# Patient Record
Sex: FEMALE | Race: WHITE | NOT HISPANIC OR LATINO | Employment: FULL TIME | URBAN - METROPOLITAN AREA
[De-identification: names, ages, dates, MRNs, and addresses within clinical notes are randomized per-mention and may not be internally consistent; named-entity substitution may affect disease eponyms.]

---

## 2017-08-17 ENCOUNTER — OFFICE VISIT (OUTPATIENT)
Dept: PEDIATRICS | Facility: CLINIC | Age: 52
End: 2017-08-17
Payer: COMMERCIAL

## 2017-08-17 ENCOUNTER — RADIANT APPOINTMENT (OUTPATIENT)
Dept: MAMMOGRAPHY | Facility: CLINIC | Age: 52
End: 2017-08-17
Payer: COMMERCIAL

## 2017-08-17 VITALS
TEMPERATURE: 97.5 F | BODY MASS INDEX: 38.73 KG/M2 | HEIGHT: 63 IN | DIASTOLIC BLOOD PRESSURE: 66 MMHG | OXYGEN SATURATION: 97 % | HEART RATE: 88 BPM | WEIGHT: 218.6 LBS | SYSTOLIC BLOOD PRESSURE: 108 MMHG

## 2017-08-17 DIAGNOSIS — R63.5 ABNORMAL WEIGHT GAIN: ICD-10-CM

## 2017-08-17 DIAGNOSIS — Z00.00 ROUTINE HEALTH MAINTENANCE: Primary | ICD-10-CM

## 2017-08-17 DIAGNOSIS — Z00.00 ROUTINE HEALTH MAINTENANCE: ICD-10-CM

## 2017-08-17 DIAGNOSIS — M79.10 MUSCLE ACHE: ICD-10-CM

## 2017-08-17 DIAGNOSIS — M76.60 ACHILLES TENDON PAIN: ICD-10-CM

## 2017-08-17 DIAGNOSIS — D23.5 BENIGN NEOPLASM OF SKIN OF TRUNK, EXCEPT SCROTUM: ICD-10-CM

## 2017-08-17 LAB
CHOLEST SERPL-MCNC: 167 MG/DL
HDLC SERPL-MCNC: 50 MG/DL
LDLC SERPL CALC-MCNC: 86 MG/DL
NONHDLC SERPL-MCNC: 117 MG/DL
TRIGL SERPL-MCNC: 156 MG/DL
TSH SERPL DL<=0.005 MIU/L-ACNC: 1.49 MU/L (ref 0.4–4)

## 2017-08-17 PROCEDURE — G0202 SCR MAMMO BI INCL CAD: HCPCS | Mod: TC

## 2017-08-17 PROCEDURE — 99212 OFFICE O/P EST SF 10 MIN: CPT | Mod: 25 | Performed by: NURSE PRACTITIONER

## 2017-08-17 PROCEDURE — 99396 PREV VISIT EST AGE 40-64: CPT | Performed by: NURSE PRACTITIONER

## 2017-08-17 PROCEDURE — 86803 HEPATITIS C AB TEST: CPT | Performed by: NURSE PRACTITIONER

## 2017-08-17 PROCEDURE — 84443 ASSAY THYROID STIM HORMONE: CPT | Performed by: NURSE PRACTITIONER

## 2017-08-17 PROCEDURE — 80061 LIPID PANEL: CPT | Performed by: NURSE PRACTITIONER

## 2017-08-17 PROCEDURE — 82306 VITAMIN D 25 HYDROXY: CPT | Performed by: NURSE PRACTITIONER

## 2017-08-17 PROCEDURE — 36415 COLL VENOUS BLD VENIPUNCTURE: CPT | Performed by: NURSE PRACTITIONER

## 2017-08-17 NOTE — PROGRESS NOTES
SUBJECTIVE:   CC: Palak Eckert is an 52 year old woman who presents for preventive health visit.     Physical   Annual:     Getting at least 3 servings of Calcium per day::  Yes    Bi-annual eye exam::  Yes    Dental care twice a year::  Yes    Sleep apnea or symptoms of sleep apnea::  None    Diet::  Regular (no restrictions)    Frequency of exercise::  2-3 days/week    Duration of exercise::  45-60 minutes    Taking medications regularly::  Not Applicable    Medication side effects::  None    Additional concerns today::  YES  Concerns today: toenails on both great toes  Left ankle achilles - bump. Has a bump on the back of her left achilles. Not painful. Was painful a few months ago for a few days but not painful. No weakness. Bump actually getting smaller.     Reports muscle aches over the last year. Has gained 10 lbs. Feels more sore when lying in bed or sometimes in the morning. No joint pain.     Today's PHQ-2 Score:   PHQ-2 ( 1999 Pfizer) 8/17/2017   Q1: Little interest or pleasure in doing things 0   Q2: Feeling down, depressed or hopeless 0   PHQ-2 Score 0   Q1: Little interest or pleasure in doing things Not at all   Q2: Feeling down, depressed or hopeless Not at all   PHQ-2 Score 0       Abuse: Current or Past(Physical, Sexual or Emotional)- No  Do you feel safe in your environment - Yes    Social History   Substance Use Topics     Smoking status: Never Smoker     Smokeless tobacco: Never Used     Alcohol use No      Comment: maybe 1 time per year     The patient does not drink >3 drinks per day nor >7 drinks per week.    Reviewed orders with patient.  Reviewed health maintenance and updated orders accordingly - Yes  Labs reviewed in [facility]    Patient over age 50, mutual decision to screen reflected in health maintenance.      Pertinent mammograms are reviewed under the imaging tab.  History of abnormal Pap smear: NO - age 30-65 PAP every 5 years with negative HPV co-testing recommended    Reviewed  "and updated as needed this visit by clinical staffTobacco  Allergies  Meds  Med Hx  Surg Hx  Fam Hx  Soc Hx        Reviewed and updated as needed this visit by Provider              ROS:  C: NEGATIVE for fever, chills, change in weight  I: NEGATIVE for worrisome rashes, moles or lesions  E: NEGATIVE for vision changes or irritation  ENT: NEGATIVE for ear, mouth and throat problems  R: NEGATIVE for significant cough or SOB  B: NEGATIVE for masses, tenderness or discharge  CV: NEGATIVE for chest pain, palpitations or peripheral edema  GI: NEGATIVE for nausea, abdominal pain, heartburn, or change in bowel habits  : NEGATIVE for unusual urinary or vaginal symptoms. No vaginal bleeding.  M: NEGATIVE for significant arthralgias or myalgia  N: NEGATIVE for weakness, dizziness or paresthesias  P: NEGATIVE for changes in mood or affect      OBJECTIVE:   /66 (Cuff Size: Adult Large)  Pulse 88  Temp 97.5  F (36.4  C) (Tympanic)  Ht 5' 2.75\" (1.594 m)  Wt 218 lb 9.6 oz (99.2 kg)  SpO2 97%  BMI 39.03 kg/m2  EXAM:  GENERAL: healthy, alert and no distress  EYES: Eyes grossly normal to inspection, PERRL and conjunctivae and sclerae normal  HENT: ear canals and TM's normal, nose and mouth without ulcers or lesions  NECK: no adenopathy, no asymmetry, masses, or scars and thyroid normal to palpation  RESP: lungs clear to auscultation - no rales, rhonchi or wheezes  BREAST: normal without masses, tenderness or nipple discharge and no palpable axillary masses or adenopathy  CV: regular rate and rhythm, normal S1 S2, no S3 or S4, no murmur, click or rub, no peripheral edema and peripheral pulses strong  ABDOMEN: soft, nontender, no hepatosplenomegaly, no masses and bowel sounds normal  MS: no gross musculoskeletal defects noted, no edema  SKIN: no suspicious lesions or rashes  NEURO: Normal strength and tone, mentation intact and speech normal  PSYCH: mentation appears normal, affect normal/bright    ASSESSMENT/PLAN: " "  1. Routine health maintenance  - *MA Screening Digital Bilateral; Future  - Hepatitis C Screen Reflex to HCV RNA Quant and Genotype  - TSH with free T4 reflex  - Lipid panel reflex to direct LDL    2. Achilles tendon pain  Likely achilles tendinopathy with visible swelling over the achilles. However, it is not painful and patient states the mass has gotten smaller.   -Wear supportive shoes  -If mass gets larger or if starts becoming painful call for podiatry referral  -Warm up and do gentle calf stretching in the morning and before exercise.     3. Benign neoplasm of skin of trunk, except scrotum  Has fair skin, needs skin check.   - DERMATOLOGY REFERRAL    4. Muscle ache  Has mild muscle aches. Likely due to weight gain and aging. No severe aches to warrant checking CK. No joint pain.    - Vitamin D Deficiency      (R63.5) Abnormal weight gain  Comment: States she eats minimally. 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   Plan:   -I recommended that she count her calories for about a month. If trying to lose weight needs to try to reduce the amount of calories she is eating in a day by about 500 and increase her physical activity.  -TSH, glucose, lipids  -If not having success losing 10lbs over the next 3-4 months she can call and I will put in a referral to endo for weight loss.        COUNSELING:  Reviewed preventive health counseling, as reflected in patient instructions       reports that she has never smoked. She has never used smokeless tobacco.    Estimated body mass index is 39.03 kg/(m^2) as calculated from the following:    Height as of this encounter: 5' 2.75\" (1.594 m).    Weight as of this encounter: 218 lb 9.6 oz (99.2 kg).   Weight management plan: Discussed healthy diet and exercise guidelines and patient will follow up in 12 months in clinic to re-evaluate.    Counseling Resources:  ATP IV Guidelines  Pooled Cohorts Equation Calculator  Breast Cancer Risk " Calculator  FRAX Risk Assessment  ICSI Preventive Guidelines  Dietary Guidelines for Americans, 2010  USDA's MyPlate  ASA Prophylaxis  Lung CA Screening    Ml Paz, APRN CNP  [facility]

## 2017-08-17 NOTE — PATIENT INSTRUCTIONS
Tea tree oil twice a day   FHN: [facility] Dermat[facility] - [place] [phone]   [address], [place] [address]      Preventive Health Recommendations  Female Ages 50 - 64    Yearly exam: See your health care provider every year in order to  o Review health changes.   o Discuss preventive care.    o Review your medicines if your doctor has prescribed any.      Get a Pap test every three years (unless you have an abnormal result and your provider advises testing more often).    If you get Pap tests with HPV test, you only need to test every 5 years, unless you have an abnormal result.     You do not need a Pap test if your uterus was removed (hysterectomy) and you have not had cancer.    You should be tested each year for STDs (sexually transmitted diseases) if you're at risk.     Have a mammogram every 1 to 2 years.    Have a colonoscopy at age 50, or have a yearly FIT test (stool test). These exams screen for colon cancer.      Have a cholesterol test every 5 years, or more often if advised.    Have a diabetes test (fasting glucose) every three years. If you are at risk for diabetes, you should have this test more often.     If you are at risk for osteoporosis (brittle bone disease), think about having a bone density scan (DEXA).    Shots: Get a flu shot each year. Get a tetanus shot every 10 years.    Nutrition:     Eat at least 5 servings of fruits and vegetables each day.    Eat whole-grain bread, whole-wheat pasta and brown rice instead of white grains and rice.    Talk to your provider about Calcium and Vitamin D.     Lifestyle    Exercise at least 150 minutes a week (30 minutes a day, 5 days a week). This will help you control your weight and prevent disease.    Limit alcohol to one drink per day.    No smoking.     Wear sunscreen to prevent skin cancer.     See your dentist every six months for an exam and cleaning.    See your eye doctor every 1 to 2 years.

## 2017-08-17 NOTE — MR AVS SNAPSHOT
After Visit Summary   8/17/2017    Palak Eckert    MRN: [id number]           Patient Information     Date Of Birth          1965        Visit Information        Provider Department      8/17/2017 8:40 AM Ml Paz APRN [facility]        Today's Diagnoses     Routine health maintenance    -  1    Achilles tendon pain        Benign neoplasm of skin of trunk, except scrotum          Care Instructions    Tea tree oil twice a day   FHN: My Dermatologist - [place] [phone] [address], [place] [address]      Preventive Health Recommendations  Female Ages 50 - 64    Yearly exam: See your health care provider every year in order to  o Review health changes.   o Discuss preventive care.    o Review your medicines if your doctor has prescribed any.      Get a Pap test every three years (unless you have an abnormal result and your provider advises testing more often).    If you get Pap tests with HPV test, you only need to test every 5 years, unless you have an abnormal result.     You do not need a Pap test if your uterus was removed (hysterectomy) and you have not had cancer.    You should be tested each year for STDs (sexually transmitted diseases) if you're at risk.     Have a mammogram every 1 to 2 years.    Have a colonoscopy at age 50, or have a yearly FIT test (stool test). These exams screen for colon cancer.      Have a cholesterol test every 5 years, or more often if advised.    Have a diabetes test (fasting glucose) every three years. If you are at risk for diabetes, you should have this test more often.     If you are at risk for osteoporosis (brittle bone disease), think about having a bone density scan (DEXA).    Shots: Get a flu shot each year. Get a tetanus shot every 10 years.    Nutrition:     Eat at least 5 servings of fruits and vegetables each day.    Eat whole-grain bread, whole-wheat pasta and brown rice  instead of white grains and rice.    Talk to your provider about Calcium and Vitamin D.     Lifestyle    Exercise at least 150 minutes a week (30 minutes a day, 5 days a week). This will help you control your weight and prevent disease.    Limit alcohol to one drink per day.    No smoking.     Wear sunscreen to prevent skin cancer.     See your dentist every six months for an exam and cleaning.    See your eye doctor every 1 to 2 years.            Follow-ups after your visit        Additional Services     DERMATOLOGY REFERRAL       Your provider has referred you to: LINDA: [facility] Dermatologist - [place] [phone]   [email or web address]    Please be aware that coverage of these services is subject to the terms and limitations of your health insurance plan.  Call member services at your health plan with any benefit or coverage questions.      Please bring the following with you to your appointment:    (1) Any X-Rays, CTs or MRIs which have been performed.  Contact the facility where they were done to arrange for  prior to your scheduled appointment.    (2) List of current medications  (3) This referral request   (4) Any documents/labs given to you for this referral                  Your next 10 appointments already scheduled     Aug 17, 2017  9:45 AM CDT   MA SCREENING DIGITAL BILATERAL with EAMA1   [facility] ([facility])    [address] ,[address]  [facility] [address] [phone]           Do not use any powder, lotion or deodorant under your arms or on your breast. If you do, we will ask you to remove it before your exam.  Wear comfortable, two-piece clothing.  If you have any allergies, tell your care team.  Bring any previous mammograms from other facilities or have them mailed to the breast center. Three-dimensional (3D) mammograms are available at [place] locations in [facility], [facility], and [place]. [facility]  "locations include [facility] in [place]. Benefits of 3D mammograms include: - Improved rate of cancer detection - Decreases your chance of having to go back for more tests, which means fewer: - \"False-positive\" results (This means that there is an abnormal area but it isn't cancer.) - Invasive testing procedures, such as a biopsy or surgery - Can provide clearer images of the breast if you have dense breast tissue. 3D mammography is an optional exam that anyone can have with a 2D mammogram. It doesn't replace or take the place of a 2D mammogram. 2D mammograms remain an effective screening test for all women.  Not all insurance companies cover the cost of a 3D mammogram. Check with your insurance.              Future tests that were ordered for you today     Open Future Orders        Priority Expected Expires Ordered    *MA Screening Digital Bilateral Routine  8/17/2018 8/17/2017            Who to contact     If you have questions or need follow up information about today's clinic visit or your schedule please contact [facility] directly at [phone].  Normal or non-critical lab and imaging results will be communicated to you by MyChart, letter or phone within 4 business days after the clinic has received the results. If you do not hear from us within 7 days, please contact the clinic through [employer] or phone. If you have a critical or abnormal lab result, we will notify you by phone as soon as possible.  Submit refill requests through [employer] or call your pharmacy and they will forward the refill request to us. Please allow 3 business days for your refill to be completed.          Additional Information About Your Visit        [employer] Information     [employer] lets you send messages to your doctor, view your test results, renew your prescriptions, schedule appointments and more. To sign up, go to [email or web address] . Click on \"Log in\" on the left side of the " "screen, which will take you to the Welcome page. Then click on \"Sign up Now\" on the right side of the page.     You will be asked to enter the access code listed below, as well as some personal information. Please follow the directions to create your username and password.     Your access code is: [email or web address]  Expires: 11/15/2017  9:11 AM     Your access code will  in 90 days. If you need help or a new code, please call your [facility] or [phone].        Care EveryWhere ID     This is your Care EveryWhere ID. This could be used by other organizations to access your [place] medical records  [id number]        Your Vitals Were     Pulse Temperature Height Pulse Oximetry BMI (Body Mass Index)       88 97.5  F (36.4  C) (Tympanic) 5' 2.75\" (1.594 m) 97% 39.03 kg/m2        Blood Pressure from Last 3 Encounters:   17 108/66   16 114/68   12/18/15 116/79    Weight from Last 3 Encounters:   17 218 lb 9.6 oz (99.2 kg)   16 208 lb (94.3 kg)   12/18/15 205 lb 3.2 oz (93.1 kg)              We Performed the Following     DERMATOLOGY REFERRAL     Hepatitis C Screen Reflex to HCV RNA Quant and Genotype     Lipid panel reflex to direct LDL     TSH with free T4 reflex        Primary Care Provider Office Phone # Fax #    Ml Paz, JULIA [facility] [phone] [address] [place] [address]        Equal Access to Services     [facility] SAVANNAH : Hadii seferino andrade Soesteban, waaxda luqadaha, qaybta kaalmada mikala, brayan dodd. So [facility] [phone].    ATENCIÓN: Si habla español, tiene a carlisle disposición servicios gratuitos de asistencia lingüística. Llame al [phone].    We comply with applicable federal civil rights laws and [place] laws. We do not discriminate on the basis of race, color, national origin, age, disability sex, sexual orientation or gender identity.            Thank you!     Thank you for choosing " [facility]  for your care. Our goal is always to provide you with excellent care. Hearing back from our patients is one way we can continue to improve our services. Please take a few minutes to complete the written survey that you may receive in the mail after your visit with us. Thank you!             Your Updated Medication List - Protect others around you: Learn how to safely use, store and throw away your medicines at www.disposemymeds.org.          This list is accurate as of: 8/17/17  9:17 AM.  Always use your most recent med list.                   Brand Name Dispense Instructions for use Diagnosis    fluticasone 50 MCG/ACT spray    FLONASE    1 Bottle    Spray 1-2 sprays into both nostrils daily    Seasonal allergic rhinitis       Multi-vitamin Tabs tablet      Take 1 tablet by mouth daily

## 2017-08-17 NOTE — NURSING NOTE
"Chief Complaint   Patient presents with     Physical       Initial /66 (Cuff Size: Adult Large)  Pulse 88  Temp 97.5  F (36.4  C) (Tympanic)  Ht 5' 2.75\" (1.594 m)  Wt 218 lb 9.6 oz (99.2 kg)  SpO2 97%  BMI 39.03 kg/m2 Estimated body mass index is 39.03 kg/(m^2) as calculated from the following:    Height as of this encounter: 5' 2.75\" (1.594 m).    Weight as of this encounter: 218 lb 9.6 oz (99.2 kg).  Medication Reconciliation: complete   Naz Ruiz CMA    "

## 2017-08-18 LAB
DEPRECATED CALCIDIOL+CALCIFEROL SERPL-MC: 31 UG/L (ref 20–75)
HCV AB SERPL QL IA: NONREACTIVE

## 2017-11-24 ENCOUNTER — ALLIED HEALTH/NURSE VISIT (OUTPATIENT)
Dept: NURSING | Facility: CLINIC | Age: 52
End: 2017-11-24
Payer: COMMERCIAL

## 2017-11-24 DIAGNOSIS — Z23 NEED FOR PROPHYLACTIC VACCINATION AND INOCULATION AGAINST INFLUENZA: Primary | ICD-10-CM

## 2017-11-24 PROCEDURE — 90686 IIV4 VACC NO PRSV 0.5 ML IM: CPT

## 2017-11-24 PROCEDURE — 90471 IMMUNIZATION ADMIN: CPT

## 2017-11-24 PROCEDURE — 99207 ZZC NO CHARGE NURSE ONLY: CPT

## 2017-11-24 NOTE — MR AVS SNAPSHOT
"              After Visit Summary   2017    Palak Eckert    MRN: [id number]           Patient Information     Date Of Birth          1965        Visit Information        Provider Department      2017 2:15 PM ANGELA ROMAN MA/DHRUV [facility]        Today's Diagnoses     Need for prophylactic vaccination and inoculation against influenza    -  1       Follow-ups after your visit        Who to contact     If you have questions or need follow up information about today's clinic visit or your schedule please contact [facility] directly at [phone].  Normal or non-critical lab and imaging results will be communicated to you by "[employer]hart, letter or phone within 4 business days after the clinic has received the results. If you do not hear from us within 7 days, please contact the clinic through [employer] or phone. If you have a critical or abnormal lab result, we will notify you by phone as soon as possible.  Submit refill requests through [employer] or call your pharmacy and they will forward the refill request to us. Please allow 3 business days for your refill to be completed.          Additional Information About Your Visit        MyChart Information     [employer]d lets you send messages to your doctor, view your test results, renew your prescriptions, schedule appointments and more. To sign up, go to [email or web address] . Click on \"Log in\" on the left side of the screen, which will take you to the Welcome page. Then click on \"Sign up Now\" on the right side of the page.     You will be asked to enter the access code listed below, as well as some personal information. Please follow the directions to create your username and password.     Your access code is: [email or web address]  Expires: 2018  3:03 PM     Your access code will  in 90 days. If you need help or a new code, please call your [facility] or [phone].        Care EveryWhere ID     This is your " Care EveryWhere ID. This could be used by other organizations to access your [place] medical records  [id number]         Blood Pressure from Last 3 Encounters:   08/17/17 108/66   07/22/16 114/68   12/18/15 116/79    Weight from Last 3 Encounters:   08/17/17 218 lb 9.6 oz (99.2 kg)   07/22/16 208 lb (94.3 kg)   12/18/15 205 lb 3.2 oz (93.1 kg)              We Performed the Following     FLU VAC, SPLIT VIRUS IM > 3 YO (QUADRIVALENT) [15788]     Vaccine Administration, Initial [84165]        Primary Care Provider Office Phone # Fax #    Ml JULIA Galicia [phone] [address] [place] [address]        Equal Access to Services     [facility]: Hadii seferino lacy hadasho Soomaali, waaxda luqadaha, qaybta kaalmada adeegyada, brayan vieyra . So [facility] [phone].    ATENCIÓN: Si habla español, tiene a carlisle disposición servicios gratuitos de asistencia lingüística. Ji al [phone].    We comply with applicable federal civil rights laws and [place] laws. We do not discriminate on the basis of race, color, national origin, age, disability, sex, sexual orientation, or gender identity.            Thank you!     Thank you for choosing [facility]  for your care. Our goal is always to provide you with excellent care. Hearing back from our patients is one way we can continue to improve our services. Please take a few minutes to complete the written survey that you may receive in the mail after your visit with us. Thank you!             Your Updated Medication List - Protect others around you: Learn how to safely use, store and throw away your medicines at www.disposemymeds.org.          This list is accurate as of: 11/24/17  3:03 PM.  Always use your most recent med list.                   Brand Name Dispense Instructions for use Diagnosis    fluticasone 50 MCG/ACT spray    FLONASE    1 Bottle    Spray 1-2 sprays into both nostrils  daily    Seasonal allergic rhinitis       Multi-vitamin Tabs tablet      Take 1 tablet by mouth daily

## 2017-11-24 NOTE — PROGRESS NOTES

## 2018-09-22 ENCOUNTER — HEALTH MAINTENANCE LETTER (OUTPATIENT)
Age: 53
End: 2018-09-22

## 2018-10-20 ENCOUNTER — HEALTH MAINTENANCE LETTER (OUTPATIENT)
Age: 53
End: 2018-10-20

## 2018-11-23 ENCOUNTER — ALLIED HEALTH/NURSE VISIT (OUTPATIENT)
Dept: NURSING | Facility: CLINIC | Age: 53
End: 2018-11-23
Payer: COMMERCIAL

## 2018-11-23 DIAGNOSIS — Z23 NEED FOR PROPHYLACTIC VACCINATION AND INOCULATION AGAINST INFLUENZA: Primary | ICD-10-CM

## 2018-11-23 PROCEDURE — 90471 IMMUNIZATION ADMIN: CPT

## 2018-11-23 PROCEDURE — 99207 ZZC NO CHARGE NURSE ONLY: CPT

## 2018-11-23 PROCEDURE — 90682 RIV4 VACC RECOMBINANT DNA IM: CPT

## 2018-11-23 NOTE — PROGRESS NOTES

## 2018-11-23 NOTE — MR AVS SNAPSHOT
After Visit Summary   11/23/2018    Palak Eckert    MRN: [id number]           Patient Information     Date Of Birth          1965        Visit Information        Provider Department      11/23/2018 11:30 AM ANGELA WU/LPN [facility]        Today's Diagnoses     Need for prophylactic vaccination and inoculation against influenza    -  1       Follow-ups after your visit        Who to contact     If you have questions or need follow up information about today's clinic visit or your schedule please contact [facility] directly at [phone].  Normal or non-critical lab and imaging results will be communicated to you by MyChart, letter or phone within 4 business days after the clinic has received the results. If you do not hear from us within 7 days, please contact the clinic through MyChart or phone. If you have a critical or abnormal lab result, we will notify you by phone as soon as possible.  Submit refill requests through BusyFlow or call your pharmacy and they will forward the refill request to us. Please allow 3 business days for your refill to be completed.          Additional Information About Your Visit        Care EveryWhere ID     This is your Care EveryWhere ID. This could be used by other organizations to access your [facility] medical records  [id number]         Blood Pressure from Last 3 Encounters:   08/17/17 108/66   07/22/16 114/68   12/18/15 116/79    Weight from Last 3 Encounters:   08/17/17 218 lb 9.6 oz (99.2 kg)   07/22/16 208 lb (94.3 kg)   12/18/15 205 lb 3.2 oz (93.1 kg)              We Performed the Following     FLU VACCINE, (RIV4) RECOMBINANT HA  , IM (FluBlok, egg free) [13081]- >18 YRS (FMG recommended  50-64 YRS)     Vaccine Administration, Initial [[id number]]        Primary Care Provider Office Phone # Fax #    JULIA Coulter [facility] [phone] [address] [place] [address]        Equal  Access to Services     [facility]: Hadii aad ku hadelíasliz Gant, wajaninada luqadaha, qaybta kasvetabrayan atkinson. So [facility] [phone].    ATENCIÓN: Si habla español, tiene a carlisle disposición servicios gratuitos de asistencia lingüística. Llame al [phone].    We comply with applicable federal civil rights laws and [place] laws. We do not discriminate on the basis of race, color, national origin, age, disability, sex, sexual orientation, or gender identity.            Thank you!     Thank you for choosing [facility]  for your care. Our goal is always to provide you with excellent care. Hearing back from our patients is one way we can continue to improve our services. Please take a few minutes to complete the written survey that you may receive in the mail after your visit with us. Thank you!             Your Updated Medication List - Protect others around you: Learn how to safely use, store and throw away your medicines at www.disposemymeds.org.          This list is accurate as of 11/23/18 11:59 PM.  Always use your most recent med list.                   Brand Name Dispense Instructions for use Diagnosis    fluticasone 50 MCG/ACT spray    FLONASE    1 Bottle    Spray 1-2 sprays into both nostrils daily    Seasonal allergic rhinitis       Multi-vitamin Tabs tablet      Take 1 tablet by mouth daily

## 2019-07-22 ENCOUNTER — OFFICE VISIT (OUTPATIENT)
Dept: PEDIATRICS | Facility: CLINIC | Age: 54
End: 2019-07-22
Payer: COMMERCIAL

## 2019-07-22 ENCOUNTER — ANCILLARY PROCEDURE (OUTPATIENT)
Dept: MAMMOGRAPHY | Facility: CLINIC | Age: 54
End: 2019-07-22
Payer: COMMERCIAL

## 2019-07-22 VITALS
HEIGHT: 63 IN | TEMPERATURE: 98.4 F | HEART RATE: 79 BPM | SYSTOLIC BLOOD PRESSURE: 134 MMHG | BODY MASS INDEX: 38.62 KG/M2 | DIASTOLIC BLOOD PRESSURE: 84 MMHG | WEIGHT: 218 LBS | OXYGEN SATURATION: 98 %

## 2019-07-22 DIAGNOSIS — Z13.220 LIPID SCREENING: ICD-10-CM

## 2019-07-22 DIAGNOSIS — R14.0 BLOATING: ICD-10-CM

## 2019-07-22 DIAGNOSIS — L91.8 SKIN TAG: ICD-10-CM

## 2019-07-22 DIAGNOSIS — Z12.31 VISIT FOR SCREENING MAMMOGRAM: ICD-10-CM

## 2019-07-22 DIAGNOSIS — Z00.00 ROUTINE GENERAL MEDICAL EXAMINATION AT A HEALTH CARE FACILITY: Primary | ICD-10-CM

## 2019-07-22 DIAGNOSIS — Z23 NEED FOR VACCINATION: ICD-10-CM

## 2019-07-22 PROCEDURE — 80061 LIPID PANEL: CPT | Performed by: NURSE PRACTITIONER

## 2019-07-22 PROCEDURE — 36415 COLL VENOUS BLD VENIPUNCTURE: CPT | Performed by: NURSE PRACTITIONER

## 2019-07-22 PROCEDURE — 90471 IMMUNIZATION ADMIN: CPT | Performed by: NURSE PRACTITIONER

## 2019-07-22 PROCEDURE — 99396 PREV VISIT EST AGE 40-64: CPT | Mod: 25 | Performed by: NURSE PRACTITIONER

## 2019-07-22 PROCEDURE — 82947 ASSAY GLUCOSE BLOOD QUANT: CPT | Performed by: NURSE PRACTITIONER

## 2019-07-22 PROCEDURE — 90715 TDAP VACCINE 7 YRS/> IM: CPT | Performed by: NURSE PRACTITIONER

## 2019-07-22 PROCEDURE — 99213 OFFICE O/P EST LOW 20 MIN: CPT | Mod: 25 | Performed by: NURSE PRACTITIONER

## 2019-07-22 PROCEDURE — 77067 SCR MAMMO BI INCL CAD: CPT | Mod: TC

## 2019-07-22 ASSESSMENT — ENCOUNTER SYMPTOMS
ABDOMINAL PAIN: 0
CHILLS: 0
HEMATOCHEZIA: 0
FEVER: 0
HEMATURIA: 0
CONSTIPATION: 0
EYE PAIN: 0
DIZZINESS: 0
DIARRHEA: 0
COUGH: 0
NERVOUS/ANXIOUS: 0

## 2019-07-22 ASSESSMENT — MIFFLIN-ST. JEOR: SCORE: 1557.97

## 2019-07-22 NOTE — PATIENT INSTRUCTIONS
1. Try culturelle probiotic 1 per day  2. Consider 2 week trial of gluten free then dairy free        Preventive Health Recommendations  Female Ages 50 - 64    Yearly exam: See your health care provider every year in order to  o Review health changes.   o Discuss preventive care.    o Review your medicines if your doctor has prescribed any.      Get a Pap test every three years (unless you have an abnormal result and your provider advises testing more often).    If you get Pap tests with HPV test, you only need to test every 5 years, unless you have an abnormal result.     You do not need a Pap test if your uterus was removed (hysterectomy) and you have not had cancer.    You should be tested each year for STDs (sexually transmitted diseases) if you're at risk.     Have a mammogram every 1 to 2 years.    Have a colonoscopy at age 50, or have a yearly FIT test (stool test). These exams screen for colon cancer.      Have a cholesterol test every 5 years, or more often if advised.    Have a diabetes test (fasting glucose) every three years. If you are at risk for diabetes, you should have this test more often.     If you are at risk for osteoporosis (brittle bone disease), think about having a bone density scan (DEXA).    Shots: Get a flu shot each year. Get a tetanus shot every 10 years.    Nutrition:     Eat at least 5 servings of fruits and vegetables each day.    Eat whole-grain bread, whole-wheat pasta and brown rice instead of white grains and rice.    Get adequate Calcium and Vitamin D.     Lifestyle    Exercise at least 150 minutes a week (30 minutes a day, 5 days a week). This will help you control your weight and prevent disease.    Limit alcohol to one drink per day.    No smoking.     Wear sunscreen to prevent skin cancer.     See your dentist every six months for an exam and cleaning.    See your eye doctor every 1 to 2 years.

## 2019-07-22 NOTE — PROGRESS NOTES
SUBJECTIVE:   CC: Palak Eckert is an 54 year old woman who presents for preventive health visit.     Healthy Habits:     Getting at least 3 servings of Calcium per day:  Yes    Bi-annual eye exam:  NO    Dental care twice a year:  Yes    Sleep apnea or symptoms of sleep apnea:  None    Diet:  Regular (no restrictions)    Frequency of exercise:  1 day/week    Duration of exercise:  30-45 minutes    Taking medications regularly:  Not Applicable    Medication side effects:  Not applicable    PHQ-2 Total Score: 0    Additional concerns today:  Yes    Concerns today: skin tags.     Reports bloating after eating. No abdominal pain. Stools are regular, formed.         -------------------------------------    Today's PHQ-2 Score:   PHQ-2 ( 1999 Pfizer) 7/22/2019   Q1: Little interest or pleasure in doing things 0   Q2: Feeling down, depressed or hopeless 0   PHQ-2 Score 0   Q1: Little interest or pleasure in doing things Not at all   Q2: Feeling down, depressed or hopeless Not at all   PHQ-2 Score 0     Abuse: Current or Past(Physical, Sexual or Emotional)- No  Do you feel safe in your environment? Yes    Social History     Tobacco Use     Smoking status: Never Smoker     Smokeless tobacco: Never Used   Substance Use Topics     Alcohol use: No     Comment: maybe 1 time per year     If you drink alcohol do you typically have >3 drinks per day or >7 drinks per week? No    Alcohol Use 8/17/2017   Prescreen: >3 drinks/day or >7 drinks/week? The patient does not drink >3 drinks per day nor >7 drinks per week.       Reviewed orders with patient.  Reviewed health maintenance and updated orders accordingly - Yes  Lab work is in process    Mammogram Screening: Patient over age 50, mutual decision to screen reflected in health maintenance.    Pertinent mammograms are reviewed under the imaging tab.  History of abnormal Pap smear: NO - age 30-65 PAP every 5 years with negative HPV co-testing recommended  PAP / HPV Latest Ref  "Rng & Units 7/21/2015 8/9/2010 11/28/2008   PAP - NIL OTHER-NIL EM>40 NIL   HPV 16 DNA NEG Negative - -   HPV 18 DNA NEG Negative - -   OTHER HR HPV NEG Negative - -     Reviewed and updated as needed this visit by clinical staff         Reviewed and updated as needed this visit by Provider            Review of Systems  CONSTITUTIONAL: NEGATIVE for fever, chills, change in weight  INTEGUMENTARY/SKIN: NEGATIVE for worrisome rashes, moles or lesions  EYES: NEGATIVE for vision changes or irritation  ENT: NEGATIVE for ear, mouth and throat problems  RESP: NEGATIVE for significant cough or SOB  BREAST: NEGATIVE for masses, tenderness or discharge  CV: NEGATIVE for chest pain, palpitations or peripheral edema  GI:Bloating. Otherwise NEGATIVE for nausea, abdominal pain, heartburn, or change in bowel habits  : NEGATIVE for unusual urinary or vaginal symptoms. No vaginal bleeding.  MUSCULOSKELETAL: NEGATIVE for significant arthralgias or myalgia  NEURO: NEGATIVE for weakness, dizziness or paresthesias  PSYCHIATRIC: NEGATIVE for changes in mood or affect      OBJECTIVE:   /84 (BP Location: Right arm, Cuff Size: Adult Large)   Pulse 79   Temp 98.4  F (36.9  C) (Oral)   Ht 1.6 m (5' 3\")   Wt 98.9 kg (218 lb)   SpO2 98%   BMI 38.62 kg/m    Physical Exam  GENERAL APPEARANCE: healthy, alert and no distress  EYES: Eyes grossly normal to inspection, PERRL and conjunctivae and sclerae normal  HENT: ear canals and TM's normal, nose and mouth without ulcers or lesions, oropharynx clear and oral mucous membranes moist  NECK: no adenopathy, no asymmetry, masses, or scars and thyroid normal to palpation  RESP: lungs clear to auscultation - no rales, rhonchi or wheezes  BREAST: normal without masses, tenderness or nipple discharge and no palpable axillary masses or adenopathy  CV: regular rate and rhythm, normal S1 S2, no S3 or S4, no murmur, click or rub, no peripheral edema and peripheral pulses strong  ABDOMEN: soft, " "nontender, no hepatosplenomegaly, no masses and bowel sounds normal  MS: no musculoskeletal defects are noted and gait is age appropriate without ataxia  SKIN: no suspicious lesions or rashes  NEURO: Normal strength and tone, sensory exam grossly normal, mentation intact and speech normal  PSYCH: mentation appears normal and affect normal/bright    Diagnostic Test Results:  Labs reviewed in Epic    ASSESSMENT/PLAN:   1. Routine general medical examination at a health care facility  - GLUCOSE  - TDAP VACCINE (ADACEL)  - MA SCREENING DIGITAL BILAT - Future  (s+30); Future  -      ADMIN VACCINE, FIRST  - REVIEW OF HEALTH MAINTENANCE PROTOCOL ORDERS    2. Lipid screening  - Lipid panel reflex to direct LDL Fasting    3. Need for vaccination  - TDAP VACCINE (ADACEL)  -      ADMIN VACCINE, FIRST    4. Skin tag  Also needs skin check due to myriad moles and freckles.   - DERMATOLOGY REFERRAL    5. Bloating  Temporally related to eating. No other associated sx.  -2 week trial of culturelle. If no improvement 2 week trial of gluten free then dairy free. Call if not improving.       COUNSELING:  Reviewed preventive health counseling, as reflected in patient instructions  Special attention given to:     Estimated body mass index is 39.03 kg/m  as calculated from the following:    Height as of 8/17/17: 1.594 m (5' 2.75\").    Weight as of 8/17/17: 99.2 kg (218 lb 9.6 oz).    Weight management plan: Discussed healthy diet and exercise guidelines     reports that she has never smoked. She has never used smokeless tobacco.      Counseling Resources:  ATP IV Guidelines  Pooled Cohorts Equation Calculator  Breast Cancer Risk Calculator  FRAX Risk Assessment  ICSI Preventive Guidelines  Dietary Guidelines for Americans, 2010  USDA's MyPlate  ASA Prophylaxis  Lung CA Screening    Ml Paz, JULIA [facility] CARLOS  "

## 2019-07-22 NOTE — LETTER
"               [facility]  [address] [address]                  [phone]   July 23, 2019    Palak Eckert  [address]  [place] [address]      Dear Van Cid there,     Your blood sugar is normal.     Your cholesterol is up a bit. Your triglycerides and LDL cholesterol are high. The best thing you could do to reduce this would be to reduce the amount of processed carbohydrates you are consuming (sweetened beverages, low fat dairy, chips, crackers, bread, pasta, potatoes, tortillas, etc). I recommend focusing on meat and veggies. Your HDL cholesterol, which is \"good\" cholesterol, is slightly low. You can increase this with aerobic exercise. I don't recommend any medication at this time.     All the best,     Your test results are enclosed.      Please contact me if you have any questions.           Thank you very much for choosing [facility]    Best regards,    Ml Paz CNP        Results for orders placed or performed in visit on 07/22/19   GLUCOSE   Result Value Ref Range    Glucose 85 70 - 99 mg/dL   Lipid panel reflex to direct LDL Fasting   Result Value Ref Range    Cholesterol 186 <200 mg/dL    Triglycerides 179 (H) <150 mg/dL    HDL Cholesterol 45 (L) >49 mg/dL    LDL Cholesterol Calculated 105 (H) <100 mg/dL    Non HDL Cholesterol 141 (H) <130 mg/dL     "

## 2019-07-23 LAB
CHOLEST SERPL-MCNC: 186 MG/DL
GLUCOSE SERPL-MCNC: 85 MG/DL (ref 70–99)
HDLC SERPL-MCNC: 45 MG/DL
LDLC SERPL CALC-MCNC: 105 MG/DL
NONHDLC SERPL-MCNC: 141 MG/DL
TRIGL SERPL-MCNC: 179 MG/DL

## 2019-09-11 ENCOUNTER — TRANSFERRED RECORDS (OUTPATIENT)
Dept: HEALTH INFORMATION MANAGEMENT | Facility: CLINIC | Age: 54
End: 2019-09-11

## 2020-07-27 ENCOUNTER — HOSPITAL ENCOUNTER (OUTPATIENT)
Dept: MAMMOGRAPHY | Facility: CLINIC | Age: 55
Discharge: HOME | End: 2020-07-27
Attending: NURSE PRACTITIONER | Admitting: NURSE PRACTITIONER
Payer: COMMERCIAL

## 2020-07-27 DIAGNOSIS — Z12.31 OTHER SCREENING MAMMOGRAM: ICD-10-CM

## 2020-07-27 PROCEDURE — 77067 SCR MAMMO BI INCL CAD: CPT

## 2020-10-26 ENCOUNTER — ALLIED HEALTH/NURSE VISIT (OUTPATIENT)
Dept: NURSING | Facility: CLINIC | Age: 55
End: 2020-10-26
Payer: COMMERCIAL

## 2020-10-26 DIAGNOSIS — Z23 NEED FOR PROPHYLACTIC VACCINATION AND INOCULATION AGAINST INFLUENZA: Primary | ICD-10-CM

## 2020-10-26 PROCEDURE — 90471 IMMUNIZATION ADMIN: CPT

## 2020-10-26 PROCEDURE — 90682 RIV4 VACC RECOMBINANT DNA IM: CPT

## 2021-06-24 ENCOUNTER — OFFICE VISIT (OUTPATIENT)
Dept: PEDIATRICS | Facility: CLINIC | Age: 56
End: 2021-06-24
Payer: COMMERCIAL

## 2021-06-24 VITALS
BODY MASS INDEX: 38.69 KG/M2 | HEART RATE: 89 BPM | SYSTOLIC BLOOD PRESSURE: 134 MMHG | TEMPERATURE: 98.8 F | DIASTOLIC BLOOD PRESSURE: 78 MMHG | OXYGEN SATURATION: 97 % | WEIGHT: 218.4 LBS

## 2021-06-24 DIAGNOSIS — M79.622 PAIN OF LEFT UPPER ARM: ICD-10-CM

## 2021-06-24 DIAGNOSIS — S46.002A INJURY OF LEFT ROTATOR CUFF, INITIAL ENCOUNTER: Primary | ICD-10-CM

## 2021-06-24 PROCEDURE — 99213 OFFICE O/P EST LOW 20 MIN: CPT | Mod: GE | Performed by: STUDENT IN AN ORGANIZED HEALTH CARE EDUCATION/TRAINING PROGRAM

## 2021-06-24 NOTE — PROGRESS NOTES
Assessment & Plan     Pain of left upper arm  Injury of left rotator cuff, initial encounter  History and exam seem most consistent with rotator cuff injury. No weakness or numbness on exam makes cervical radiation less likely. No joint swelling or injury.   - [facility]; Future  - follow up in 4-6 if not improving with physical therapy - could consider referral to orthopedics or sports medicine  - naproxen BID for 7-10 days  - heat or ice    Floresita Pineda MD  Internal Medicine & Pediatrics PGY4  [facility]      Subjective   Palak is a 56 year old who presents for the following health issues     HPI     Musculoskeletal problem/pain  Onset/Duration: 2 weeks plus   Description  Location: arm  - left upper arm in the middle   Joint Swelling: no  Redness: no  Pain: YES, then radiates up into shoulder and upper back, worse at rest, better with activity   Warmth: possibly   Intensity:  Mild to severe  Progression of Symptoms:  Same, sleeps on left side  Accompanying signs and symptoms: heaviness, nagging   Fevers: no  Numbness/tingling/weakness: no weakness or numbness  History  Trauma to the area: no  Recent illness:  no  Previous similar problem: no  Previous evaluation:  No  No family history of personal history of blood clots   Precipitating or alleviating factors:  Aggravating factors include: ROM and positioning, worse after sleeping on her left shoulder  Therapies tried and outcome: rest, Ibuprofen some improvement      Review of Systems   Constitutional, HEENT, cardiovascular, pulmonary, gi and gu systems are negative, except as otherwise noted.      Objective    /78   Pulse 89   Temp 98.8  F (37.1  C)   Wt 99.1 kg (218 lb 6.4 oz)   SpO2 97%   BMI 38.69 kg/m    Body mass index is 38.69 kg/m .  Physical Exam   GENERAL: healthy, alert and no distress  NECK: no adenopathy, no asymmetry, masses, or scars and thyroid normal to palpation  RESP: lungs clear to auscultation - no  rales, rhonchi or wheezes  CV: regular rate and rhythm, normal S1 S2, no S3 or S4, no murmur, click or rub, no peripheral edema and peripheral pulses strong  MS: no gross musculoskeletal defects noted, no edema, tender to palpation along proximal lateral humerus, full passive ROM of shoulder and elbows bilaterally, some pain with full abduction of left shoulder, no joint swelling  SKIN: no suspicious lesions or rashes  NEURO: Normal strength, reflexes and tone bilateral UE, mentation intact and speech normal  BACK: no CVA tenderness, no paralumbar tenderness        ===========  STAFF NOTE:  Patient discussed with resident physician today.  We discussed alvarenga portions of the visit and I participated in the evaluation and management of the patient today.     Jake Woods MD

## 2021-06-24 NOTE — PATIENT INSTRUCTIONS
Naproxen twice daily for 7-10 days   Physical therapy recommended  If not better in next 4-6 weeks, we can get you in with orthopedics

## 2021-09-28 ENCOUNTER — TELEPHONE (OUTPATIENT)
Dept: PEDIATRICS | Facility: CLINIC | Age: 56
End: 2021-09-28

## 2021-09-28 NOTE — LETTER
October 1, 2021      Palak Eckert  [address]  [place] [address]        Dear Palak,       We care about your health and have reviewed your health plan including your medical conditions, medications, and lab results.  Based on this review, it is recommended that you follow up regarding the following health topic(s):  -Breast Cancer Screening  -Cervical Cancer Screening  -Wellness (Physical) Visit     We recommend you take the following action(s):  -schedule a WELLNESS (Physical) APPOINTMENT.  We will perform the following labs: pap smear.  -schedule a MAMMOGRAM which is due. Please disregard this reminder if you have had this exam elsewhere within the last 1-2 years please let us know so we can update your records.     Please call us at the [facility] - [phone] (or use [employer]) to address the above recommendations.     Thank you for trusting [facility] and we appreciate the opportunity to serve you.  We look forward to supporting your healthcare needs in the future.    Healthy Regards,    Your Health Care Team  [facility]

## 2021-09-28 NOTE — TELEPHONE ENCOUNTER
Patient Quality Outreach      Summary:    Patient has the following on her problem list/HM: None    Patient is due/failing the following:   Breast Cancer Screening - Mammogram, Cervical Cancer Screening - PAP Needed, Adult/Adolescent physical, date due: 7/22/20 and Immunizations    Type of outreach:    Phone, left message for patient/parent to call back.    Questions for provider review:    None                                                  Naz Ruiz CMA    Chart routed to Care Team.

## 2021-10-01 NOTE — TELEPHONE ENCOUNTER
Patient Quality Outreach 2nd Attempt      Summary:    Type of outreach:    Sent letter.    Next Steps:  Reach out within 90 days via Phone.    Max number of attempts reached: No. Will try again in 90 days if patient still on fail list.    Questions for provider review:    None                                                                                                                    Naz Ruiz CMA    Chart routed to Care Team.

## 2021-10-11 NOTE — TELEPHONE ENCOUNTER
Patient Quality Outreach 3rd Attempt      Summary:    Type of outreach:    Phone, left message for patient/parent to call back.    Max number of attempts reached: Yes. Will try again in 90 days if patient still on fail list.    Questions for provider review:    None                                                                                                                    Naz Ruiz CMA    Chart closed.

## 2021-12-20 ENCOUNTER — ANCILLARY PROCEDURE (OUTPATIENT)
Dept: GENERAL RADIOLOGY | Facility: CLINIC | Age: 56
End: 2021-12-20
Attending: STUDENT IN AN ORGANIZED HEALTH CARE EDUCATION/TRAINING PROGRAM
Payer: COMMERCIAL

## 2021-12-20 ENCOUNTER — OFFICE VISIT (OUTPATIENT)
Dept: PEDIATRICS | Facility: CLINIC | Age: 56
End: 2021-12-20
Payer: COMMERCIAL

## 2021-12-20 VITALS
DIASTOLIC BLOOD PRESSURE: 70 MMHG | HEART RATE: 82 BPM | BODY MASS INDEX: 38.57 KG/M2 | WEIGHT: 217.7 LBS | RESPIRATION RATE: 14 BRPM | HEIGHT: 63 IN | SYSTOLIC BLOOD PRESSURE: 126 MMHG

## 2021-12-20 DIAGNOSIS — M79.10 MUSCLE SORENESS: Primary | ICD-10-CM

## 2021-12-20 DIAGNOSIS — M79.651 PAIN IN BOTH THIGHS: ICD-10-CM

## 2021-12-20 DIAGNOSIS — G89.29 CHRONIC PAIN OF BOTH KNEES: ICD-10-CM

## 2021-12-20 DIAGNOSIS — M25.561 CHRONIC PAIN OF BOTH KNEES: ICD-10-CM

## 2021-12-20 DIAGNOSIS — M25.562 CHRONIC PAIN OF BOTH KNEES: ICD-10-CM

## 2021-12-20 DIAGNOSIS — M79.652 PAIN IN BOTH THIGHS: ICD-10-CM

## 2021-12-20 DIAGNOSIS — Z23 NEED FOR VACCINATION: ICD-10-CM

## 2021-12-20 LAB
ANION GAP SERPL CALCULATED.3IONS-SCNC: 4 MMOL/L (ref 3–14)
BUN SERPL-MCNC: 14 MG/DL (ref 7–30)
CALCIUM SERPL-MCNC: 9 MG/DL (ref 8.5–10.1)
CHLORIDE BLD-SCNC: 107 MMOL/L (ref 94–109)
CK SERPL-CCNC: 145 U/L (ref 30–225)
CO2 SERPL-SCNC: 28 MMOL/L (ref 20–32)
CREAT SERPL-MCNC: 0.65 MG/DL (ref 0.52–1.04)
ERYTHROCYTE [SEDIMENTATION RATE] IN BLOOD BY WESTERGREN METHOD: 9 MM/HR (ref 0–30)
FERRITIN SERPL-MCNC: 104 NG/ML (ref 8–252)
GFR SERPL CREATININE-BSD FRML MDRD: >90 ML/MIN/1.73M2
GLUCOSE BLD-MCNC: 103 MG/DL (ref 70–99)
HGB BLD-MCNC: 14.4 G/DL (ref 11.7–15.7)
MAGNESIUM SERPL-MCNC: 2.5 MG/DL (ref 1.6–2.3)
POTASSIUM BLD-SCNC: 3.8 MMOL/L (ref 3.4–5.3)
SODIUM SERPL-SCNC: 139 MMOL/L (ref 133–144)

## 2021-12-20 PROCEDURE — 36415 COLL VENOUS BLD VENIPUNCTURE: CPT | Performed by: STUDENT IN AN ORGANIZED HEALTH CARE EDUCATION/TRAINING PROGRAM

## 2021-12-20 PROCEDURE — 85018 HEMOGLOBIN: CPT | Performed by: STUDENT IN AN ORGANIZED HEALTH CARE EDUCATION/TRAINING PROGRAM

## 2021-12-20 PROCEDURE — 90471 IMMUNIZATION ADMIN: CPT | Performed by: STUDENT IN AN ORGANIZED HEALTH CARE EDUCATION/TRAINING PROGRAM

## 2021-12-20 PROCEDURE — 83735 ASSAY OF MAGNESIUM: CPT | Performed by: STUDENT IN AN ORGANIZED HEALTH CARE EDUCATION/TRAINING PROGRAM

## 2021-12-20 PROCEDURE — 99214 OFFICE O/P EST MOD 30 MIN: CPT | Mod: 25 | Performed by: STUDENT IN AN ORGANIZED HEALTH CARE EDUCATION/TRAINING PROGRAM

## 2021-12-20 PROCEDURE — 90682 RIV4 VACC RECOMBINANT DNA IM: CPT | Performed by: STUDENT IN AN ORGANIZED HEALTH CARE EDUCATION/TRAINING PROGRAM

## 2021-12-20 PROCEDURE — 72100 X-RAY EXAM L-S SPINE 2/3 VWS: CPT | Performed by: RADIOLOGY

## 2021-12-20 PROCEDURE — 80048 BASIC METABOLIC PNL TOTAL CA: CPT | Performed by: STUDENT IN AN ORGANIZED HEALTH CARE EDUCATION/TRAINING PROGRAM

## 2021-12-20 PROCEDURE — 86200 CCP ANTIBODY: CPT | Performed by: STUDENT IN AN ORGANIZED HEALTH CARE EDUCATION/TRAINING PROGRAM

## 2021-12-20 PROCEDURE — 85652 RBC SED RATE AUTOMATED: CPT | Performed by: STUDENT IN AN ORGANIZED HEALTH CARE EDUCATION/TRAINING PROGRAM

## 2021-12-20 PROCEDURE — 82550 ASSAY OF CK (CPK): CPT | Performed by: STUDENT IN AN ORGANIZED HEALTH CARE EDUCATION/TRAINING PROGRAM

## 2021-12-20 PROCEDURE — 86431 RHEUMATOID FACTOR QUANT: CPT | Performed by: STUDENT IN AN ORGANIZED HEALTH CARE EDUCATION/TRAINING PROGRAM

## 2021-12-20 PROCEDURE — 82728 ASSAY OF FERRITIN: CPT | Performed by: STUDENT IN AN ORGANIZED HEALTH CARE EDUCATION/TRAINING PROGRAM

## 2021-12-20 ASSESSMENT — PATIENT HEALTH QUESTIONNAIRE - PHQ9
SUM OF ALL RESPONSES TO PHQ QUESTIONS 1-9: 0
5. POOR APPETITE OR OVEREATING: NOT AT ALL

## 2021-12-20 ASSESSMENT — ANXIETY QUESTIONNAIRES
5. BEING SO RESTLESS THAT IT IS HARD TO SIT STILL: NOT AT ALL
1. FEELING NERVOUS, ANXIOUS, OR ON EDGE: NOT AT ALL
2. NOT BEING ABLE TO STOP OR CONTROL WORRYING: NOT AT ALL
6. BECOMING EASILY ANNOYED OR IRRITABLE: NOT AT ALL
7. FEELING AFRAID AS IF SOMETHING AWFUL MIGHT HAPPEN: NOT AT ALL
GAD7 TOTAL SCORE: 0
IF YOU CHECKED OFF ANY PROBLEMS ON THIS QUESTIONNAIRE, HOW DIFFICULT HAVE THESE PROBLEMS MADE IT FOR YOU TO DO YOUR WORK, TAKE CARE OF THINGS AT HOME, OR GET ALONG WITH OTHER PEOPLE: NOT DIFFICULT AT ALL
3. WORRYING TOO MUCH ABOUT DIFFERENT THINGS: NOT AT ALL

## 2021-12-20 ASSESSMENT — MIFFLIN-ST. JEOR: SCORE: 1546.61

## 2021-12-20 NOTE — PATIENT INSTRUCTIONS
Patient Education     So nice to meet you! If the labs do not show a diagnosis (like low iron, rheumatoid arthritis) then the next steps should be getting the ultrasound and seeing a physical therapist.  Eating red meat 1-2 a week. Liver is rich in iron.      Managing Fibromyalgia  Fibromyalgia is a chronic illness that causes pain in specific points on the body, stiffness, and constant tiredness (fatigue). But it doesn t have to keep you from doing what you enjoy. You can feel better. You and your healthcare provider can work together to develop a plan.  Take medicines as directed  You may be given medicines to help reduce pain and improve sleep.  Several medications are approved to treat fibromyalgia. Two were originally designed to treat depression. They are duloxetine, and milnacipran. A third, called pregaballin, was developed to treat nerve pain. Other medicines include pain relievers such as acetaminophen or stronger opioids. These may be prescribed short term.  Nonsteroidal anti-inflammatory drugs (NSAIDs) are used to relieve pain. These include ibuprofen and naproxen.  Get exercise  Gentle exercise can help lessen your pain. Try these tips:    Choose activities that are gentle on your joints, such as walking, biking, and swimming or other water exercises.    Don t push yourself too hard. Build up your strength and endurance slowly, over time.    Stick to it. For the most relief, exercise should become part of your daily life.  Get a good night s rest  To help you get more sleep, try the tips below:    Sleep only in a bed, not on a couch or chair.    Don t watch TV, read, or work in bed.    Go to bed and get up at the same time each day.    Try not to take naps.    Don t use alcohol, caffeine, or tobacco for at least 3 hours before going to bed.    Don t drink fluids in the evening to avoid having to get up to urinate.  Other things you can do  No one knows what causes fibromyalgia. But stress, poor eating  habits, and extra weight can make it worse. These tips may help you feel better:    Eat a balanced diet with plenty of fruits and vegetables, whole grains, lean protein, and low-fat or nonfat dairy products.    Maintain a healthy weight.    Learn ways to reduce or manage the stress in your life.    Ask your healthcare provider for resources to help you make changes. Or check out the resources below.  Resources    Arthritis Foundation  www.arthritis.org    National Fibromyalgia Association  www.fmaware.org    American Fibromyalgia Syndrome Association  www.afsafund.org    Sherri last reviewed this educational content on 6/1/2018

## 2021-12-20 NOTE — LETTER
December 23, 2021      Palak Eckert  [address]  [place] [address]        Palak,     These are your normal blood work results.   I do think physical therapy evaluation is the best next step. I placed a referral for this. You can call [phone] to schedule if year old have not heard from them.   If symptoms don't improve despite that, other imaging (like MRI) might be indicated.       Resulted Orders   Basic metabolic panel  (Ca, Cl, CO2, Creat, Gluc, K, Na, BUN)   Result Value Ref Range    Sodium 139 133 - 144 mmol/L    Potassium 3.8 3.4 - 5.3 mmol/L    Chloride 107 94 - 109 mmol/L    Carbon Dioxide (CO2) 28 20 - 32 mmol/L    Anion Gap 4 3 - 14 mmol/L    Urea Nitrogen 14 7 - 30 mg/dL    Creatinine 0.65 0.52 - 1.04 mg/dL    Calcium 9.0 8.5 - 10.1 mg/dL    Glucose 103 (H) 70 - 99 mg/dL    GFR Estimate >90 >60 mL/min/1.73m2      Comment:      As of July 11, 2021, eGFR is calculated by the CKD-EPI creatinine equation, without race adjustment. eGFR can be influenced by muscle mass, exercise, and diet. The reported eGFR is an estimation only and is only applicable if the renal function is stable.   Magnesium   Result Value Ref Range    Magnesium 2.5 (H) 1.6 - 2.3 mg/dL   CK total   Result Value Ref Range     30 - 225 U/L   Ferritin   Result Value Ref Range    Ferritin 104 8 - 252 ng/mL   Hemoglobin   Result Value Ref Range    Hemoglobin 14.4 11.7 - 15.7 g/dL   Rheumatoid factor   Result Value Ref Range    Rheumatoid Factor 8 <12 IU/mL   Cyclic Citrullinated Peptide Antibody IgG   Result Value Ref Range    Cyclic Citrullinated Peptide Antibody IgG 2.3 <7.0 U/mL      Comment:      Negative   ESR: Erythrocyte sedimentation rate   Result Value Ref Range    Erythrocyte Sedimentation Rate 9 0 - 30 mm/hr         Let me know if you have questions.     Jennifer Hinojosa MD   Internal Medicine & Pediatrics   [facility]

## 2021-12-20 NOTE — PROGRESS NOTES
Assessment & Plan     Muscle soreness  Suspect anterior knee pain was patellofemoral pain syndrome due to increase in activity, but is now no longer present. For the current bilateral knee and hamstring pain, differential diagnosis includes arthropathies (rheumatoid arthritis, osteoarthritis), muscle disease (myositis, dermatomyositis, tight hamstring), restless leg syndrome, claudication (though symptoms improved with exercise), fibromyalgia. Low suspicion for DVT given duration of symptoms for months and bilateral nature.  Obtained labs and X ray today. If no revealing diagnosis from labs, consider [place] ultrasound vs referral to physical therapy. If still persistent, consider lumbar MRI to look for foraminal stenosis vs disc herniation causing neuropathic pain.  Discussed fibromyalgia lack of curative treatment and treatment options including muscle relaxants, antidepressants, physical therapy, stress reduction, and exercise.  Referral to specialists discussed.  Patient not interested at this time.  - Basic metabolic panel  (Ca, Cl, CO2, Creat, Gluc, K, Na, BUN); Future  - Magnesium; Future  - CK total; Future  - Ferritin; Future  - Hemoglobin; Future  - Basic metabolic panel  (Ca, Cl, CO2, Creat, Gluc, K, Na, BUN)  - Magnesium  - CK total  - Ferritin  - Hemoglobin    Need for vaccination  - INFLUENZA QUAD, PF (RIV4) (FLUBLOK)    Chronic pain of both knees  - Rheumatoid factor; Future  - Cyclic Citrullinated Peptide Antibody IgG; Future  - ESR: Erythrocyte sedimentation rate; Future  - Rheumatoid factor  - Cyclic Citrullinated Peptide Antibody IgG  - ESR: Erythrocyte sedimentation rate    Pain in both thighs  No compression fracture or arthritis noted on X ray.  - XR Lumbar Spine 2/3 Views; Future    Return in about 6 weeks (around 1/31/2022) for Annual Wellness Exam.    Jennifer Hinojosa MD  [facility] Cid is a 56 year old who presents for the following health issues      History of Present Illness       She eats 2-3 servings of fruits and vegetables daily.She consumes 1 sweetened beverage(s) daily.She exercises with enough effort to increase her heart rate 9 or less minutes per day.  She exercises with enough effort to increase her heart rate 3 or less days per week.   She is taking medications regularly.       Pain History:  When did you first notice your pain? - More than 6 weeks   Have you seen this provider for your pain in the past? No   Where in your body do your have pain? legs  Are you seeing anyone else for your pain? No  What makes your pain better? moving  What makes your pain worse? resting    How has pain affected your ability to work? 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 , 2 daughters        Chronic Pain - Initial Assessment:    How would you describe your pain? Combination ache and sharp  Have you had any recent changes to the severity or character of your pain? Worsening since June  Is there an underlying cause that has been identified? no  Has your ability to work or do daily activities changed recently because of your pain? No, activity helps  Which of these pain treatments have you tried?   Previous medication treatments:  Other - Ibuprofen        Pain in both hamstrings and lateral knees  Movement helps  Morning stiffness: lasts about 1-2 hours  Improves with activity  Summer started playing tennis and had anterior knee pain that improved with icing and rest; now no longer a problem  No swelling or fluid noted around knees  No previous injury to back, hip, or knees  Pain sometimes go down into lower legs and feet have a strange sensation    Sleeps well - has had feelings of needing ot get up and walk around  -Will wake up maybe after 6 hrs    History left shoulder pain - improved  -range of motion improved  -was told likely frozen shoulder    PHQ 12/20/2021   PHQ-9 Total  "Score 0   Q9: Thoughts of better off dead/self-harm past 2 weeks Not at all     LAWRENCE-7 SCORE 12/20/2021   Total Score 0             Objective    /70 (BP Location: Right arm, Patient Position: Sitting, Cuff Size: Adult Regular)   Pulse 82   Resp 14   Ht 1.6 m (5' 3\")   Wt 98.7 kg (217 lb 11.2 oz)   BMI 38.56 kg/m    Body mass index is 38.56 kg/m .  Physical Exam   GEN: Alert and appropriately interactive for age  EYES: Eyes grossly normal to inspection, conjunctivae and sclerae normal  RESP: Breathing comfortably on room air   CV: Warm and well perfused peripheral extremities   MS: no gross musculoskeletal defects noted, no edema; no joint line tenderness of knees bilateral, no knee effusion present  NEURO: Alert and oriented. Gait normal. Speech fluent.    PSYCH: Affect normal/bright. Appearance well groomed.         "

## 2021-12-21 LAB — RHEUMATOID FACT SER NEPH-ACNC: 8 IU/ML

## 2021-12-21 ASSESSMENT — ANXIETY QUESTIONNAIRES: GAD7 TOTAL SCORE: 0

## 2021-12-22 LAB — CCP AB SER IA-ACNC: 2.3 U/ML

## 2022-01-17 ENCOUNTER — ANCILLARY PROCEDURE (OUTPATIENT)
Dept: GENERAL RADIOLOGY | Facility: CLINIC | Age: 57
End: 2022-01-17
Attending: STUDENT IN AN ORGANIZED HEALTH CARE EDUCATION/TRAINING PROGRAM
Payer: COMMERCIAL

## 2022-01-17 ENCOUNTER — OFFICE VISIT (OUTPATIENT)
Dept: PEDIATRICS | Facility: CLINIC | Age: 57
End: 2022-01-17
Payer: COMMERCIAL

## 2022-01-17 VITALS
OXYGEN SATURATION: 97 % | SYSTOLIC BLOOD PRESSURE: 132 MMHG | WEIGHT: 217.8 LBS | HEIGHT: 63 IN | HEART RATE: 88 BPM | TEMPERATURE: 97.8 F | DIASTOLIC BLOOD PRESSURE: 78 MMHG | RESPIRATION RATE: 16 BRPM | BODY MASS INDEX: 38.59 KG/M2

## 2022-01-17 DIAGNOSIS — M25.561 CHRONIC PAIN OF BOTH KNEES: ICD-10-CM

## 2022-01-17 DIAGNOSIS — Z13.1 SCREENING FOR DIABETES MELLITUS: ICD-10-CM

## 2022-01-17 DIAGNOSIS — Z12.4 SCREENING FOR CERVICAL CANCER: ICD-10-CM

## 2022-01-17 DIAGNOSIS — Z00.00 ROUTINE GENERAL MEDICAL EXAMINATION AT A HEALTH CARE FACILITY: Primary | ICD-10-CM

## 2022-01-17 DIAGNOSIS — M25.562 CHRONIC PAIN OF BOTH KNEES: ICD-10-CM

## 2022-01-17 DIAGNOSIS — Z13.220 SCREENING FOR LIPOID DISORDERS: ICD-10-CM

## 2022-01-17 DIAGNOSIS — G89.29 CHRONIC PAIN OF BOTH KNEES: ICD-10-CM

## 2022-01-17 LAB — HBA1C MFR BLD: 5.7 % (ref 0–5.6)

## 2022-01-17 PROCEDURE — 99213 OFFICE O/P EST LOW 20 MIN: CPT | Mod: 25 | Performed by: STUDENT IN AN ORGANIZED HEALTH CARE EDUCATION/TRAINING PROGRAM

## 2022-01-17 PROCEDURE — 87624 HPV HI-RISK TYP POOLED RSLT: CPT | Performed by: STUDENT IN AN ORGANIZED HEALTH CARE EDUCATION/TRAINING PROGRAM

## 2022-01-17 PROCEDURE — 99396 PREV VISIT EST AGE 40-64: CPT | Performed by: STUDENT IN AN ORGANIZED HEALTH CARE EDUCATION/TRAINING PROGRAM

## 2022-01-17 PROCEDURE — 73560 X-RAY EXAM OF KNEE 1 OR 2: CPT | Mod: 59 | Performed by: RADIOLOGY

## 2022-01-17 PROCEDURE — G0145 SCR C/V CYTO,THINLAYER,RESCR: HCPCS | Performed by: STUDENT IN AN ORGANIZED HEALTH CARE EDUCATION/TRAINING PROGRAM

## 2022-01-17 PROCEDURE — 83036 HEMOGLOBIN GLYCOSYLATED A1C: CPT | Performed by: STUDENT IN AN ORGANIZED HEALTH CARE EDUCATION/TRAINING PROGRAM

## 2022-01-17 PROCEDURE — 36415 COLL VENOUS BLD VENIPUNCTURE: CPT | Performed by: STUDENT IN AN ORGANIZED HEALTH CARE EDUCATION/TRAINING PROGRAM

## 2022-01-17 PROCEDURE — 80061 LIPID PANEL: CPT | Performed by: STUDENT IN AN ORGANIZED HEALTH CARE EDUCATION/TRAINING PROGRAM

## 2022-01-17 SDOH — ECONOMIC STABILITY: INCOME INSECURITY: IN THE LAST 12 MONTHS, WAS THERE A TIME WHEN YOU WERE NOT ABLE TO PAY THE MORTGAGE OR RENT ON TIME?: NO

## 2022-01-17 SDOH — HEALTH STABILITY: PHYSICAL HEALTH: ON AVERAGE, HOW MANY DAYS PER WEEK DO YOU ENGAGE IN MODERATE TO STRENUOUS EXERCISE (LIKE A BRISK WALK)?: 0 DAYS

## 2022-01-17 SDOH — ECONOMIC STABILITY: INCOME INSECURITY: HOW HARD IS IT FOR YOU TO PAY FOR THE VERY BASICS LIKE FOOD, HOUSING, MEDICAL CARE, AND HEATING?: NOT VERY HARD

## 2022-01-17 SDOH — ECONOMIC STABILITY: FOOD INSECURITY: WITHIN THE PAST 12 MONTHS, YOU WORRIED THAT YOUR FOOD WOULD RUN OUT BEFORE YOU GOT MONEY TO BUY MORE.: NEVER TRUE

## 2022-01-17 SDOH — ECONOMIC STABILITY: TRANSPORTATION INSECURITY
IN THE PAST 12 MONTHS, HAS THE LACK OF TRANSPORTATION KEPT YOU FROM MEDICAL APPOINTMENTS OR FROM GETTING MEDICATIONS?: NO

## 2022-01-17 SDOH — ECONOMIC STABILITY: TRANSPORTATION INSECURITY
IN THE PAST 12 MONTHS, HAS LACK OF TRANSPORTATION KEPT YOU FROM MEETINGS, WORK, OR FROM GETTING THINGS NEEDED FOR DAILY LIVING?: NO

## 2022-01-17 SDOH — ECONOMIC STABILITY: FOOD INSECURITY: WITHIN THE PAST 12 MONTHS, THE FOOD YOU BOUGHT JUST DIDN'T LAST AND YOU DIDN'T HAVE MONEY TO GET MORE.: NEVER TRUE

## 2022-01-17 SDOH — HEALTH STABILITY: PHYSICAL HEALTH: ON AVERAGE, HOW MANY MINUTES DO YOU ENGAGE IN EXERCISE AT THIS LEVEL?: 0 MIN

## 2022-01-17 ASSESSMENT — MIFFLIN-ST. JEOR: SCORE: 1540.05

## 2022-01-17 ASSESSMENT — ENCOUNTER SYMPTOMS
MYALGIAS: 1
CONSTIPATION: 0
SHORTNESS OF BREATH: 0
BREAST MASS: 0
HEADACHES: 0
DIARRHEA: 0
ARTHRALGIAS: 1
FEVER: 0
WEAKNESS: 0
FREQUENCY: 0
PALPITATIONS: 0
SORE THROAT: 0
CHILLS: 0
HEARTBURN: 0
HEMATOCHEZIA: 0
NAUSEA: 0
DIZZINESS: 0
ABDOMINAL PAIN: 0
COUGH: 0
DYSURIA: 0
HEMATURIA: 0
NERVOUS/ANXIOUS: 0
JOINT SWELLING: 1
PARESTHESIAS: 0
EYE PAIN: 0

## 2022-01-17 ASSESSMENT — SOCIAL DETERMINANTS OF HEALTH (SDOH)
HOW OFTEN DO YOU ATTEND CHURCH OR RELIGIOUS SERVICES?: MORE THAN 4 TIMES PER YEAR
IN A TYPICAL WEEK, HOW MANY TIMES DO YOU TALK ON THE PHONE WITH FAMILY, FRIENDS, OR NEIGHBORS?: ONCE A WEEK
DO YOU BELONG TO ANY CLUBS OR ORGANIZATIONS SUCH AS CHURCH GROUPS UNIONS, FRATERNAL OR ATHLETIC GROUPS, OR SCHOOL GROUPS?: NO
HOW OFTEN DO YOU GET TOGETHER WITH FRIENDS OR RELATIVES?: NEVER

## 2022-01-17 ASSESSMENT — LIFESTYLE VARIABLES
HOW OFTEN DO YOU HAVE SIX OR MORE DRINKS ON ONE OCCASION: NEVER
HOW OFTEN DO YOU HAVE A DRINK CONTAINING ALCOHOL: NEVER
HOW MANY STANDARD DRINKS CONTAINING ALCOHOL DO YOU HAVE ON A TYPICAL DAY: PATIENT DECLINED

## 2022-01-17 NOTE — PATIENT INSTRUCTIONS
Voltaren (diclofenac) gel   -orange tube   -put it on at night before bed  -can be used on shoulder as well  -if knee pain not improving, may benefit from seeing orthopedic surgeon ([facility])        Preventive Health Recommendations  Female Ages 50 - 64    Yearly exam: See your health care provider every year in order to  o Review health changes.   o Discuss preventive care.    o Review your medicines if your doctor has prescribed any.      Get a Pap test every three years (unless you have an abnormal result and your provider advises testing more often).    If you get Pap tests with HPV test, you only need to test every 5 years, unless you have an abnormal result.     You do not need a Pap test if your uterus was removed (hysterectomy) and you have not had cancer.    You should be tested each year for STDs (sexually transmitted diseases) if you're at risk.     Have a mammogram every 1 to 2 years.    Have a colonoscopy at age 50, or have a yearly FIT test (stool test). These exams screen for colon cancer.      Have a cholesterol test every 5 years, or more often if advised.    Have a diabetes test (fasting glucose) every three years. If you are at risk for diabetes, you should have this test more often.     If you are at risk for osteoporosis (brittle bone disease), think about having a bone density scan (DEXA).    Shots: Get a flu shot each year. Get a tetanus shot every 10 years.    Nutrition:     Eat at least 5 servings of fruits and vegetables each day.    Eat whole-grain bread, whole-wheat pasta and brown rice instead of white grains and rice.    Get adequate Calcium and Vitamin D.     Lifestyle    Exercise at least 150 minutes a week (30 minutes a day, 5 days a week). This will help you control your weight and prevent disease.    Limit alcohol to one drink per day.    No smoking.     Wear sunscreen to prevent skin cancer.     See your dentist every six months for an exam and cleaning.    See your  eye doctor every 1 to 2 years.        SHINGLES VACCINE:  If you are over 50 I recommend the Shingrix vaccine. Two doses of Shingrix provides more than 90% protection against shingles and postherpetic neuralgia (PHN), a type of chronic pain that is the most common complication of shingles.   - even if you've had the older shingles vaccine (Zostavax) it's okay to get the new vaccine.   - even if you've had shingles before the vaccine can be helpful.    Typically the best (and cheapest!) place to get this vaccine is our pharmacy downstairs. The vaccine is a two shot series - I recommend getting the second shot 2-6 months after the first dose.    You may have a sore arm and feel mild flu-like symptoms for a day or two after the vaccine. Most people do not need to adjust their regular activities. It's okay to take Tylenol or ibuprofen if you have side effects.

## 2022-01-17 NOTE — LETTER
"January 18, 2022      Palak VENECIA Babar  [address]  [facility] [address]        Palak,       Here are your lab results.     Your cholesterol levels look good. Your total cholesterol was normal. Your LDL (\"bad cholesterol\") was normal. Your HDL (\"good cholesterol\", helps protect your heart) was a little low. The triglycerides were high, which is expected when you haven't been fasting, but are not at a concerning level.     Your hemoglobin A1c (measures your average blood sugar over the last 3 months) was elevated - this means you have impaired glucose tolerance. Elevation between 5.7-6.4 is also known as \"pre-diabetes.\" This means you are at a higher risk of developing diabetes. Eating healthily, exercising and maintaining a healthy weight is the best way to help prevent this. We will check your blood sugar/hemoglobin a1c to monitor this.     Resulted Orders   Hemoglobin A1c   Result Value Ref Range    Hemoglobin A1C 5.7 (H) 0.0 - 5.6 %      Comment:      Normal <5.7%   Prediabetes 5.7-6.4%    Diabetes 6.5% or higher     Note: Adopted from ADA consensus guidelines.   Lipid panel reflex to direct LDL Fasting   Result Value Ref Range    Cholesterol 177 <200 mg/dL    Triglycerides 218 (H) <150 mg/dL    Direct Measure HDL 42 (L) >=50 mg/dL    LDL Cholesterol Calculated 91 <=100 mg/dL    Non HDL Cholesterol 135 (H) <130 mg/dL    Patient Fasting > 8hrs? Yes     Narrative    Cholesterol  Desirable:  <200 mg/dL    Triglycerides  Normal:  Less than 150 mg/dL  Borderline High:  150-199 mg/dL  High:  200-499 mg/dL  Very High:  Greater than or equal to 500 mg/dL    Direct Measure HDL  Female:  Greater than or equal to 50 mg/dL   Male:  Greater than or equal to 40 mg/dL    LDL Cholesterol  Desirable:  <100mg/dL  Above Desirable:  100-129 mg/dL   Borderline High:  130-159 mg/dL   High:  160-189 mg/dL   Very High:  >= 190 mg/dL    Non HDL Cholesterol  Desirable:  130 mg/dL  Above Desirable:  130-159 mg/dL  Borderline High: "  160-189 mg/dL  High:  190-219 mg/dL  Very High:  Greater than or equal to 220 mg/dL       If you would like to see a dietician to review healthy nutrition, please let me know.         Jennifer Hinojosa MD   Internal Medicine & Pediatrics   [facility]

## 2022-01-17 NOTE — PROGRESS NOTES
SUBJECTIVE:   CC: Palak Eckert is an 56 year old woman who presents for preventive health visit.     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    Knee pain  -Still present  -Work up for rheumatoid arthritis normal  -mostly medial and lateral bilateral knee pain worse at night, better with movement  -sometimes takes ibuprofen (Advil or Motrin) but does not want to take it chronically  -previous bad financial experience with physical therapy and daughter    Today's PHQ-2 Score:   PHQ-2 ( 1999 Pfizer) 1/17/2022   Q1: Little interest or pleasure in doing things 0   Q2: Feeling down, depressed or hopeless 0   PHQ-2 Score 0   PHQ-2 Total Score (12-17 Years)- Positive if 3 or more points; Administer PHQ-A if positive -   Q1: Little interest or pleasure in doing things Not at all   Q2: Feeling down, depressed or hopeless Not at all   PHQ-2 Score 0     Abuse: Current or Past (Physical, Sexual or Emotional) - No  Do you feel safe in your environment? Yes    Have you ever done Advance Care Planning? (For example, a Health Directive, POLST, or a discussion with a medical provider or your loved ones about your wishes): No, advance care planning information given to patient to review.  Patient declined advance care planning discussion at this time.    Social History     Tobacco Use     Smoking status: Never Smoker     Smokeless tobacco: Never Used   Substance Use Topics     Alcohol use: No     Comment: maybe 1 time per  year     If you drink alcohol do you typically have >3 drinks per day or >7 drinks per week? No    Alcohol Use 1/17/2022   Prescreen: >3 drinks/day or >7 drinks/week? Not Applicable   Prescreen: >3 drinks/day or >7 drinks/week? -       Reviewed orders with patient.  Reviewed health maintenance and updated orders accordingly - Yes  Lab work is in process    Breast Cancer Screening:  Any new diagnosis of family breast, ovarian, or bowel cancer? No    FHS-7: No flowsheet data found.    Mammogram Screening: Recommended mammography every 1-2 years with patient discussion and risk factor consideration  Pertinent mammograms are reviewed under the imaging tab.    History of abnormal Pap smear: NO - age 30-65 PAP every 5 years with negative HPV co-testing recommended  PAP / HPV Latest Ref Rng & Units 7/21/2015 8/9/2010 11/28/2008   PAP (Historical) - NIL OTHER-NIL EM>40 NIL   HPV16 NEG Negative - -   HPV18 NEG Negative - -   HRHPV NEG Negative - -     Reviewed and updated as needed this visit by clinical staff  Tobacco  Allergies    Med Hx  Surg Hx  Fam Hx  Soc Hx       Reviewed and updated as needed this visit by Provider                 Review of Systems   Constitutional: Negative for chills and fever.   HENT: Negative for congestion, ear pain, hearing loss and sore throat.    Eyes: Negative for pain and visual disturbance.   Respiratory: Negative for cough and shortness of breath.    Cardiovascular: Negative for chest pain, palpitations and peripheral edema.   Gastrointestinal: Negative for abdominal pain, constipation, diarrhea, heartburn, hematochezia and nausea.   Breasts:  Negative for tenderness, breast mass and discharge.   Genitourinary: Negative for dysuria, frequency, genital sores, hematuria, pelvic pain, urgency, vaginal bleeding and vaginal discharge.   Musculoskeletal: Positive for arthralgias, joint swelling and myalgias.   Skin: Negative for rash.   Neurological: Negative for dizziness, weakness,  "headaches and paresthesias.   Psychiatric/Behavioral: Negative for mood changes. The patient is not nervous/anxious.         OBJECTIVE:   /78 (BP Location: Right arm, Patient Position: Sitting, Cuff Size: Adult Regular)   Pulse 88   Temp 97.8  F (36.6  C) (Temporal)   Resp 16   Ht 1.589 m (5' 2.56\")   Wt 98.8 kg (217 lb 12.8 oz)   SpO2 97%   BMI 39.13 kg/m    Physical Exam  GENERAL: healthy, alert and no distress  EYES: Eyes grossly normal to inspection, conjunctivae and sclerae normal  HENT: ear canals and TM's normal, nose and mouth without ulcers or lesions  NECK: no adenopathy, no asymmetry, masses, or scars and thyroid normal to palpation  RESP: lungs clear to auscultation - no rales, rhonchi or wheezes  CV: regular rate and rhythm, normal S1 S2, no S3 or S4, no murmur, click or rub, no peripheral edema and peripheral pulses strong  ABDOMEN: soft, nontender, no hepatosplenomegaly, no masses and bowel sounds normal   (female): normal female external genitalia, normal urethral meatus, vaginal mucosa pink, moist, well rugated, and normal cervix/adnexa/uterus without masses or discharge  MS: no gross musculoskeletal defects noted, no edema  SKIN: no suspicious lesions or rashes  NEURO: Normal strength and tone, mentation intact and speech normal  PSYCH: mentation appears normal, affect normal/bright      ASSESSMENT/PLAN:       ICD-10-CM    1. Routine general medical examination at a health care facility  Z00.00    2. Screening for lipoid disorders  Z13.220 Lipid panel reflex to direct LDL Fasting     Lipid panel reflex to direct LDL Fasting   3. Screening for cervical cancer  Z12.4 PAP screen with HPV - recommended age 30 - 65 years   4. Screening for diabetes mellitus  Z13.1 Hemoglobin A1c   5. Chronic pain of both knees  M25.561 XR Knee Bilateral 1/2 Views    M25.562     G89.29      5. X-ray without significant signs of osteoarthritis. Previous work up for rheumatoid arthritis normal. Seems to have " "a lot of hamstring tightness bilaterally. Decline physical therapy. Recommend topical Voltaren (diclofenac) gel, and if not improving orthopedic surgery referral.    COUNSELING:  Reviewed preventive health counseling, as reflected in patient instructions   -Declined COVID19 booster today  -Discussed shingles vaccine    Estimated body mass index is 39.13 kg/m  as calculated from the following:    Height as of this encounter: 1.589 m (5' 2.56\").    Weight as of this encounter: 98.8 kg (217 lb 12.8 oz).      She reports that she has never smoked. She has never used smokeless tobacco.    Counseling Resources:  ATP IV Guidelines  Pooled Cohorts Equation Calculator  Breast Cancer Risk Calculator  BRCA-Related Cancer Risk Assessment: FHS-7 Tool  FRAX Risk Assessment  ICSI Preventive Guidelines  Dietary Guidelines for Americans, 2010  USDA's MyPlate  ASA Prophylaxis  Lung CA Screening    Jennifer Hinojosa MD  [facility]  "

## 2022-01-18 LAB
CHOLEST SERPL-MCNC: 177 MG/DL
FASTING STATUS PATIENT QL REPORTED: YES
HDLC SERPL-MCNC: 42 MG/DL
LDLC SERPL CALC-MCNC: 91 MG/DL
NONHDLC SERPL-MCNC: 135 MG/DL
TRIGL SERPL-MCNC: 218 MG/DL

## 2022-01-19 ENCOUNTER — ANCILLARY PROCEDURE (OUTPATIENT)
Dept: MAMMOGRAPHY | Facility: CLINIC | Age: 57
End: 2022-01-19
Payer: COMMERCIAL

## 2022-01-19 DIAGNOSIS — Z12.31 VISIT FOR SCREENING MAMMOGRAM: ICD-10-CM

## 2022-01-19 PROCEDURE — 77067 SCR MAMMO BI INCL CAD: CPT | Mod: TC | Performed by: RADIOLOGY

## 2022-01-20 LAB
BKR LAB AP GYN ADEQUACY: NORMAL
BKR LAB AP GYN INTERPRETATION: NORMAL
BKR LAB AP HPV REFLEX: NORMAL
BKR LAB AP PREVIOUS ABNORMAL: NORMAL
PATH REPORT.COMMENTS IMP SPEC: NORMAL
PATH REPORT.COMMENTS IMP SPEC: NORMAL
PATH REPORT.RELEVANT HX SPEC: NORMAL

## 2022-01-24 LAB
HUMAN PAPILLOMA VIRUS 16 DNA: NEGATIVE
HUMAN PAPILLOMA VIRUS 18 DNA: NEGATIVE
HUMAN PAPILLOMA VIRUS FINAL DIAGNOSIS: NORMAL
HUMAN PAPILLOMA VIRUS OTHER HR: NEGATIVE

## 2022-02-09 ENCOUNTER — TRANSFERRED RECORDS (OUTPATIENT)
Dept: HEALTH INFORMATION MANAGEMENT | Facility: CLINIC | Age: 57
End: 2022-02-09
Payer: COMMERCIAL

## 2022-03-25 ENCOUNTER — TELEPHONE (OUTPATIENT)
Dept: PEDIATRICS | Facility: CLINIC | Age: 57
End: 2022-03-25
Payer: COMMERCIAL

## 2022-03-25 DIAGNOSIS — G89.29 CHRONIC PAIN OF BOTH KNEES: Primary | ICD-10-CM

## 2022-03-25 DIAGNOSIS — M25.562 CHRONIC PAIN OF BOTH KNEES: Primary | ICD-10-CM

## 2022-03-25 DIAGNOSIS — M25.561 CHRONIC PAIN OF BOTH KNEES: Primary | ICD-10-CM

## 2022-03-25 NOTE — TELEPHONE ENCOUNTER
M providing number and informing a referral being placed.    Jyotsna Chou on 3/25/2022 at 4:24 PM

## 2022-03-25 NOTE — TELEPHONE ENCOUNTER
Orthopedic referral placed. Should get called next week to schedule. Can call [phone] if doesn't hear.    Jennifer Hinojosa MD  Internal Medicine & Pediatrics  [facility]

## 2022-03-25 NOTE — TELEPHONE ENCOUNTER
Patient called following up from her visit on 1/17/22. She tried the Voltaren gel which was not effective, she would like to be seen by orthopedic surgery like suggested by Dr. Hinojosa. Michell Roy RN on 3/25/2022 at 2:52 PM

## 2022-03-29 NOTE — PROGRESS NOTES
ASSESSMENT & PLAN    1. Chronic pain of both knees    2. Iliotibial band syndrome, bilateral    3. Hamstring tightness of both lower extremities    4. Primary osteoarthritis of both knees    5. Tendinopathy of gluteus medius, bilateral    6. Adhesive capsulitis of both shoulders      Presenting for evaluation of bilateral knee and hip pain. Overall, evaluation is consistent with kinetic chain dysfunction including hamstring tightness, IT band syndrome and gluteus tendinosis/weakness. Suspect an element of patellar maltracking and knee XR demonstrates mild medial compartment arthritis, although these do not appear to be playing much of a role in her current symptoms. Previous screening inflammatory bloodwork was normal. Upon discussion of management options, patient would like to start with home exercises. She would prefer to hold off on formal physical therapy due to previous coverage/cost issues. She is open to proceeding with this if home exercises are not effective.   - Recommend home exercise program addressing the knee, hip and shoulders. AAOS HEP handouts were provided. Knee and hip conditioning includes stretching of the hamstrings and IT band as well as strengthening of the hamstrings, quadricepts and gluteus muscles. Shoulder rehab to focus on shoulder stretching and range of motion.  - Ok to use ibuprofen or voltaren gel as needed for pain.  - Heat can be quite helpful with rehab to encourage blood flow and flexibility.  - Activity as tolerated based on pain. You may gradually increase your exercises as tolerated as pain, strength and flexibility improve.   - I would be happy to place a formal physical therapy referral (12 sessions) if you would like. Please check with your insurance and let us know via phone or MyChart if you'd like the referral.     Please schedule a follow up appointment to see me in 6-8 weeks as needed for persistence or worsening of pain. You may call our direct clinic number  ([phone]) at any time with questions or concerns.    Courtney Camacho MD, [facility]    -----    SUBJECTIVE  Palak Eckert is a/an 56 year old female who is seen in consultation at the request of  Jennifer Hinojosa M.D. for evaluation of bilateral knee and leg pain. The patient is seen by themselves.    Onset: 6-7 months ago. Reports insidious onset without acute precipitating event. She reports near the onset of of pain she recalls participating in tennis x3 days in a row. Pain has progressively worsened.   Location of Pain: bilateral thigh and knee pain  Worsened by: increased pain at nighttime after laying with having legs extended. Painful and difficult bending the knee. She notes in January she had pain in the posterior knee that radiate to the posterior thigh into the buttocks this posterior pain is intermittent. Lateral thigh pain after prolonged sitting.  Difficulties getting in and out of the car.  Difficulties with putting on shoes and socks.   Better with: with activity, she reports walking and ambulating stairs throughout the day.   Treatments tried: ibuprofen as needed, initial use of ice for acute knee pain in August.  Associated symptoms: dull aching, and stiffness. She reports it feels like DOMs after a workout.  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    Patient also reports left shoulder soreness in June. She reports being told by friend it sounded like Frozen shoulder. She reports that she is currently having pain in the right shoulder and upper back.  Due to left shoulder pain she has switched to sleeping on the right side at nighttime.     Past Medical History:   Diagnosis Date     Unspecified sinusitis (chronic)      Social History     Socioeconomic History     Marital status:      Spouse name: Willie      Number of children: 0     Years of education: 16     Highest education level: Not on file   Occupational History     Occupation: teacher   Tobacco Use     Smoking status: Never Smoker     Smokeless tobacco: Never Used   Substance and Sexual Activity     Alcohol use: No     Comment: maybe 1 time per year     Drug use: No     Sexual activity: Yes     Partners: Male   Other Topics Concern     Parent/sibling w/ CABG, MI or angioplasty before 65F 55M? Not Asked   Social History Narrative     Not on file     Social Determinants of Health     Financial Resource Strain: Low Risk      Difficulty of Paying Living Expenses: Not very hard   Food Insecurity: No Food Insecurity     Worried About Running Out of Food in the Last Year: Never true     Ran Out of Food in the Last Year: Never true   Transportation Needs: No Transportation Needs     Lack of Transportation (Medical): No     Lack of Transportation (Non-Medical): No   Physical Activity: Inactive     Days of Exercise per Week: 0 days     Minutes of Exercise per Session: 0 min   Stress: No Stress Concern Present     Feeling of Stress : Only a little   Social Connections: Moderately Isolated     Frequency of Communication with Friends and Family: Once a week     Frequency of Social Gatherings with Friends and Family: Never     Attends Jain Services: More than 4 times per year     Active Member of Clubs or Organizations: No     Attends Club or Organization Meetings: Not on file     Marital Status:    Intimate Partner Violence: Not on file   Housing Stability: Low Risk      Unable to Pay for Housing in the Last Year: No     Number of Places Lived in the Last Year: 1     Unstable Housing in the Last Year: No         Patient's past medical, surgical, social, and family histories were reviewed today and no changes are noted.    REVIEW OF SYSTEMS:  10 point ROS is negative other than symptoms noted above in HPI, Past Medical History or as stated below  Constitutional:  "NEGATIVE for fever, chills, change in weight  Skin: NEGATIVE for worrisome rashes, moles or lesions  GI/: NEGATIVE for bowel or bladder changes  Neuro: NEGATIVE for weakness, dizziness or paresthesias    OBJECTIVE:  /70 (BP Location: Right arm, Patient Position: Chair, Cuff Size: Adult Regular)   Ht 1.589 m (5' 2.56\")   Wt 98.4 kg (217 lb)   BMI 38.98 kg/m     General: healthy, alert and in no distress  HEENT: no scleral icterus or conjunctival erythema  Skin: no suspicious lesions or rash. No jaundice.  CV: no pedal edema  Resp: normal respiratory effort without conversational dyspnea   Psych: normal mood and affect  Gait: normal steady gait with appropriate coordination and balance  Neuro: Normal light sensory exam of lower extremity  MSK:  BILATERAL KNEE  Inspection:    normal alignment  Palpation:    Tender about the lateral patellar facet (mild right), medial joint line (mild right), distal IT band (bilat) and lateral distal hamstring tendons (bilateral). Remainder of bony and ligamentous landmarks are nontender.    No effusion is present    Patellofemoral crepitus is Present  Range of Motion:     00 extension to 1200 flexion    Hamstring ROM limited due to tightness  Strength:    Quadriceps 5-/5, hamstrings 4+/5 and gluteus medius 4+/5    Extensor mechanism intact  Special Tests:    Positive: resisted hamstring strength testing, resisted gluteus medius testing, obers    Negative: Patellar grind, MCL/valgus stress (0 & 30 deg), LCL/varus stress (0 & 30 deg), Lachman's, anterior drawer, posterior drawer, Sharonda's    BILATERAL HIP  Inspection:    No swelling, bruising, discoloration, or obvious deformity or asymmetry  Palpation:    Tender about the greater trochanteric region, gluteus medius insertion and gluteal muscles. Tight IT bands noted (R>L). Otherwise all other landmarks are nontender.    Crepitus is Absent  Active Range of Motion:     Hip ROM within normal limits  Strength:    Flexion 5-/5 / " extension 4+/5 / adduction 4+/5 / abduction 4+/5  Special Tests:    Positive: Resisted gluteus medius provocation, anterior impingement (FADIR), Edilma's    Negative: KAREN Vinson Thomas    Independent visualization of the below image:    KNEE BILATERAL ONE TO TWO VIEWS January 17, 2022 4:12 PM      INDICATION: Bilateral knee pain.      COMPARISON: None.                                                                      IMPRESSION:  1.  Right knee: Mild degenerative arthrosis with medial compartment  narrowing and marginal osteophytosis. No fracture.  2.  Left knee: Mild degenerative arthrosis with medial compartment  narrowing and marginal osteophytosis. No fracture.     MD Courtney HARRISON MD, [facility]

## 2022-04-06 ENCOUNTER — OFFICE VISIT (OUTPATIENT)
Dept: ORTHOPEDICS | Facility: CLINIC | Age: 57
End: 2022-04-06
Attending: STUDENT IN AN ORGANIZED HEALTH CARE EDUCATION/TRAINING PROGRAM
Payer: COMMERCIAL

## 2022-04-06 VITALS
DIASTOLIC BLOOD PRESSURE: 70 MMHG | SYSTOLIC BLOOD PRESSURE: 122 MMHG | BODY MASS INDEX: 38.45 KG/M2 | HEIGHT: 63 IN | WEIGHT: 217 LBS

## 2022-04-06 DIAGNOSIS — M17.0 PRIMARY OSTEOARTHRITIS OF BOTH KNEES: ICD-10-CM

## 2022-04-06 DIAGNOSIS — M62.9 HAMSTRING TIGHTNESS OF BOTH LOWER EXTREMITIES: ICD-10-CM

## 2022-04-06 DIAGNOSIS — M75.02 ADHESIVE CAPSULITIS OF BOTH SHOULDERS: ICD-10-CM

## 2022-04-06 DIAGNOSIS — M25.561 CHRONIC PAIN OF BOTH KNEES: Primary | ICD-10-CM

## 2022-04-06 DIAGNOSIS — M25.562 CHRONIC PAIN OF BOTH KNEES: Primary | ICD-10-CM

## 2022-04-06 DIAGNOSIS — M75.01 ADHESIVE CAPSULITIS OF BOTH SHOULDERS: ICD-10-CM

## 2022-04-06 DIAGNOSIS — G89.29 CHRONIC PAIN OF BOTH KNEES: Primary | ICD-10-CM

## 2022-04-06 DIAGNOSIS — M67.959 TENDINOPATHY OF GLUTEUS MEDIUS: ICD-10-CM

## 2022-04-06 DIAGNOSIS — M76.30 ILIOTIBIAL BAND SYNDROME, UNSPECIFIED LATERALITY: ICD-10-CM

## 2022-04-06 PROCEDURE — 99204 OFFICE O/P NEW MOD 45 MIN: CPT | Performed by: STUDENT IN AN ORGANIZED HEALTH CARE EDUCATION/TRAINING PROGRAM

## 2022-04-06 NOTE — PATIENT INSTRUCTIONS
1. Chronic pain of both knees    2. Iliotibial band syndrome, bilateral    3. Hamstring tightness of both lower extremities    4. Primary osteoarthritis of both knees    5. Tendinopathy of gluteus medius, bilateral    6. Adhesive capsulitis of both shoulders      - Recommend home exercise program addressing the knee, hip and shoulders. Knee and hip conditioning includes stretching of the hamstrings and IT band as well as strengthening of the hamstrings, quadricepts and gluteus muscles. Shoulder rehab to focus on shoulder stretching and range of motion.  - Ok to use ibuprofen or voltaren gel as needed for pain.  - Heat can be quite helpful with rehab to encourage blood flow and flexibility.  - Activity as tolerated based on pain. You may gradually increase your exercises as tolerated as pain, strength and flexibility improve.   - I would be happy to place a formal physical therapy referral (12 sessions) if you would like. Please check with your insurance and let us know via phone or MyChart if you'd like the referral.     Please schedule a follow up appointment to see me in 6-8 weeks as needed for persistence or worsening of pain. You may call our direct clinic number ([phone]) at any time with questions or concerns.    Courtney Camacho MD, [facility]

## 2022-04-06 NOTE — LETTER
4/6/2022         RE: Palak Eckert  [address]  [place] [address]        Dear Colleague,    Thank you for referring your patient, Palak Eckert, to the [facility] [place]. Please see a copy of my visit note below.    ASSESSMENT & PLAN    1. Chronic pain of both knees    2. Iliotibial band syndrome, bilateral    3. Hamstring tightness of both lower extremities    4. Primary osteoarthritis of both knees    5. Tendinopathy of gluteus medius, bilateral    6. Adhesive capsulitis of both shoulders      Presenting for evaluation of bilateral knee and hip pain. Overall, evaluation is consistent with kinetic chain dysfunction including hamstring tightness, IT band syndrome and gluteus tendinosis/weakness. Suspect an element of patellar maltracking and knee XR demonstrates mild medial compartment arthritis, although these do not appear to be playing much of a role in her current symptoms. Previous screening inflammatory bloodwork was normal. Upon discussion of management options, patient would like to start with home exercises. She would prefer to hold off on formal physical therapy due to previous coverage/cost issues. She is open to proceeding with this if home exercises are not effective.   - Recommend home exercise program addressing the knee, hip and shoulders. AAOS HEP handouts were provided. Knee and hip conditioning includes stretching of the hamstrings and IT band as well as strengthening of the hamstrings, quadricepts and gluteus muscles. Shoulder rehab to focus on shoulder stretching and range of motion.  - Ok to use ibuprofen or voltaren gel as needed for pain.  - Heat can be quite helpful with rehab to encourage blood flow and flexibility.  - Activity as tolerated based on pain. You may gradually increase your exercises as tolerated as pain, strength and flexibility improve.   - I would be happy to place a formal physical therapy referral (12 sessions) if you  would like. Please check with your insurance and let us know via phone or MyChart if you'd like the referral.     Please schedule a follow up appointment to see me in 6-8 weeks as needed for persistence or worsening of pain. You may call our direct clinic number ([phone]) at any time with questions or concerns.    Courtney Camacho MD, [facility]    -----    SUBJECTIVE  Palak Eckert is a/an 56 year old female who is seen in consultation at the request of  Jennifer Hinojosa M.D. for evaluation of bilateral knee and leg pain. The patient is seen by themselves.    Onset: 6-7 months ago. Reports insidious onset without acute precipitating event. She reports near the onset of of pain she recalls participating in tennis x3 days in a row. Pain has progressively worsened.   Location of Pain: bilateral thigh and knee pain  Worsened by: increased pain at nighttime after laying with having legs extended. Painful and difficult bending the knee. She notes in January she had pain in the posterior knee that radiate to the posterior thigh into the buttocks this posterior pain is intermittent. Lateral thigh pain after prolonged sitting.  Difficulties getting in and out of the car.  Difficulties with putting on shoes and socks.   Better with: with activity, she reports walking and ambulating stairs throughout the day.   Treatments tried: ibuprofen as needed, initial use of ice for acute knee pain in August.  Associated symptoms: dull aching, and stiffness. She reports it feels like DOMs after a workout.  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    Patient also reports left shoulder soreness in June. She reports being told by friend it sounded like Frozen shoulder. She reports that she is currently having pain in the right shoulder and upper back.  Due to  left shoulder pain she has switched to sleeping on the right side at nighttime.     Past Medical History:   Diagnosis Date     Unspecified sinusitis (chronic)      Social History     Socioeconomic History     Marital status:      Spouse name: Bill     Number of children: 0     Years of education: 16     Highest education level: Not on file   Occupational History     Occupation: teacher   Tobacco Use     Smoking status: Never Smoker     Smokeless tobacco: Never Used   Substance and Sexual Activity     Alcohol use: No     Comment: maybe 1 time per year     Drug use: No     Sexual activity: Yes     Partners: Male   Other Topics Concern     Parent/sibling w/ CABG, MI or angioplasty before 65F 55M? Not Asked   Social History Narrative     Not on file     Social Determinants of Health     Financial Resource Strain: Low Risk      Difficulty of Paying Living Expenses: Not very hard   Food Insecurity: No Food Insecurity     Worried About Running Out of Food in the Last Year: Never true     Ran Out of Food in the Last Year: Never true   Transportation Needs: No Transportation Needs     Lack of Transportation (Medical): No     Lack of Transportation (Non-Medical): No   Physical Activity: Inactive     Days of Exercise per Week: 0 days     Minutes of Exercise per Session: 0 min   Stress: No Stress Concern Present     Feeling of Stress : Only a little   Social Connections: Moderately Isolated     Frequency of Communication with Friends and Family: Once a week     Frequency of Social Gatherings with Friends and Family: Never     Attends Roman [employer] Services: More than 4 times per year     Active Member of Clubs or Organizations: No     Attends Club or Organization Meetings: Not on file     Marital Status:    Intimate Partner Violence: Not on file   Housing Stability: Low Risk      Unable to Pay for Housing in the Last Year: No     Number of Places Lived in the Last Year: 1     Unstable Housing in the Last Year: No  "        Patient's past medical, surgical, social, and family histories were reviewed today and no changes are noted.    REVIEW OF SYSTEMS:  10 point ROS is negative other than symptoms noted above in HPI, Past Medical History or as stated below  Constitutional: NEGATIVE for fever, chills, change in weight  Skin: NEGATIVE for worrisome rashes, moles or lesions  GI/: NEGATIVE for bowel or bladder changes  Neuro: NEGATIVE for weakness, dizziness or paresthesias    OBJECTIVE:  /70 (BP Location: Right arm, Patient Position: Chair, Cuff Size: Adult Regular)   Ht 1.589 m (5' 2.56\")   Wt 98.4 kg (217 lb)   BMI 38.98 kg/m     General: healthy, alert and in no distress  HEENT: no scleral icterus or conjunctival erythema  Skin: no suspicious lesions or rash. No jaundice.  CV: no pedal edema  Resp: normal respiratory effort without conversational dyspnea   Psych: normal mood and affect  Gait: normal steady gait with appropriate coordination and balance  Neuro: Normal light sensory exam of lower extremity  MSK:  BILATERAL KNEE  Inspection:    normal alignment  Palpation:    Tender about the lateral patellar facet (mild right), medial joint line (mild right), distal IT band (bilat) and lateral distal hamstring tendons (bilateral). Remainder of bony and ligamentous landmarks are nontender.    No effusion is present    Patellofemoral crepitus is Present  Range of Motion:     00 extension to 1200 flexion    Hamstring ROM limited due to tightness  Strength:    Quadriceps 5-/5, hamstrings 4+/5 and gluteus medius 4+/5    Extensor mechanism intact  Special Tests:    Positive: resisted hamstring strength testing, resisted gluteus medius testing, obers    Negative: Patellar grind, MCL/valgus stress (0 & 30 deg), LCL/varus stress (0 & 30 deg), Lachman's, anterior drawer, posterior drawer, Sharonda's    BILATERAL HIP  Inspection:    No swelling, bruising, discoloration, or obvious deformity or asymmetry  Palpation:    Tender " about the greater trochanteric region, gluteus medius insertion and gluteal muscles. Tight IT bands noted (R>L). Otherwise all other landmarks are nontender.    Crepitus is Absent  Active Range of Motion:     Hip ROM within normal limits  Strength:    Flexion 5-/5 / extension 4+/5 / adduction 4+/5 / abduction 4+/5  Special Tests:    Positive: Resisted gluteus medius provocation, anterior impingement (FADIR), Edilma's    Negative: KAREN Vinson Thomas    Independent visualization of the below image:    KNEE BILATERAL ONE TO TWO VIEWS January 17, 2022 4:12 PM      INDICATION: Bilateral knee pain.      COMPARISON: None.                                                                      IMPRESSION:  1.  Right knee: Mild degenerative arthrosis with medial compartment  narrowing and marginal osteophytosis. No fracture.  2.  Left knee: Mild degenerative arthrosis with medial compartment  narrowing and marginal osteophytosis. No fracture.     MD Courtney HARRISON MD, [facility]        Again, thank you for allowing me to participate in the care of your patient.        Sincerely,        Courtney Camacho MD

## 2022-07-11 ENCOUNTER — TRANSFERRED RECORDS (OUTPATIENT)
Dept: PEDIATRICS | Facility: CLINIC | Age: 57
End: 2022-07-11

## 2023-01-02 ENCOUNTER — APPOINTMENT (OUTPATIENT)
Dept: GENERAL RADIOLOGY | Facility: CLINIC | Age: 58
End: 2023-01-02
Attending: EMERGENCY MEDICINE
Payer: COMMERCIAL

## 2023-01-02 ENCOUNTER — HOSPITAL ENCOUNTER (INPATIENT)
Facility: CLINIC | Age: 58
LOS: 2 days | Discharge: ANOTHER HOSPITAL | End: 2023-01-04
Attending: EMERGENCY MEDICINE | Admitting: INTERNAL MEDICINE
Payer: COMMERCIAL

## 2023-01-02 ENCOUNTER — TELEPHONE (OUTPATIENT)
Dept: PEDIATRICS | Facility: CLINIC | Age: 58
End: 2023-01-02

## 2023-01-02 DIAGNOSIS — I21.4 NSTEMI (NON-ST ELEVATED MYOCARDIAL INFARCTION) (H): ICD-10-CM

## 2023-01-02 LAB
ALBUMIN SERPL BCG-MCNC: 4.7 G/DL (ref 3.5–5.2)
ALP SERPL-CCNC: 110 U/L (ref 35–104)
ALT SERPL W P-5'-P-CCNC: 21 U/L (ref 10–35)
ANION GAP SERPL CALCULATED.3IONS-SCNC: 12 MMOL/L (ref 7–15)
AST SERPL W P-5'-P-CCNC: 37 U/L (ref 10–35)
ATRIAL RATE - MUSE: 56 BPM
BASOPHILS # BLD AUTO: 0.1 10E3/UL (ref 0–0.2)
BASOPHILS NFR BLD AUTO: 1 %
BILIRUB DIRECT SERPL-MCNC: <0.2 MG/DL (ref 0–0.3)
BILIRUB SERPL-MCNC: 0.5 MG/DL
BUN SERPL-MCNC: 10.2 MG/DL (ref 6–20)
CALCIUM SERPL-MCNC: 9.1 MG/DL (ref 8.6–10)
CHLORIDE SERPL-SCNC: 103 MMOL/L (ref 98–107)
CREAT SERPL-MCNC: 0.7 MG/DL (ref 0.51–0.95)
D DIMER PPP FEU-MCNC: 0.27 UG/ML FEU (ref 0–0.5)
DEPRECATED HCO3 PLAS-SCNC: 24 MMOL/L (ref 22–29)
DIASTOLIC BLOOD PRESSURE - MUSE: NORMAL MMHG
EOSINOPHIL # BLD AUTO: 0.4 10E3/UL (ref 0–0.7)
EOSINOPHIL NFR BLD AUTO: 4 %
ERYTHROCYTE [DISTWIDTH] IN BLOOD BY AUTOMATED COUNT: 13.6 % (ref 10–15)
FLUAV RNA SPEC QL NAA+PROBE: NEGATIVE
FLUBV RNA RESP QL NAA+PROBE: NEGATIVE
GFR SERPL CREATININE-BSD FRML MDRD: >90 ML/MIN/1.73M2
GLUCOSE SERPL-MCNC: 150 MG/DL (ref 70–99)
HCT VFR BLD AUTO: 45.6 % (ref 35–47)
HGB BLD-MCNC: 14 G/DL (ref 11.7–15.7)
HOLD SPECIMEN: NORMAL
IMM GRANULOCYTES # BLD: 0 10E3/UL
IMM GRANULOCYTES NFR BLD: 0 %
INTERPRETATION ECG - MUSE: NORMAL
LIPASE SERPL-CCNC: 22 U/L (ref 13–60)
LYMPHOCYTES # BLD AUTO: 3.7 10E3/UL (ref 0.8–5.3)
LYMPHOCYTES NFR BLD AUTO: 35 %
MCH RBC QN AUTO: 29.9 PG (ref 26.5–33)
MCHC RBC AUTO-ENTMCNC: 30.7 G/DL (ref 31.5–36.5)
MCV RBC AUTO: 97 FL (ref 78–100)
MONOCYTES # BLD AUTO: 0.7 10E3/UL (ref 0–1.3)
MONOCYTES NFR BLD AUTO: 7 %
NEUTROPHILS # BLD AUTO: 5.6 10E3/UL (ref 1.6–8.3)
NEUTROPHILS NFR BLD AUTO: 53 %
NRBC # BLD AUTO: 0 10E3/UL
NRBC BLD AUTO-RTO: 0 /100
P AXIS - MUSE: 31 DEGREES
PLATELET # BLD AUTO: 379 10E3/UL (ref 150–450)
POTASSIUM SERPL-SCNC: 4.6 MMOL/L (ref 3.4–5.3)
PR INTERVAL - MUSE: 150 MS
PROT SERPL-MCNC: 7.6 G/DL (ref 6.4–8.3)
QRS DURATION - MUSE: 94 MS
QT - MUSE: 478 MS
QTC - MUSE: 461 MS
R AXIS - MUSE: 9 DEGREES
RBC # BLD AUTO: 4.69 10E6/UL (ref 3.8–5.2)
RSV RNA SPEC NAA+PROBE: NEGATIVE
SARS-COV-2 RNA RESP QL NAA+PROBE: NEGATIVE
SODIUM SERPL-SCNC: 139 MMOL/L (ref 136–145)
SYSTOLIC BLOOD PRESSURE - MUSE: NORMAL MMHG
T AXIS - MUSE: -6 DEGREES
TROPONIN T SERPL HS-MCNC: 251 NG/L
TROPONIN T SERPL HS-MCNC: 75 NG/L
TROPONIN T SERPL HS-MCNC: <6 NG/L
UFH PPP CHRO-ACNC: <0.1 IU/ML
VENTRICULAR RATE- MUSE: 56 BPM
WBC # BLD AUTO: 10.6 10E3/UL (ref 4–11)

## 2023-01-02 PROCEDURE — 250N000013 HC RX MED GY IP 250 OP 250 PS 637: Performed by: EMERGENCY MEDICINE

## 2023-01-02 PROCEDURE — 85379 FIBRIN DEGRADATION QUANT: CPT | Performed by: EMERGENCY MEDICINE

## 2023-01-02 PROCEDURE — 93005 ELECTROCARDIOGRAM TRACING: CPT

## 2023-01-02 PROCEDURE — 36415 COLL VENOUS BLD VENIPUNCTURE: CPT | Performed by: EMERGENCY MEDICINE

## 2023-01-02 PROCEDURE — 84484 ASSAY OF TROPONIN QUANT: CPT | Performed by: INTERNAL MEDICINE

## 2023-01-02 PROCEDURE — 99223 1ST HOSP IP/OBS HIGH 75: CPT | Mod: AI | Performed by: INTERNAL MEDICINE

## 2023-01-02 PROCEDURE — 83690 ASSAY OF LIPASE: CPT | Performed by: EMERGENCY MEDICINE

## 2023-01-02 PROCEDURE — 96374 THER/PROPH/DIAG INJ IV PUSH: CPT

## 2023-01-02 PROCEDURE — 84484 ASSAY OF TROPONIN QUANT: CPT | Performed by: EMERGENCY MEDICINE

## 2023-01-02 PROCEDURE — 80053 COMPREHEN METABOLIC PANEL: CPT | Performed by: EMERGENCY MEDICINE

## 2023-01-02 PROCEDURE — 250N000013 HC RX MED GY IP 250 OP 250 PS 637: Performed by: INTERNAL MEDICINE

## 2023-01-02 PROCEDURE — 99285 EMERGENCY DEPT VISIT HI MDM: CPT | Mod: 25

## 2023-01-02 PROCEDURE — 71046 X-RAY EXAM CHEST 2 VIEWS: CPT

## 2023-01-02 PROCEDURE — 36415 COLL VENOUS BLD VENIPUNCTURE: CPT | Performed by: INTERNAL MEDICINE

## 2023-01-02 PROCEDURE — 85520 HEPARIN ASSAY: CPT | Performed by: INTERNAL MEDICINE

## 2023-01-02 PROCEDURE — 85025 COMPLETE CBC W/AUTO DIFF WBC: CPT | Performed by: EMERGENCY MEDICINE

## 2023-01-02 PROCEDURE — 80048 BASIC METABOLIC PNL TOTAL CA: CPT | Performed by: EMERGENCY MEDICINE

## 2023-01-02 PROCEDURE — 120N000001 HC R&B MED SURG/OB

## 2023-01-02 PROCEDURE — C9803 HOPD COVID-19 SPEC COLLECT: HCPCS

## 2023-01-02 PROCEDURE — 87637 SARSCOV2&INF A&B&RSV AMP PRB: CPT | Performed by: EMERGENCY MEDICINE

## 2023-01-02 PROCEDURE — 93005 ELECTROCARDIOGRAM TRACING: CPT | Mod: 76

## 2023-01-02 PROCEDURE — 250N000011 HC RX IP 250 OP 636: Performed by: EMERGENCY MEDICINE

## 2023-01-02 PROCEDURE — 82248 BILIRUBIN DIRECT: CPT | Performed by: EMERGENCY MEDICINE

## 2023-01-02 RX ORDER — LIDOCAINE 40 MG/G
CREAM TOPICAL
Status: DISCONTINUED | OUTPATIENT
Start: 2023-01-02 | End: 2023-01-04 | Stop reason: HOSPADM

## 2023-01-02 RX ORDER — ATORVASTATIN CALCIUM 40 MG/1
40 TABLET, FILM COATED ORAL EVERY EVENING
Status: DISCONTINUED | OUTPATIENT
Start: 2023-01-02 | End: 2023-01-04 | Stop reason: HOSPADM

## 2023-01-02 RX ORDER — IBUPROFEN 200 MG
200 TABLET ORAL EVERY 4 HOURS PRN
Status: ON HOLD | COMMUNITY
End: 2023-01-04

## 2023-01-02 RX ORDER — MULTIPLE VITAMINS W/ MINERALS TAB 9MG-400MCG
1 TAB ORAL DAILY
Status: DISCONTINUED | OUTPATIENT
Start: 2023-01-02 | End: 2023-01-04 | Stop reason: HOSPADM

## 2023-01-02 RX ORDER — HEPARIN SODIUM 10000 [USP'U]/100ML
0-5000 INJECTION, SOLUTION INTRAVENOUS CONTINUOUS
Status: DISCONTINUED | OUTPATIENT
Start: 2023-01-02 | End: 2023-01-03

## 2023-01-02 RX ORDER — NITROGLYCERIN 0.4 MG/1
0.4 TABLET SUBLINGUAL EVERY 5 MIN PRN
Status: COMPLETED | OUTPATIENT
Start: 2023-01-02 | End: 2023-01-02

## 2023-01-02 RX ORDER — HEPARIN SODIUM 10000 [USP'U]/100ML
0-5000 INJECTION, SOLUTION INTRAVENOUS CONTINUOUS
Status: DISCONTINUED | OUTPATIENT
Start: 2023-01-02 | End: 2023-01-02 | Stop reason: DRUGHIGH

## 2023-01-02 RX ORDER — SODIUM CHLORIDE 9 MG/ML
INJECTION, SOLUTION INTRAVENOUS CONTINUOUS
Status: DISCONTINUED | OUTPATIENT
Start: 2023-01-02 | End: 2023-01-03

## 2023-01-02 RX ORDER — METOPROLOL TARTRATE 25 MG/1
25 TABLET, FILM COATED ORAL 2 TIMES DAILY
Status: DISCONTINUED | OUTPATIENT
Start: 2023-01-02 | End: 2023-01-04 | Stop reason: HOSPADM

## 2023-01-02 RX ORDER — ASPIRIN 325 MG
325 TABLET ORAL ONCE
Status: COMPLETED | OUTPATIENT
Start: 2023-01-02 | End: 2023-01-02

## 2023-01-02 RX ADMIN — HEPARIN SODIUM 1200 UNITS/HR: 10000 INJECTION, SOLUTION INTRAVENOUS at 15:15

## 2023-01-02 RX ADMIN — NITROGLYCERIN 0.4 MG: 0.4 TABLET SUBLINGUAL at 14:26

## 2023-01-02 RX ADMIN — ATORVASTATIN CALCIUM 40 MG: 40 TABLET, FILM COATED ORAL at 22:13

## 2023-01-02 RX ADMIN — ASPIRIN 325 MG ORAL TABLET 325 MG: 325 PILL ORAL at 14:24

## 2023-01-02 RX ADMIN — MULTIPLE VITAMINS W/ MINERALS TAB 1 TABLET: TAB at 18:32

## 2023-01-02 RX ADMIN — METOPROLOL TARTRATE 25 MG: 25 TABLET, FILM COATED ORAL at 22:13

## 2023-01-02 RX ADMIN — NITROGLYCERIN 0.4 MG: 0.4 TABLET SUBLINGUAL at 14:33

## 2023-01-02 RX ADMIN — NITROGLYCERIN 0.4 MG: 0.4 TABLET SUBLINGUAL at 14:23

## 2023-01-02 ASSESSMENT — ACTIVITIES OF DAILY LIVING (ADL)
ADLS_ACUITY_SCORE: 35

## 2023-01-02 ASSESSMENT — ENCOUNTER SYMPTOMS
ABDOMINAL PAIN: 0
NAUSEA: 1
SORE THROAT: 0
CHILLS: 1
ARTHRALGIAS: 1
COUGH: 0
HEADACHES: 0
WEAKNESS: 1
APPETITE CHANGE: 1
SHORTNESS OF BREATH: 1
DIAPHORESIS: 1
CHEST TIGHTNESS: 1

## 2023-01-02 NOTE — ED PROVIDER NOTES
History   Chief Complaint:  Chest Pain    The history is provided by the patient.      Palak Eckert is a 57 year old female who presents with chest pain onset eight hours ago. Palak explains that she experienced some left arm and shoulder pain eight days ago then returned to baseline until this morning. Upon waking, Palak reports weakness, nausea, diaphoresis, and chills in addition to her substernal and left-sided chest pressure. She additionally reports decreased appetite, minor shortness of breath, and temperature flashes. Denies cough, sore throat, headache, abdominal pain, and leg swelling. Denies known sickness exposure. She reports pain exacerbation with lying supine. Denies pain exacerbation with respirations and eating. Palak mentions arthralgias for the past year. She denies any other symptoms.    Independent Historian: None.      Review of External Notes: None     ROS:  Review of Systems   Constitutional: Positive for appetite change, chills and diaphoresis.   HENT: Negative for sore throat.    Respiratory: Positive for chest tightness and shortness of breath. Negative for cough.    Cardiovascular: Positive for chest pain. Negative for leg swelling.   Gastrointestinal: Positive for nausea. Negative for abdominal pain.   Endocrine: Positive for cold intolerance and heat intolerance.   Musculoskeletal: Positive for arthralgias.   Neurological: Positive for weakness. Negative for headaches.   All other systems reviewed and are negative.    Allergies:  The patient has no known drug allergies.     Medications:    The patient has no daily or prescription medications.     Past Medical History:    The patient has no past pertinent medical history. The patient denies history of asthma or lung disease.     Past Surgical History:    Colonoscopy x 2   Sinus operation     Family History:    Her family history includes Cancer in her father; Diabetes in her father; Respiratory in her father.    Social  History:  She reports that she has never smoked. She has never used smokeless tobacco. She reports that she does not drink alcohol and does not use drugs.  PCP: Ml Paz APRN CNP    Physical Exam     Patient Vitals for the past 24 hrs:   BP Temp Temp src Pulse Resp SpO2 Weight   01/02/23 1531 -- -- -- 67 16 96 % --   01/02/23 1516 -- -- -- 75 12 97 % --   01/02/23 1501 117/68 -- -- 76 17 95 % --   01/02/23 1446 102/65 -- -- 87 11 96 % --   01/02/23 1431 106/70 -- -- 78 10 96 % --   01/02/23 1421 (!) 148/90 -- -- 96 (!) 32 -- --   01/02/23 1416 -- -- -- 75 15 97 % --   01/02/23 1410 -- -- -- 77 14 99 % --   01/02/23 1400 -- -- -- -- -- -- 100.2 kg (221 lb)   01/02/23 1355 (!) 142/86 -- -- 74 14 98 % --   01/02/23 1340 -- -- -- 71 16 97 % --   01/02/23 1333 -- -- -- 77 15 99 % --   01/02/23 1332 (!) 147/89 -- -- 74 22 -- --   01/02/23 0742 (!) 161/88 -- -- -- -- -- --   01/02/23 0740 -- 97  F (36.1  C) Temporal 68 18 100 % --     Physical Exam  General: Nontoxic. Resting comfortably  Head:  Scalp, face, and head appear normal  Eyes:  Pupils are equal, round    Conjunctivae non-injected and sclerae white  ENT:    The external nose is normal    Pinnae are normal  Neck:  Normal range of motion    There is no rigidity noted    Trachea is in the midline  CV:  Regular rate and rhythm     Normal S1/S2, no S3/S4    No murmur or rub. Radial pulses 2+ bilaterally.  Resp:  Lungs are clear and equal bilaterally  There is no tachypnea    No increased work of breathing    No rales, wheezing, or rhonchi  GI:  Abdomen is soft, obese, no rigidity or guarding    No distension, or mass    No tenderness or rebound tenderness. No RUQ or epigastric tenderness to palpation. Hornick sign neg.   MS:  Normal muscular tone    Symmetric motor strength    No lower extremity edema. No calf swelling or tenderness.  Skin:  No rash or acute skin lesions noted  Neuro: Awake and alert  Speech is normal and fluent  Moves all extremities  spontaneously  Psych:  Normal affect. Appropriate interactions.    Emergency Department Course   ECG 1:  ECG taken at 0745, ECG read at 0748  Sinus bradycardia   Nonspecific ST abnormality   Abnormal ECG  Rate 56 bpm. MT interval 150 ms. QRS duration 94 ms. QT/QTc 478/461 ms. P-R-T axes 31 9 -6.    ECG 2:  ECG taken at 1432, ECG read at 1439  Normal sinus rhythm  Minimal voltage criteria for LVH, may be normal variant   Nonspecific ST abnormality   Prolonged QT   Abnormal ECG  Rate 93 bpm. MT interval 152 ms. QRS duration 86 ms. QT/QTc 398/494 ms. P-R-T axes 44 -10 18.    Imaging:  XR Chest 2 Views   Final Result   IMPRESSION: PA and lateral views of the chest were obtained.   Cardiomediastinal silhouette is within normal limits. No suspicious   focal pulmonary opacities. No significant pleural effusion or   pneumothorax.      EDDIE MONTAÑO MD            SYSTEM ID:  [id number]         Report per radiology    Laboratory:  Labs Ordered and Resulted from Time of ED Arrival to Time of ED Departure   BASIC METABOLIC PANEL - Abnormal       Result Value    Sodium 139      Potassium 4.6      Chloride 103      Carbon Dioxide (CO2) 24      Anion Gap 12      Urea Nitrogen 10.2      Creatinine 0.70      Calcium 9.1      Glucose 150 (*)     GFR Estimate >90     CBC WITH PLATELETS AND DIFFERENTIAL - Abnormal    WBC Count 10.6      RBC Count 4.69      Hemoglobin 14.0      Hematocrit 45.6      MCV 97      MCH 29.9      MCHC 30.7 (*)     RDW 13.6      Platelet Count 379      % Neutrophils 53      % Lymphocytes 35      % Monocytes 7      % Eosinophils 4      % Basophils 1      % Immature Granulocytes 0      NRBCs per 100 WBC 0      Absolute Neutrophils 5.6      Absolute Lymphocytes 3.7      Absolute Monocytes 0.7      Absolute Eosinophils 0.4      Absolute Basophils 0.1      Absolute Immature Granulocytes 0.0      Absolute NRBCs 0.0     TROPONIN T, HIGH SENSITIVITY - Abnormal    Troponin T, High Sensitivity 75 (*)    HEPATIC  FUNCTION PANEL - Abnormal    Protein Total 7.6      Albumin 4.7      Bilirubin Total 0.5      Alkaline Phosphatase 110 (*)     AST 37 (*)     ALT 21      Bilirubin Direct <0.20     TROPONIN T, HIGH SENSITIVITY - Normal    Troponin T, High Sensitivity <6     INFLUENZA A/B & SARS-COV2 PCR MULTIPLEX - Normal    Influenza A PCR Negative      Influenza B PCR Negative      RSV PCR Negative      SARS CoV2 PCR Negative     LIPASE - Normal    Lipase 22     D DIMER QUANTITATIVE - Normal    D-Dimer Quantitative 0.27       Emergency Department Course & Assessments:    Interventions:  Medications   heparin 25,000 units in 0.45% NaCl 250 mL ANTICOAGULANT infusion (1,200 Units/hr Intravenous New Bag 1/2/23 1515)   nitroGLYcerin (NITROSTAT) sublingual tablet 0.4 mg (0.4 mg Sublingual Given 1/2/23 1433)   aspirin (ASA) tablet 325 mg (325 mg Oral Given 1/2/23 1424)   heparin loading dose for LOW INTENSITY TREATMENT * Give BEFORE starting heparin infusion (6,000 Units Intravenous Given 1/2/23 1515)     Consultations/Discussion of Management or Tests:  ED Course as of 01/02/23 1605   Mon Jan 02, 2023   1405 I obtained history and examined the patient as noted above. I explained initial findings with the patient.    1545 I consulted with Dr. Mckeon, hospitalist, regarding the patient's history and presentation here in the emergency department who accepted the patient for admission.   1603 I rechecked the patient and explained findings. I discussed admission with the patient. All questions answered.      Disposition:  The patient was admitted to the hospital under the care of Dr. Mckeon.     Impression & Plan      Medical Decision Making:  Palak Eckert is a 57 year old female who presents for evaluation of anterior chest pain.  On my evaluation she is afebrile, hemodynamically stable.  Initial EKG normal sinus rhythm without evidence of acute ischemic changes.  Initial high-sensitivity troponin is undetectable.  CBC BMP unremarkable.   Chest x-ray is obtained and negative for any evidence of acute thoracic pathology.  D-dimer is negative making PE unlikely..  COVID-19, influenza and RSV PCR negative.  Repeat troponin resulted elevated at 75.  Patient was continuing to have chest pain.  She was treated with sublingual nitroglycerin which resolved her chest pain.  Findings are consistent with NSTEMI.  Patient was treated with aspirin, heparin.  Repeat EKG remained without concerning ischemic changes.  Intra-abdominal causes of her symptoms are also considered including pancreatitis, hepatobiliary process, cholecystitis.  Hepatic panel and lipase are reassuring.  Abdominal exam is benign.  Given the findings patient required mission to the hospital for ongoing monitoring evaluation and treatment.  The case was discussed with the hospitalist and the patient was admitted in stable condition.    Diagnosis:    ICD-10-CM    1. NSTEMI (non-ST elevated myocardial infarction) (H)  I21.4          Scribe Disclosure:  Kinza MONTENEGRO, am serving as a scribe at 1:25 PM on 1/2/2023 to document services personally performed by Cole Ruiz MD based on my observations and the provider's statements to me.     1/2/2023   Cole Ruiz MD Daro, Ryan Clay, MD  01/02/23 [id number]

## 2023-01-02 NOTE — ED TRIAGE NOTES
Mid sternal chest pressure, started at 0630 today. Some SOB and nausea. VSS. ABCs intact. A/Ox4.

## 2023-01-02 NOTE — PHARMACY-ADMISSION MEDICATION HISTORY
Admission medication history interview status for this patient is complete. See [facility] admission navigator for allergy information, prior to admission medications and immunization status.     Medication history interview done, indicate source(s): Patient  Medication history resources (including written lists, pill bottles, clinic record):None    Changes made to PTA medication list:  Added: ibuprofen  Changed: none  Reported as Not Taking: none  Removed: none    Actions taken by pharmacist (provider contacted, etc):None     Additional medication history information:None    Medication reconciliation/reorder completed by provider prior to medication history?  N   (Y/N)     Prior to Admission medications    Medication Sig Last Dose Taking? Auth Provider Long Term End Date   ibuprofen (ADVIL/MOTRIN) 200 MG tablet Take 200 mg by mouth every 4 hours as needed for pain  Yes Unknown, Entered By History     multivitamin w/minerals (THERA-VIT-M) tablet Take 1 tablet by mouth daily Past Month Yes Reported, Patient

## 2023-01-02 NOTE — H&P
"[facility] History and Physical    Palak Eckert MRN# [id number]   Age: 57 year old YOB: 1965     Date of Admission:  1/2/2023    Home clinic: [facility]   Primary care provider: Ml Paz          Assessment and Plan:   Assessment:   .Name is a 57-year-old woman who came to attention today with chest pain that she noticed this when she woke up.  She is not clear whether she was awakened by the chest pain but identified it as potentially \"heart pain\".  She came directly to the emergency department within an hour of onset.    Past medical history is notable for sinus surgery.  On chart review, I identify that the patient that the patient's cholesterol has been significantly elevated in the past though she was not aware of that.  She is never been a smoker.  She was pregnant once with twins and was not diabetic at that time and has never been told she is diabetic subsequently.  There is no family history of early aggressive coronary disease.  She is obese and does not exercise on a regular basis.  She does work as a  at a [employer] and is extremely busy and stressed by the demands of her work.    On presentation to the emergency department, VS: /88, HR 68, RR 18.  Oxygen saturation 100% breathing room air.  T 97  F.  Examination reveals a healthy appearing middle-aged  female in no apparent distress.  When she arrived in the emergency department, she did have chest pain and this apparently responded to nitroglycerin x3.  Labs: Creatinine 0.7 with normal electrolytes.  Alk phos 110, AST 37/ALT 21.  Initial troponin 6 and delta troponin 5-1/2 hours later 75 ng/L.  WBC 10.6, Hgb 14, .  D-dimer 0.27.  COVID PCR along with influenza A B and RSV are negative.  Chest x-ray negative for acute infiltrate.  No evidence for heart failure.  EKG showed a sinus bradycardia with inferior lead ST depression of less than a " millimeter.    Dx:  1.  Abrupt onset CP with nausea, rising troponin consistent with NSTEMI v Takatsubo CMO.  2.  Dyslipidemia.   3.  BMI almost 40.      Plan:   1.  Admit to inpt on tele  2.  Received ASA in ED.  Started heparin drip, metoprolol 25 mg, atorvastatin 40 mg now.   3.  Cards consult.  4.  Echo.  5.  No caffeine, NPO after midnight.             Chief Complaint:   Chest pain     History is obtained from the patient, Emergency room physician and electronic medical record.    Palak Eckert reports that she awakened today with chest discomfort that she described as a pressure sensation initially at the left chest wall but subsequently in the central chest.  She had associated nausea and sweating with this but did not actually vomit.  She did not have significant amount of radiation initially but subsequently developed some radiation to the inside of her left arm.    Although the patient had reported some pain about a week ago, its not clear that this was similar to what occurred today.    Ms. Eckert has never had symptoms similar to what she experienced tonight.  She does not exercise on a regular basis and has been extremely busy with work.  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  Otherwise no major stresses.        Past Medical History:     Past Medical History:   Diagnosis Date     Unspecified sinusitis (chronic)              Past Surgical History:      Past Surgical History:   Procedure Laterality Date     COLONOSCOPY  8/11/2015    Dr. No [facility]     COLONOSCOPY N/A 8/11/2015    Procedure: COLONOSCOPY;  Surgeon: Jake No MD;  Location:  GI     [facility] NONSPECIFIC PROCEDURE  2001    sinus op             Social History:     Social History     Tobacco Use     Smoking status: Never     Smokeless tobacco: Never   Substance Use Topics     Alcohol use: No     Comment: maybe 1 time per year              Family History:     Family History   Problem Relation Age of Onset     Cancer Father         kidney     Respiratory Father         emphysema     Diabetes Father      Colon Cancer No family hx of      Family history most notably negative for early aggressive cardiovascular disease         Allergies:     Allergies   Allergen Reactions     No Known Drug Allergies              Medications:     Medications Prior to Admission   Medication Sig Dispense Refill Last Dose     ibuprofen (ADVIL/MOTRIN) 200 MG tablet Take 200 mg by mouth every 4 hours as needed for pain        multivitamin w/minerals (THERA-VIT-M) tablet Take 1 tablet by mouth daily   Past Month             Review of Systems:   A comprehensive review of systems was performed and found to be negative except as described in this note           Physical Exam:   Vitals were reviewed  Temp: 98.6  F (37  C) Temp src: Oral BP: (!) 149/77 Pulse: 76   Resp: 16 SpO2: 94 % O2 Device: None (Room air)    Constitutional: Awake, alert, cooperative, no apparent distress, and appears stated age.  Eyes: Lids and lashes normal, pupils equal, round and reactive to light, extra ocular muscles intact, sclera clear, conjunctiva normal.  ENT: Normocephalic, without obvious abnormality, atraumatic, oral pharynx with moist mucus membranes, tonsils without erythema or exudates, gums normal and good dentition.  Neck: Supple, symmetrical, trachea midline, no adenopathy, thyroid symmetric, not enlarged and no tenderness, skin normal.  Hematologic / Lymphatic: No cervical lymphadenopathy and no supraclavicular lymphadenopathy.  Back: Symmetric, no curvature, spinous processes are non-tender on palpation, paraspinous muscles are non-tender on palpation, no costal vertebral tenderness.  Lungs: No increased work of breathing, good air exchange, clear to auscultation bilaterally, no crackles or wheezing.  Cardiovascular: Regular rate and rhythm and no murmur noted.  Abdomen: Normal bowel  sounds, soft, non-distended, non-tender, no masses palpated, no hepatosplenomegaly.  Musculoskeletal: No redness, warmth, or swelling of the joints.  Full range of motion noted.    Neurologic: Awake, alert, oriented to name, place and time.  Cranial nerves II-XII are grossly intact.    Neuropsychiatric: Normal affect, mood, orientation, memory and insight.  Skin: No rashes, erythema, pallor, petechia or purpura.          Data:     Results for orders placed or performed during the hospital encounter of 01/02/23 (from the past 24 hour(s))   EKG 12 lead   Result Value Ref Range    Systolic Blood Pressure  mmHg    Diastolic Blood Pressure  mmHg    Ventricular Rate 56 BPM    Atrial Rate 56 BPM    MN Interval 150 ms    QRS Duration 94 ms     ms    QTc 461 ms    P Axis 31 degrees    R AXIS 9 degrees    T Axis -6 degrees    Interpretation ECG       Sinus bradycardia  Nonspecific ST abnormality  Abnormal ECG  No previous ECGs available  Confirmed by - EMERGENCY ROOM, PHYSICIAN ([id number]),  CINTIA MCCULLOUGH ([id number]) on 1/2/2023 12:59:29 PM     CBC + differential    Narrative    The following orders were created for panel order CBC + differential.  Procedure                               Abnormality         Status                     ---------                               -----------         ------                     CBC with platelets and d...[[id number]]  Abnormal            Final result                 Please view results for these tests on the individual orders.   Basic metabolic panel (BMP)   Result Value Ref Range    Sodium 139 136 - 145 mmol/L    Potassium 4.6 3.4 - 5.3 mmol/L    Chloride 103 98 - 107 mmol/L    Carbon Dioxide (CO2) 24 22 - 29 mmol/L    Anion Gap 12 7 - 15 mmol/L    Urea Nitrogen 10.2 6.0 - 20.0 mg/dL    Creatinine 0.70 0.51 - 0.95 mg/dL    Calcium 9.1 8.6 - 10.0 mg/dL    Glucose 150 (H) 70 - 99 mg/dL    GFR Estimate >90 >60 mL/min/1.73m2   Troponin T, High Sensitivity (now)   Result Value Ref  Range    Troponin T, High Sensitivity <6 <=14 ng/L   Extra Tube ([place] Draw)    Narrative    The following orders were created for panel order Extra Tube ([place] Draw).  Procedure                               Abnormality         Status                     ---------                               -----------         ------                     Extra Blue Top Tube[[id number]]                              Final result                 Please view results for these tests on the individual orders.   CBC with platelets and differential   Result Value Ref Range    WBC Count 10.6 4.0 - 11.0 10e3/uL    RBC Count 4.69 3.80 - 5.20 10e6/uL    Hemoglobin 14.0 11.7 - 15.7 g/dL    Hematocrit 45.6 35.0 - 47.0 %    MCV 97 78 - 100 fL    MCH 29.9 26.5 - 33.0 pg    MCHC 30.7 (L) 31.5 - 36.5 g/dL    RDW 13.6 10.0 - 15.0 %    Platelet Count 379 150 - 450 10e3/uL    % Neutrophils 53 %    % Lymphocytes 35 %    % Monocytes 7 %    % Eosinophils 4 %    % Basophils 1 %    % Immature Granulocytes 0 %    NRBCs per 100 WBC 0 <1 /100    Absolute Neutrophils 5.6 1.6 - 8.3 10e3/uL    Absolute Lymphocytes 3.7 0.8 - 5.3 10e3/uL    Absolute Monocytes 0.7 0.0 - 1.3 10e3/uL    Absolute Eosinophils 0.4 0.0 - 0.7 10e3/uL    Absolute Basophils 0.1 0.0 - 0.2 10e3/uL    Absolute Immature Granulocytes 0.0 <=0.4 10e3/uL    Absolute NRBCs 0.0 10e3/uL   Extra Blue Top Tube   Result Value Ref Range    Hold Specimen [facility]    D dimer quantitative   Result Value Ref Range    D-Dimer Quantitative 0.27 0.00 - 0.50 ug/mL FEU    Narrative    This D-dimer assay is intended for use in conjunction with a clinical pretest probability assessment model to exclude pulmonary embolism (PE) and deep venous thrombosis (DVT) in outpatients suspected of PE or DVT. The cut-off value is 0.50 ug/mL FEU.   XR Chest 2 Views    Narrative    CHEST TWO VIEWS  1/2/2023 9:15 AM     HISTORY: Chest pain and shortness of breath.    COMPARISON: Chest x-ray on 2/27/2011      Impression     IMPRESSION: PA and lateral views of the chest were obtained.  Cardiomediastinal silhouette is within normal limits. No suspicious  focal pulmonary opacities. No significant pleural effusion or  pneumothorax.    EDDIE MONTAÑO MD         SYSTEM ID:  [id number]   Symptomatic Influenza A/B & SARS-CoV2 (COVID-19) Virus PCR Multiplex Nasopharyngeal    Specimen: Nasopharyngeal; Swab   Result Value Ref Range    Influenza A PCR Negative Negative    Influenza B PCR Negative Negative    RSV PCR Negative Negative    SARS CoV2 PCR Negative Negative    Narrative    Testing was performed using the Xpert Xpress CoV2/Flu/RSV Assay on the [employer] GeneXpert Instrument. This test should be ordered for the detection of SARS-CoV-2 and influenza viruses in individuals who meet clinical and/or epidemiological criteria. Test performance is unknown in asymptomatic patients. This test is for in vitro diagnostic use under the FDA EUA for laboratories certified under CLIA to perform high or moderate complexity testing. This test has not been FDA cleared or approved. A negative result does not rule out the presence of PCR inhibitors in the specimen or target RNA in concentration below the limit of detection for the assay. If only one viral target is positive but coinfection with multiple targets is suspected, the sample should be re-tested with another FDA cleared, approved, or authorized test, if coinfection would change clinical management. This test was validated by the [facility]. These laboratories are certified under the Clinical Laboratory Improvement Amendments of 1988 (CLIA-88) as qualified to perform high complexity laboratory testing.   Troponin T, High Sensitivity   Result Value Ref Range    Troponin T, High Sensitivity 75 (H) <=14 ng/L   Hepatic function panel   Result Value Ref Range    Protein Total 7.6 6.4 - 8.3 g/dL    Albumin 4.7 3.5 - 5.2 g/dL    Bilirubin Total 0.5 <=1.2 mg/dL    Alkaline Phosphatase  110 (H) 35 - 104 U/L    AST 37 (H) 10 - 35 U/L    ALT 21 10 - 35 U/L    Bilirubin Direct <0.20 0.00 - 0.30 mg/dL   Lipase   Result Value Ref Range    Lipase 22 13 - 60 U/L   EKG 12-lead, tracing only   Result Value Ref Range    Systolic Blood Pressure  mmHg    Diastolic Blood Pressure  mmHg    Ventricular Rate 93 BPM    Atrial Rate 93 BPM    ID Interval 152 ms    QRS Duration 86 ms     ms    QTc 494 ms    P Axis 44 degrees    R AXIS -10 degrees    T Axis 18 degrees    Interpretation ECG       Sinus rhythm  Minimal voltage criteria for LVH, may be normal variant  Nonspecific ST abnormality  Prolonged QT  Abnormal ECG  When compared with ECG of 02-JAN-2023 07:45,  Vent. rate has increased BY  37 BPM     Heparin Unfractionated Anti Xa Level   Result Value Ref Range    Anti Xa Unfractionated Heparin <0.10 For Reference Range, See Comment IU/mL    Narrative    Therapeutic Range: UFH: 0.25-0.50 IU/mL for low intensity dosing,  0.30-0.70 IU/mL for high intensity dosing DVT and PE.  This test is not validated for other direct factor X inhibitors (e.g. rivaroxaban, apixaban, edoxaban, betrixaban, fondaparinux) and should not be used for monitoring of other medications.   Troponin T, High Sensitivity   Result Value Ref Range    Troponin T, High Sensitivity 251 (HH) <=14 ng/L      All cardiac studies reviewed by me.   All imaging studies reviewed by me.      Attestation:  I have reviewed today's vital signs, notes, medications, labs and imaging.  Total time: 40 minutes     Rashel Mckeon MD

## 2023-01-02 NOTE — ED NOTES
[facility]  ED Nurse Handoff Report    Palak Eckert is a 57 year old female   ED Chief complaint: Chest Pain  . ED Diagnosis:   Final diagnoses:   None     Allergies:   Allergies   Allergen Reactions     No Known Drug Allergies        Code Status: Full Code  Activity level - Baseline/Home:  Independent. Activity Level - Current:   Independent. Lift room needed: No. Bariatric: No   Needed: No   Isolation: No. Infection: Not Applicable.     Vital Signs:   Vitals:    01/02/23 1446 01/02/23 1501 01/02/23 1516 01/02/23 1531   BP: 102/65 117/68     Pulse: 87 76 75 67   Resp: 11 17 12 16   Temp:       TempSrc:       SpO2: 96% 95% 97% 96%   Weight:           Cardiac Rhythm:  ,      Pain level:    Patient confused: No. Patient Falls Risk: No.   Elimination Status: Has voided   Patient Report - Initial Complaint: chest pain . Focused Assessment: r/o cardiac reason for chest pain   Tests Performed:   Labs Ordered and Resulted from Time of ED Arrival to Time of ED Departure   BASIC METABOLIC PANEL - Abnormal       Result Value    Sodium 139      Potassium 4.6      Chloride 103      Carbon Dioxide (CO2) 24      Anion Gap 12      Urea Nitrogen 10.2      Creatinine 0.70      Calcium 9.1      Glucose 150 (*)     GFR Estimate >90     CBC WITH PLATELETS AND DIFFERENTIAL - Abnormal    WBC Count 10.6      RBC Count 4.69      Hemoglobin 14.0      Hematocrit 45.6      MCV 97      MCH 29.9      MCHC 30.7 (*)     RDW 13.6      Platelet Count 379      % Neutrophils 53      % Lymphocytes 35      % Monocytes 7      % Eosinophils 4      % Basophils 1      % Immature Granulocytes 0      NRBCs per 100 WBC 0      Absolute Neutrophils 5.6      Absolute Lymphocytes 3.7      Absolute Monocytes 0.7      Absolute Eosinophils 0.4      Absolute Basophils 0.1      Absolute Immature Granulocytes 0.0      Absolute NRBCs 0.0     TROPONIN T, HIGH SENSITIVITY - Abnormal    Troponin T, High Sensitivity 75 (*)    HEPATIC FUNCTION  PANEL - Abnormal    Protein Total 7.6      Albumin 4.7      Bilirubin Total 0.5      Alkaline Phosphatase 110 (*)     AST 37 (*)     ALT 21      Bilirubin Direct <0.20     TROPONIN T, HIGH SENSITIVITY - Normal    Troponin T, High Sensitivity <6     INFLUENZA A/B & SARS-COV2 PCR MULTIPLEX - Normal    Influenza A PCR Negative      Influenza B PCR Negative      RSV PCR Negative      SARS CoV2 PCR Negative     LIPASE - Normal    Lipase 22     D DIMER QUANTITATIVE - Normal    D-Dimer Quantitative 0.27      . Abnormal Results: see above  Treatments provided: pain control ,nitroglycerine x3 with relief of pain .   Family Comments:  at bedside  OBS brochure/video discussed/provided to patient:  Yes  ED Medications:   Medications   heparin 25,000 units in 0.45% NaCl 250 mL ANTICOAGULANT infusion (1,200 Units/hr Intravenous New Bag 1/2/23 9045)   nitroGLYcerin (NITROSTAT) sublingual tablet 0.4 mg (0.4 mg Sublingual Given 1/2/23 1433)   aspirin (ASA) tablet 325 mg (325 mg Oral Given 1/2/23 1424)   heparin loading dose for LOW INTENSITY TREATMENT * Give BEFORE starting heparin infusion (6,000 Units Intravenous Given 1/2/23 1515)     Drips infusing:  Yes  For the majority of the shift, the patient's behavior Green. Interventions performed were labs , chest xray , heparin asa given.    Sepsis treatment initiated: No     Patient tested for COVID 19 prior to admission: YES    ED Nurse Name/Phone Number: Dilma Singh RN,   3:32 PM     RECEIVING UNIT ED HANDOFF REVIEW    Above ED Nurse Handoff Report was reviewed: Yes  Reviewed by: Kaylin Carter RN on January 2, 2023 at 4:38 PM

## 2023-01-03 ENCOUNTER — APPOINTMENT (OUTPATIENT)
Dept: CARDIOLOGY | Facility: CLINIC | Age: 58
End: 2023-01-03
Attending: INTERNAL MEDICINE
Payer: COMMERCIAL

## 2023-01-03 LAB
ACT BLD: 126 SECONDS (ref 74–150)
ANION GAP SERPL CALCULATED.3IONS-SCNC: 13 MMOL/L (ref 7–15)
ATRIAL RATE - MUSE: 93 BPM
BUN SERPL-MCNC: 10.3 MG/DL (ref 6–20)
CALCIUM SERPL-MCNC: 9 MG/DL (ref 8.6–10)
CHLORIDE SERPL-SCNC: 103 MMOL/L (ref 98–107)
CHOLEST SERPL-MCNC: 159 MG/DL
CREAT SERPL-MCNC: 0.62 MG/DL (ref 0.51–0.95)
DEPRECATED HCO3 PLAS-SCNC: 23 MMOL/L (ref 22–29)
DIASTOLIC BLOOD PRESSURE - MUSE: NORMAL MMHG
ERYTHROCYTE [DISTWIDTH] IN BLOOD BY AUTOMATED COUNT: 13.7 % (ref 10–15)
GFR SERPL CREATININE-BSD FRML MDRD: >90 ML/MIN/1.73M2
GLUCOSE SERPL-MCNC: 117 MG/DL (ref 70–99)
HBA1C MFR BLD: 5.8 %
HCT VFR BLD AUTO: 43.6 % (ref 35–47)
HDLC SERPL-MCNC: 44 MG/DL
HGB BLD-MCNC: 14 G/DL (ref 11.7–15.7)
INTERPRETATION ECG - MUSE: NORMAL
LDLC SERPL CALC-MCNC: 89 MG/DL
LVEF ECHO: NORMAL
MCH RBC QN AUTO: 29.9 PG (ref 26.5–33)
MCHC RBC AUTO-ENTMCNC: 32.1 G/DL (ref 31.5–36.5)
MCV RBC AUTO: 93 FL (ref 78–100)
NONHDLC SERPL-MCNC: 115 MG/DL
P AXIS - MUSE: 44 DEGREES
PLATELET # BLD AUTO: 416 10E3/UL (ref 150–450)
POTASSIUM SERPL-SCNC: 4 MMOL/L (ref 3.4–5.3)
PR INTERVAL - MUSE: 152 MS
QRS DURATION - MUSE: 86 MS
QT - MUSE: 398 MS
QTC - MUSE: 494 MS
R AXIS - MUSE: -10 DEGREES
RBC # BLD AUTO: 4.68 10E6/UL (ref 3.8–5.2)
SODIUM SERPL-SCNC: 139 MMOL/L (ref 136–145)
SYSTOLIC BLOOD PRESSURE - MUSE: NORMAL MMHG
T AXIS - MUSE: 18 DEGREES
TRIGL SERPL-MCNC: 132 MG/DL
TROPONIN T SERPL HS-MCNC: 1244 NG/L
TROPONIN T SERPL HS-MCNC: 1431 NG/L
TROPONIN T SERPL HS-MCNC: 1513 NG/L
UFH PPP CHRO-ACNC: 0.78 IU/ML
UFH PPP CHRO-ACNC: <0.1 IU/ML
VENTRICULAR RATE- MUSE: 93 BPM
WBC # BLD AUTO: 15.9 10E3/UL (ref 4–11)

## 2023-01-03 PROCEDURE — 36415 COLL VENOUS BLD VENIPUNCTURE: CPT | Performed by: STUDENT IN AN ORGANIZED HEALTH CARE EDUCATION/TRAINING PROGRAM

## 2023-01-03 PROCEDURE — 93458 L HRT ARTERY/VENTRICLE ANGIO: CPT | Mod: 26 | Performed by: INTERNAL MEDICINE

## 2023-01-03 PROCEDURE — 250N000013 HC RX MED GY IP 250 OP 250 PS 637: Performed by: INTERNAL MEDICINE

## 2023-01-03 PROCEDURE — 83036 HEMOGLOBIN GLYCOSYLATED A1C: CPT | Performed by: INTERNAL MEDICINE

## 2023-01-03 PROCEDURE — 93306 TTE W/DOPPLER COMPLETE: CPT | Mod: 26 | Performed by: INTERNAL MEDICINE

## 2023-01-03 PROCEDURE — 255N000002 HC RX 255 OP 636: Performed by: INTERNAL MEDICINE

## 2023-01-03 PROCEDURE — 258N000003 HC RX IP 258 OP 636: Performed by: INTERNAL MEDICINE

## 2023-01-03 PROCEDURE — 999N000208 ECHOCARDIOGRAM COMPLETE

## 2023-01-03 PROCEDURE — 85520 HEPARIN ASSAY: CPT | Performed by: STUDENT IN AN ORGANIZED HEALTH CARE EDUCATION/TRAINING PROGRAM

## 2023-01-03 PROCEDURE — 85027 COMPLETE CBC AUTOMATED: CPT | Performed by: INTERNAL MEDICINE

## 2023-01-03 PROCEDURE — 84484 ASSAY OF TROPONIN QUANT: CPT | Performed by: INTERNAL MEDICINE

## 2023-01-03 PROCEDURE — B2111ZZ FLUOROSCOPY OF MULTIPLE CORONARY ARTERIES USING LOW OSMOLAR CONTRAST: ICD-10-PCS | Performed by: INTERNAL MEDICINE

## 2023-01-03 PROCEDURE — 36415 COLL VENOUS BLD VENIPUNCTURE: CPT | Performed by: INTERNAL MEDICINE

## 2023-01-03 PROCEDURE — 250N000011 HC RX IP 250 OP 636: Performed by: INTERNAL MEDICINE

## 2023-01-03 PROCEDURE — 80061 LIPID PANEL: CPT | Performed by: INTERNAL MEDICINE

## 2023-01-03 PROCEDURE — 250N000009 HC RX 250: Performed by: INTERNAL MEDICINE

## 2023-01-03 PROCEDURE — 999N000127 HC STATISTIC PERIPHERAL IV START W US GUIDANCE

## 2023-01-03 PROCEDURE — 80048 BASIC METABOLIC PNL TOTAL CA: CPT | Performed by: INTERNAL MEDICINE

## 2023-01-03 PROCEDURE — 272N000001 HC OR GENERAL SUPPLY STERILE: Performed by: INTERNAL MEDICINE

## 2023-01-03 PROCEDURE — 99152 MOD SED SAME PHYS/QHP 5/>YRS: CPT | Performed by: INTERNAL MEDICINE

## 2023-01-03 PROCEDURE — 85347 COAGULATION TIME ACTIVATED: CPT

## 2023-01-03 PROCEDURE — 99153 MOD SED SAME PHYS/QHP EA: CPT | Performed by: INTERNAL MEDICINE

## 2023-01-03 PROCEDURE — 99223 1ST HOSP IP/OBS HIGH 75: CPT | Mod: 25 | Performed by: INTERNAL MEDICINE

## 2023-01-03 PROCEDURE — 4A023N7 MEASUREMENT OF CARDIAC SAMPLING AND PRESSURE, LEFT HEART, PERCUTANEOUS APPROACH: ICD-10-PCS | Performed by: INTERNAL MEDICINE

## 2023-01-03 PROCEDURE — 93010 ELECTROCARDIOGRAM REPORT: CPT | Performed by: INTERNAL MEDICINE

## 2023-01-03 PROCEDURE — 93458 L HRT ARTERY/VENTRICLE ANGIO: CPT | Performed by: INTERNAL MEDICINE

## 2023-01-03 PROCEDURE — 120N000001 HC R&B MED SURG/OB

## 2023-01-03 PROCEDURE — 99233 SBSQ HOSP IP/OBS HIGH 50: CPT | Performed by: STUDENT IN AN ORGANIZED HEALTH CARE EDUCATION/TRAINING PROGRAM

## 2023-01-03 RX ORDER — FENTANYL CITRATE 50 UG/ML
INJECTION, SOLUTION INTRAMUSCULAR; INTRAVENOUS
Status: DISCONTINUED
Start: 2023-01-03 | End: 2023-01-03 | Stop reason: HOSPADM

## 2023-01-03 RX ORDER — SODIUM CHLORIDE 9 MG/ML
75 INJECTION, SOLUTION INTRAVENOUS CONTINUOUS
Status: ACTIVE | OUTPATIENT
Start: 2023-01-03 | End: 2023-01-03

## 2023-01-03 RX ORDER — FENTANYL CITRATE 50 UG/ML
INJECTION, SOLUTION INTRAMUSCULAR; INTRAVENOUS
Status: DISCONTINUED | OUTPATIENT
Start: 2023-01-03 | End: 2023-01-03 | Stop reason: HOSPADM

## 2023-01-03 RX ORDER — CLOPIDOGREL BISULFATE 75 MG/1
75 TABLET ORAL DAILY
Status: DISCONTINUED | OUTPATIENT
Start: 2023-01-04 | End: 2023-01-04 | Stop reason: HOSPADM

## 2023-01-03 RX ORDER — FLUMAZENIL 0.1 MG/ML
0.2 INJECTION, SOLUTION INTRAVENOUS
Status: CANCELLED | OUTPATIENT
Start: 2023-01-03 | End: 2023-01-03

## 2023-01-03 RX ORDER — SODIUM CHLORIDE 9 MG/ML
INJECTION, SOLUTION INTRAVENOUS CONTINUOUS
Status: DISCONTINUED | OUTPATIENT
Start: 2023-01-03 | End: 2023-01-03 | Stop reason: HOSPADM

## 2023-01-03 RX ORDER — ASPIRIN 81 MG/1
243 TABLET, CHEWABLE ORAL ONCE
Status: COMPLETED | OUTPATIENT
Start: 2023-01-03 | End: 2023-01-03

## 2023-01-03 RX ORDER — NITROGLYCERIN 5 MG/ML
VIAL (ML) INTRAVENOUS
Status: DISCONTINUED
Start: 2023-01-03 | End: 2023-01-03 | Stop reason: HOSPADM

## 2023-01-03 RX ORDER — CLOPIDOGREL BISULFATE 75 MG/1
TABLET ORAL
Status: DISCONTINUED | OUTPATIENT
Start: 2023-01-03 | End: 2023-01-03 | Stop reason: HOSPADM

## 2023-01-03 RX ORDER — POTASSIUM CHLORIDE 1500 MG/1
20 TABLET, EXTENDED RELEASE ORAL
Status: DISCONTINUED | OUTPATIENT
Start: 2023-01-03 | End: 2023-01-03 | Stop reason: HOSPADM

## 2023-01-03 RX ORDER — LIDOCAINE 40 MG/G
CREAM TOPICAL
Status: DISCONTINUED | OUTPATIENT
Start: 2023-01-03 | End: 2023-01-03 | Stop reason: HOSPADM

## 2023-01-03 RX ORDER — LIDOCAINE HYDROCHLORIDE 10 MG/ML
INJECTION, SOLUTION EPIDURAL; INFILTRATION; INTRACAUDAL; PERINEURAL
Status: DISCONTINUED
Start: 2023-01-03 | End: 2023-01-03 | Stop reason: HOSPADM

## 2023-01-03 RX ORDER — OXYCODONE HYDROCHLORIDE 10 MG/1
10 TABLET ORAL EVERY 4 HOURS PRN
Status: DISCONTINUED | OUTPATIENT
Start: 2023-01-03 | End: 2023-01-04 | Stop reason: HOSPADM

## 2023-01-03 RX ORDER — IOPAMIDOL 755 MG/ML
INJECTION, SOLUTION INTRAVASCULAR
Status: DISCONTINUED | OUTPATIENT
Start: 2023-01-03 | End: 2023-01-03 | Stop reason: HOSPADM

## 2023-01-03 RX ORDER — ATROPINE SULFATE 0.1 MG/ML
0.5 INJECTION INTRAVENOUS
Status: CANCELLED | OUTPATIENT
Start: 2023-01-03 | End: 2023-01-03

## 2023-01-03 RX ORDER — ASPIRIN 325 MG
325 TABLET ORAL ONCE
Status: COMPLETED | OUTPATIENT
Start: 2023-01-03 | End: 2023-01-03

## 2023-01-03 RX ORDER — ASPIRIN 81 MG/1
81 TABLET ORAL DAILY
Status: DISCONTINUED | OUTPATIENT
Start: 2023-01-03 | End: 2023-01-04 | Stop reason: HOSPADM

## 2023-01-03 RX ORDER — LORAZEPAM 0.5 MG/1
0.5 TABLET ORAL
Status: DISCONTINUED | OUTPATIENT
Start: 2023-01-03 | End: 2023-01-03 | Stop reason: HOSPADM

## 2023-01-03 RX ORDER — ISOSORBIDE MONONITRATE 30 MG/1
30 TABLET, EXTENDED RELEASE ORAL DAILY
Status: DISCONTINUED | OUTPATIENT
Start: 2023-01-03 | End: 2023-01-04 | Stop reason: HOSPADM

## 2023-01-03 RX ORDER — ACETAMINOPHEN 325 MG/1
650 TABLET ORAL EVERY 4 HOURS PRN
Status: DISCONTINUED | OUTPATIENT
Start: 2023-01-03 | End: 2023-01-04 | Stop reason: HOSPADM

## 2023-01-03 RX ORDER — LORAZEPAM 2 MG/ML
0.5 INJECTION INTRAMUSCULAR
Status: DISCONTINUED | OUTPATIENT
Start: 2023-01-03 | End: 2023-01-03 | Stop reason: HOSPADM

## 2023-01-03 RX ORDER — NALOXONE HYDROCHLORIDE 0.4 MG/ML
0.4 INJECTION, SOLUTION INTRAMUSCULAR; INTRAVENOUS; SUBCUTANEOUS
Status: ACTIVE | OUTPATIENT
Start: 2023-01-03 | End: 2023-01-03

## 2023-01-03 RX ORDER — HEPARIN SODIUM 1000 [USP'U]/ML
INJECTION, SOLUTION INTRAVENOUS; SUBCUTANEOUS
Status: DISCONTINUED
Start: 2023-01-03 | End: 2023-01-03 | Stop reason: WASHOUT

## 2023-01-03 RX ORDER — NALOXONE HYDROCHLORIDE 0.4 MG/ML
0.2 INJECTION, SOLUTION INTRAMUSCULAR; INTRAVENOUS; SUBCUTANEOUS
Status: ACTIVE | OUTPATIENT
Start: 2023-01-03 | End: 2023-01-03

## 2023-01-03 RX ORDER — OXYCODONE HYDROCHLORIDE 5 MG/1
5 TABLET ORAL EVERY 4 HOURS PRN
Status: DISCONTINUED | OUTPATIENT
Start: 2023-01-03 | End: 2023-01-04 | Stop reason: HOSPADM

## 2023-01-03 RX ORDER — CLOPIDOGREL 300 MG/1
TABLET, FILM COATED ORAL
Status: DISCONTINUED
Start: 2023-01-03 | End: 2023-01-03 | Stop reason: HOSPADM

## 2023-01-03 RX ORDER — FENTANYL CITRATE 50 UG/ML
25 INJECTION, SOLUTION INTRAMUSCULAR; INTRAVENOUS
Status: CANCELLED | OUTPATIENT
Start: 2023-01-03

## 2023-01-03 RX ADMIN — MIDAZOLAM 1 MG: 1 INJECTION INTRAMUSCULAR; INTRAVENOUS at 13:50

## 2023-01-03 RX ADMIN — ASPIRIN 325 MG ORAL TABLET 325 MG: 325 PILL ORAL at 09:42

## 2023-01-03 RX ADMIN — METOPROLOL TARTRATE 25 MG: 25 TABLET, FILM COATED ORAL at 08:25

## 2023-01-03 RX ADMIN — ISOSORBIDE MONONITRATE 30 MG: 30 TABLET, EXTENDED RELEASE ORAL at 17:16

## 2023-01-03 RX ADMIN — HEPARIN SODIUM 1500 UNITS/HR: 10000 INJECTION, SOLUTION INTRAVENOUS at 03:07

## 2023-01-03 RX ADMIN — METOPROLOL TARTRATE 25 MG: 25 TABLET, FILM COATED ORAL at 19:54

## 2023-01-03 RX ADMIN — SODIUM CHLORIDE: 9 INJECTION, SOLUTION INTRAVENOUS at 12:38

## 2023-01-03 RX ADMIN — MULTIPLE VITAMINS W/ MINERALS TAB 1 TABLET: TAB at 08:26

## 2023-01-03 RX ADMIN — SODIUM CHLORIDE 75 ML/HR: 9 INJECTION, SOLUTION INTRAVENOUS at 15:40

## 2023-01-03 RX ADMIN — HUMAN ALBUMIN MICROSPHERES AND PERFLUTREN 3 ML: 10; .22 INJECTION, SOLUTION INTRAVENOUS at 10:59

## 2023-01-03 RX ADMIN — ATORVASTATIN CALCIUM 40 MG: 40 TABLET, FILM COATED ORAL at 19:54

## 2023-01-03 RX ADMIN — HEPARIN SODIUM 1500 UNITS/HR: 10000 INJECTION, SOLUTION INTRAVENOUS at 01:11

## 2023-01-03 ASSESSMENT — ACTIVITIES OF DAILY LIVING (ADL)
CONCENTRATING,_REMEMBERING_OR_MAKING_DECISIONS_DIFFICULTY: NO
ADLS_ACUITY_SCORE: 24
DOING_ERRANDS_INDEPENDENTLY_DIFFICULTY: NO
EATING/SWALLOWING: OTHER (SEE COMMENTS)
WALKING_OR_CLIMBING_STAIRS_DIFFICULTY: NO
DIFFICULTY_EATING/SWALLOWING: YES
ADLS_ACUITY_SCORE: 35
CHANGE_IN_FUNCTIONAL_STATUS_SINCE_ONSET_OF_CURRENT_ILLNESS/INJURY: NO
TOILETING_ISSUES: NO
ADLS_ACUITY_SCORE: 24
EQUIPMENT_CURRENTLY_USED_AT_HOME: RAISED TOILET SEAT
ADLS_ACUITY_SCORE: 35
WEAR_GLASSES_OR_BLIND: YES
ADLS_ACUITY_SCORE: 35
ADLS_ACUITY_SCORE: 35
ADLS_ACUITY_SCORE: 24
ADLS_ACUITY_SCORE: 35
FALL_HISTORY_WITHIN_LAST_SIX_MONTHS: NO
ADLS_ACUITY_SCORE: 35
ADLS_ACUITY_SCORE: 35
DRESSING/BATHING_DIFFICULTY: NO

## 2023-01-03 NOTE — PROGRESS NOTES
Patient Transfer Information    Patient tolerated transfer: Yes    Patient connected to monitoring equipment on arrival (if yes, what equipment): Yes: Tele     Safety equipment at bedside (if applicable): Yes     Patient connected to wall oxygen on arrival (if applicable): No -Not applicable sats above 98%    Report received from TEDDY Haley.  transporter) physically handed patient off to receiving RN: Yes    I have reviewed the Medications, Allergies, Past Medical and Surgical History, and Social History in the Epic system. I have reviewed pending test results and orders. No further questions at this time.      *See flowsheets for vital signs and focused assessment of admission/transfer*

## 2023-01-03 NOTE — PLAN OF CARE
TEDDY giving report: Tera ESPINAL receiving report:Dandy REY:  Procedure performed:heart cath    B:  Why procedure needed:chest pain  A:  Assessment of area:soft, intact  R:  Recommendations: Follow protocol for bedrest and puncture site    Family updated:Per MD

## 2023-01-03 NOTE — PROGRESS NOTES
Vitals: BP (!) 148/78 (BP Location: Right arm)   Pulse 68   Temp 97.7  F (36.5  C) (Oral)   Resp 16   Wt 100.2 kg (221 lb)   SpO2 95%   BMI 39.70 kg/m      Neuro: A&Ox 4  Resp: Clear, RA  Cardiac: Tele SR, HR 60s. BP slightly elevated 140s. 6 beats of Vtach around 6 am, asymptomatic  GI: WDL  : intermittent nausea, gave peppermint essential oils for aromatherapy  Skin: WDL  Lines/ Drains: PIV infusing Heparin 1500 Units/hr  Activity: SBA/Independent. Patient requested stand by while ambulating in room overnight. Gait is steady.  Nutrition: NPO  Pain: Denies pain. Describes feeling mild chest pressure and anxiety. Pt was encouraged to reposition and rest. Vital signs and tele are stable    Plans: Echo Today

## 2023-01-03 NOTE — PRE-PROCEDURE
GENERAL PRE-PROCEDURE:   Procedure:  Heart catheterization possible intervention  Date/Time:  1/3/2023 1:34 PM    Written consent obtained?: Yes    Risks and benefits: Risks, benefits and alternatives were discussed    Consent given by:  Patient  Patient states understanding of procedure being performed: Yes    Patient's understanding of procedure matches consent: Yes    Procedure consent matches procedure scheduled: Yes    Expected level of sedation:  Moderate  Appropriately NPO:  Yes  ASA Class:  2  Lungs:  Lungs clear with good breath sounds bilaterally  Heart:  Normal heart sounds and rate  History & Physical reviewed:  History and physical reviewed and no updates needed  Statement of review:  I have reviewed the lab findings, diagnostic data, medications, and the plan for sedation  risk benefit indication for cath poss intervention discussed with pt  All questions answered and she wishes to proceed  Discussed poss dapt
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
## 2023-01-03 NOTE — PROGRESS NOTES
Patient Transfer Information    Patient tolerated transfer: Yes    Patient connected to monitoring equipment on arrival (if yes, what equipment): Yes:     Safety equipment at bedside (if applicable): Yes     Patient connected to wall oxygen on arrival (if applicable): No     Belongings: Transferred with patient    Report received from , RN.   (transporter) physically handed patient off to receiving RN: No     I have reviewed the Medications, Allergies, Past Medical and Surgical History, and Social History in the Epic system. I have reviewed pending test results and orders. No further questions at this time.      *See flowsheets for vital signs and focused assessment of admission/transfer*

## 2023-01-03 NOTE — CONSULTS
[facility]    Cardiology Consultation     Palak Eckert MRN#: [id number]   YOB: 1965 Age: 57 year old     Date of Admission:  1/2/2023    Consult Indication:  NSTEMI    Assessment & Plan     # NSTEMI. Currently asymptomatic. Has RFs of obesity, age, dyslipidemia.   # HL    - will start aspirin 81 mg daily  - agree with statin  - agree with metoprolol  - agree with heparin GTT  - TTE pending    - Discussed options for further evaluation and management including conservative medical management with close follow up, non-invasive stress testing, or cardiac catheterization/coronary angiography +/- PCI.  Reviewed the risks and benefits of each option at length.   - Recommend cardiac catheterization/coronary angiography +/- PCI.   - Discussed the risks of cardiac catheterization/angiography +/- PCI that include but are not limited to the risk of stroke, heart attack, death, cardiac injury, emergent intervention such as stenting or bypass, contrast induced allergic reaction, renal dysfunction (including risks of temporary or permanent dialysis), and complications related to sedation and respiratory/pulmonary compromise, vascular complications (including bleeding and transfusion). Patient denies any major active bleeding issues and is willing to take and comply with dual antiplatelet therapy and understands the associated bleeding risks. Patient understands the overall risks of the procedure and wishes to proceed.  Discussed with Pt and  (over phone). They are in agreement.     Please do not hesitate to page with any questions or concerns.     Ehsan Acosta MD, [facility]  Cardiology  January 3, 2023    Voice recognition software utilized.   High complexity     History of Present Illness     Patient is a 57-year-old female with a past medical history significant for morbid obesity, dyslipidemia, who presents for further evaluation management of chest pain.    Patient  presented on 1/2/2023 with several hours of left-sided chest pain, first noticed after she woke up in the morning.  This chest discomfort is worse with physical activity, and associated with nausea and diaphoresis.  She does note some radiation to her left arm.    In the emergency department initial blood pressure was 168/80 8 mmHg.  Initial ECG demonstrated sinus bradycardia at 56 bpm, mild T wave inversions in leads III, aVF.  QTc 461 ms.  Labs notable for potassium 4.6, creatinine 0.7, initial high-sensitivity troponin was negative, however has since increased to 251.  D-dimer negative.  Chest x-ray negative.  No prior cardiac diagnostic studies.  She was admitted to the Internal Medicine service.  She was started on metoprolol, atorvastatin, heparin GTT.  Aspirin was administered in the ED.    FH notable for brother with congenital heart disease (unknown details).     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       Past Medical History   Past Medical History:   Diagnosis Date     Unspecified sinusitis (chronic)        Past Surgical History   Past Surgical History:   Procedure Laterality Date     COLONOSCOPY  8/11/2015    Dr. No [facility]     COLONOSCOPY N/A 8/11/2015    Procedure: COLONOSCOPY;  Surgeon: Jake No MD;  Location:  GI     [facility] NONSPECIFIC PROCEDURE  2001    sinus op       Prior to Admission Medications   Prior to Admission Medications   Prescriptions Last Dose Informant Patient Reported? Taking?   ibuprofen (ADVIL/MOTRIN) 200 MG tablet   Yes Yes   Sig: Take 200 mg by mouth every 4 hours as needed for pain   multivitamin w/minerals (THERA-VIT-M) tablet Past Month  Yes Yes   Sig: Take 1 tablet by mouth daily      Facility-Administered Medications: None     Current Facility-Administered Medications   Medication Dose Route Frequency     atorvastatin  40 mg Oral QPM     metoprolol tartrate  25 mg Oral BID     multivitamin w/minerals  1 tablet Oral Daily     sodium chloride (PF)  3 mL  Intracatheter Q8H     Current Facility-Administered Medications   Medication Last Rate     heparin 1,500 Units/hr (23 0307)     - MEDICATION INSTRUCTIONS -       Allergies   Allergies   Allergen Reactions     No Known Drug Allergies        Social History    reports that she has never smoked. She has never used smokeless tobacco. She reports that she does not drink alcohol and does not use drugs.    Family History   I have reviewed this patient's family history and updated it with pertinent information if needed.  Family History   Problem Relation Age of Onset     Cancer Father         kidney     Respiratory Father         emphysema     Diabetes Father      Colon Cancer No family hx of           Review of Systems   A comprehensive review of system was performed and is negative other than that noted in the HPI or here.     Physical Exam   Vital Signs with Ranges  Temp:  [97.7  F (36.5  C)-99  F (37.2  C)] 99  F (37.2  C)  Pulse:  [58-96] 70  Resp:  [10-32] 16  BP: (102-149)/(63-90) 121/63  SpO2:  [93 %-99 %] 93 %  Wt Readings from Last 4 Encounters:   23 100.2 kg (221 lb)   22 98.4 kg (217 lb)   22 98.8 kg (217 lb 12.8 oz)   21 98.7 kg (217 lb 11.2 oz)     No intake/output data recorded.    Vital signs were personally reviewed:  Temperatures:  Current - Temp: 99  F (37.2  C); Max - Temp  Av.5  F (36.9  C)  Min: 97.7  F (36.5  C)  Max: 99  F (37.2  C)  Respiration range: Resp  Av.1  Min: 10  Max: 32  Pulse range: Pulse  Av.6  Min: 58  Max: 96  Blood pressure range: Systolic (24hrs), Av , Min:102 , Max:149   ; Diastolic (24hrs), Av, Min:63, Max:90    Pulse oximetry range: SpO2  Av.5 %  Min: 93 %  Max: 99 %  No intake or output data in the 24 hours ending 23 0816  221 lbs 0 oz  Body mass index is 39.7 kg/m .   Body surface area is 2.1 meters squared.    Physical Exam:   General/Constitutional: appears stated age, in no apparent distress, appears to be well  nourished  Head: normocephalic, atraumatic  Eyes - No scleral icterus  Neck: supple, trachea midline  Respiratory: clear to auscultation bilaterally  Cardiovascular: JVP normal, regular rate, regular rhythm, normal S1 and S2, no S3, S4, no murmur appreciated, no lower extremity edema  Gastrointestinal: no guarding, non-rigid   Neurologic: awake, alert, moves all extremities  Skin: no jaundice, warm on limited exam  Psychiatric: affect is normal, answers questions appropriately, oriented to self and place    Laboratory tests personally reviewed:   CMP  Recent Labs   Lab 01/03/23  0641 01/02/23  1329 01/02/23  0753     --  139   POTASSIUM 4.0  --  4.6   CHLORIDE 103  --  103   CO2 23  --  24   ANIONGAP 13  --  12   *  --  150*   BUN 10.3  --  10.2   CR 0.62  --  0.70   GFRESTIMATED >90  --  >90   JARRET 9.0  --  9.1   PROTTOTAL  --  7.6  --    ALBUMIN  --  4.7  --    BILITOTAL  --  0.5  --    ALKPHOS  --  110*  --    AST  --  37*  --    ALT  --  21  --      CBC  Recent Labs   Lab 01/03/23  0641 01/02/23  0753   WBC 15.9* 10.6   RBC 4.68 4.69   HGB 14.0 14.0   HCT 43.6 45.6   MCV 93 97   MCH 29.9 29.9   MCHC 32.1 30.7*   RDW 13.7 13.6    379     INRNo lab results found in last 7 days.  No results found for: TROPI, TROPONIN, TROPR, TROPN  Recent Labs   Lab Test 01/17/22  1601 07/22/19  1149 08/17/17  0941 07/21/15  1356   CHOL 177 186   < > 181   HDL 42* 45*   < > 48*   LDL 91 105*   < > 112   TRIG 218* 179*   < > 107   CHOLHDLRATIO  --   --   --  3.8    < > = values in this interval not displayed.     Lab Results   Component Value Date    A1C 5.7 01/17/2022     TSH   Date Value Ref Range Status   08/17/2017 1.49 0.40 - 4.00 mU/L Final     Clinically Significant Risk Factors Present on Admission

## 2023-01-03 NOTE — PROGRESS NOTES
Care Management Discharge Note    Discharge Date: 01/04/2023       Discharge Disposition: Home      Additional Information:  Patient has a [facility] PCP and a SB#2. Patient has a low URR. No needs identified at this time . Please consult care management if needs arise.    CATHERINE Harrington, [facility]  Emergency Room   [phone]-Please contact the SW on the floor in which the patient is staying for any questions or concerns

## 2023-01-03 NOTE — PROGRESS NOTES
Pt 's IV is infiltrated. Heparin infusing. Notified the provider and pharmacist.    RN Flyer paged to start a new IV line. Heparin stop per charge nurse.    Critical troponin  level of 251 notified provider in person.

## 2023-01-03 NOTE — PROGRESS NOTES
BP (!) 149/77 (BP Location: Right arm)   Pulse 76   Temp 98.6  F (37  C) (Oral)   Resp 16   Wt 100.2 kg (221 lb)   SpO2 94%   BMI 39.70 kg/m        Pt is A&Ox4. RA. Tele NSR. In 60's. Up Independent. IV heparin infusing @ 1200. Pt's IV infiltrated changed line and inserted new line Restarted heparin @ 1200 rate. Pt feels nauseated, notified provider in person.Deit low Na, cardiac diet, no caffeine.Pt  was at beside earlier in the shift. Troponin critical 251 notified provider in person. Continue with plan of care.

## 2023-01-04 ENCOUNTER — HOSPITAL ENCOUNTER (INPATIENT)
Facility: CLINIC | Age: 58
LOS: 3 days | Discharge: HOME | End: 2023-01-07
Attending: HOSPITALIST | Admitting: INTERNAL MEDICINE
Payer: COMMERCIAL

## 2023-01-04 VITALS
OXYGEN SATURATION: 95 % | DIASTOLIC BLOOD PRESSURE: 55 MMHG | SYSTOLIC BLOOD PRESSURE: 114 MMHG | BODY MASS INDEX: 39.7 KG/M2 | TEMPERATURE: 98 F | HEART RATE: 89 BPM | RESPIRATION RATE: 24 BRPM | WEIGHT: 221 LBS

## 2023-01-04 DIAGNOSIS — I25.42 CORONARY ARTERY DISSECTION: Primary | ICD-10-CM

## 2023-01-04 DIAGNOSIS — I21.4 NSTEMI (NON-ST ELEVATED MYOCARDIAL INFARCTION) (H): ICD-10-CM

## 2023-01-04 LAB
ATRIAL RATE - MUSE: 86 BPM
DIASTOLIC BLOOD PRESSURE - MUSE: NORMAL MMHG
INTERPRETATION ECG - MUSE: NORMAL
P AXIS - MUSE: 33 DEGREES
PR INTERVAL - MUSE: 132 MS
QRS DURATION - MUSE: 80 MS
QT - MUSE: 380 MS
QTC - MUSE: 454 MS
R AXIS - MUSE: 3 DEGREES
SYSTOLIC BLOOD PRESSURE - MUSE: NORMAL MMHG
T AXIS - MUSE: 67 DEGREES
TROPONIN T SERPL HS-MCNC: 1455 NG/L
VENTRICULAR RATE- MUSE: 86 BPM

## 2023-01-04 PROCEDURE — 120N000001 HC R&B MED SURG/OB

## 2023-01-04 PROCEDURE — 99232 SBSQ HOSP IP/OBS MODERATE 35: CPT | Performed by: INTERNAL MEDICINE

## 2023-01-04 PROCEDURE — 84484 ASSAY OF TROPONIN QUANT: CPT | Performed by: INTERNAL MEDICINE

## 2023-01-04 PROCEDURE — 250N000013 HC RX MED GY IP 250 OP 250 PS 637: Performed by: INTERNAL MEDICINE

## 2023-01-04 PROCEDURE — 99222 1ST HOSP IP/OBS MODERATE 55: CPT | Mod: AI | Performed by: INTERNAL MEDICINE

## 2023-01-04 PROCEDURE — 99233 SBSQ HOSP IP/OBS HIGH 50: CPT | Performed by: INTERNAL MEDICINE

## 2023-01-04 PROCEDURE — 36415 COLL VENOUS BLD VENIPUNCTURE: CPT | Performed by: INTERNAL MEDICINE

## 2023-01-04 RX ORDER — ISOSORBIDE MONONITRATE 30 MG/1
30 TABLET, EXTENDED RELEASE ORAL DAILY
Status: DISCONTINUED | OUTPATIENT
Start: 2023-01-05 | End: 2023-01-06

## 2023-01-04 RX ORDER — ACETAMINOPHEN 325 MG/1
650 TABLET ORAL EVERY 4 HOURS PRN
Qty: 90 TABLET | Refills: 3 | Status: SHIPPED | OUTPATIENT
Start: 2023-01-04 | End: 2023-07-06

## 2023-01-04 RX ORDER — ONDANSETRON 4 MG/1
4 TABLET, ORALLY DISINTEGRATING ORAL EVERY 6 HOURS PRN
Status: DISCONTINUED | OUTPATIENT
Start: 2023-01-04 | End: 2023-01-07 | Stop reason: HOSPADM

## 2023-01-04 RX ORDER — ISOSORBIDE MONONITRATE 30 MG/1
30 TABLET, EXTENDED RELEASE ORAL DAILY
Qty: 30 TABLET | Refills: 0 | Status: ON HOLD | OUTPATIENT
Start: 2023-01-05 | End: 2023-01-07

## 2023-01-04 RX ORDER — MULTIPLE VITAMINS W/ MINERALS TAB 9MG-400MCG
1 TAB ORAL DAILY
Status: DISCONTINUED | OUTPATIENT
Start: 2023-01-05 | End: 2023-01-07 | Stop reason: HOSPADM

## 2023-01-04 RX ORDER — LANOLIN ALCOHOL/MO/W.PET/CERES
3 CREAM (GRAM) TOPICAL
Status: DISCONTINUED | OUTPATIENT
Start: 2023-01-04 | End: 2023-01-07 | Stop reason: HOSPADM

## 2023-01-04 RX ORDER — AMOXICILLIN 250 MG
1 CAPSULE ORAL 2 TIMES DAILY PRN
Status: DISCONTINUED | OUTPATIENT
Start: 2023-01-04 | End: 2023-01-07 | Stop reason: HOSPADM

## 2023-01-04 RX ORDER — ONDANSETRON 2 MG/ML
4 INJECTION INTRAMUSCULAR; INTRAVENOUS EVERY 6 HOURS PRN
Status: DISCONTINUED | OUTPATIENT
Start: 2023-01-04 | End: 2023-01-07 | Stop reason: HOSPADM

## 2023-01-04 RX ORDER — METOPROLOL TARTRATE 25 MG/1
25 TABLET, FILM COATED ORAL 2 TIMES DAILY
Status: DISCONTINUED | OUTPATIENT
Start: 2023-01-05 | End: 2023-01-07 | Stop reason: HOSPADM

## 2023-01-04 RX ORDER — NITROGLYCERIN 0.4 MG/1
0.4 TABLET SUBLINGUAL EVERY 5 MIN PRN
Status: DISCONTINUED | OUTPATIENT
Start: 2023-01-04 | End: 2023-01-07 | Stop reason: HOSPADM

## 2023-01-04 RX ORDER — ASPIRIN 81 MG/1
81 TABLET ORAL DAILY
Status: DISCONTINUED | OUTPATIENT
Start: 2023-01-05 | End: 2023-01-07 | Stop reason: HOSPADM

## 2023-01-04 RX ORDER — METOPROLOL TARTRATE 25 MG/1
25 TABLET, FILM COATED ORAL 2 TIMES DAILY
Qty: 120 TABLET | Refills: 3 | Status: ON HOLD | OUTPATIENT
Start: 2023-01-04 | End: 2023-01-07

## 2023-01-04 RX ORDER — PROCHLORPERAZINE 25 MG
25 SUPPOSITORY, RECTAL RECTAL EVERY 12 HOURS PRN
Status: DISCONTINUED | OUTPATIENT
Start: 2023-01-04 | End: 2023-01-07 | Stop reason: HOSPADM

## 2023-01-04 RX ORDER — POLYETHYLENE GLYCOL 3350 17 G/17G
17 POWDER, FOR SOLUTION ORAL DAILY PRN
Status: DISCONTINUED | OUTPATIENT
Start: 2023-01-04 | End: 2023-01-07 | Stop reason: HOSPADM

## 2023-01-04 RX ORDER — CLOPIDOGREL BISULFATE 75 MG/1
75 TABLET ORAL DAILY
Status: DISCONTINUED | OUTPATIENT
Start: 2023-01-05 | End: 2023-01-07 | Stop reason: HOSPADM

## 2023-01-04 RX ORDER — LIDOCAINE 40 MG/G
CREAM TOPICAL
Status: DISCONTINUED | OUTPATIENT
Start: 2023-01-04 | End: 2023-01-07 | Stop reason: HOSPADM

## 2023-01-04 RX ORDER — AMOXICILLIN 250 MG
2 CAPSULE ORAL 2 TIMES DAILY PRN
Status: DISCONTINUED | OUTPATIENT
Start: 2023-01-04 | End: 2023-01-07 | Stop reason: HOSPADM

## 2023-01-04 RX ORDER — OXYCODONE HYDROCHLORIDE 5 MG/1
5 TABLET ORAL EVERY 4 HOURS PRN
Status: DISCONTINUED | OUTPATIENT
Start: 2023-01-04 | End: 2023-01-07 | Stop reason: HOSPADM

## 2023-01-04 RX ORDER — ATORVASTATIN CALCIUM 40 MG/1
40 TABLET, FILM COATED ORAL EVERY EVENING
Status: DISCONTINUED | OUTPATIENT
Start: 2023-01-05 | End: 2023-01-07 | Stop reason: HOSPADM

## 2023-01-04 RX ORDER — ATORVASTATIN CALCIUM 40 MG/1
40 TABLET, FILM COATED ORAL EVERY EVENING
Qty: 30 TABLET | Refills: 0 | Status: ON HOLD | OUTPATIENT
Start: 2023-01-04 | End: 2023-01-07

## 2023-01-04 RX ORDER — PROCHLORPERAZINE MALEATE 5 MG
10 TABLET ORAL EVERY 6 HOURS PRN
Status: DISCONTINUED | OUTPATIENT
Start: 2023-01-04 | End: 2023-01-07 | Stop reason: HOSPADM

## 2023-01-04 RX ORDER — BISACODYL 10 MG
10 SUPPOSITORY, RECTAL RECTAL DAILY PRN
Status: DISCONTINUED | OUTPATIENT
Start: 2023-01-04 | End: 2023-01-07 | Stop reason: HOSPADM

## 2023-01-04 RX ORDER — CLOPIDOGREL BISULFATE 75 MG/1
75 TABLET ORAL DAILY
Qty: 30 TABLET | Refills: 0 | Status: ON HOLD | OUTPATIENT
Start: 2023-01-05 | End: 2023-01-07

## 2023-01-04 RX ADMIN — ACETAMINOPHEN 650 MG: 325 TABLET, FILM COATED ORAL at 10:03

## 2023-01-04 RX ADMIN — MULTIPLE VITAMINS W/ MINERALS TAB 1 TABLET: TAB at 08:33

## 2023-01-04 RX ADMIN — ASPIRIN 81 MG: 81 TABLET, COATED ORAL at 08:33

## 2023-01-04 RX ADMIN — CLOPIDOGREL BISULFATE 75 MG: 75 TABLET ORAL at 08:33

## 2023-01-04 RX ADMIN — ATORVASTATIN CALCIUM 40 MG: 40 TABLET, FILM COATED ORAL at 19:58

## 2023-01-04 RX ADMIN — ISOSORBIDE MONONITRATE 30 MG: 30 TABLET, EXTENDED RELEASE ORAL at 08:33

## 2023-01-04 RX ADMIN — METOPROLOL TARTRATE 25 MG: 25 TABLET, FILM COATED ORAL at 08:33

## 2023-01-04 RX ADMIN — METOPROLOL TARTRATE 25 MG: 25 TABLET, FILM COATED ORAL at 19:57

## 2023-01-04 ASSESSMENT — ACTIVITIES OF DAILY LIVING (ADL)
ADLS_ACUITY_SCORE: 24

## 2023-01-04 NOTE — PROGRESS NOTES
Inpatient Cardiology Consultation Progress Note:    Palak Eckert MRN#: [id number]   YOB: 1965 Age: 57 year old     Date of Admission: 1/2/2023  Consult indication: NSTEMI         Assessment and Plan:     # NSTEMI 2/2 SCAD, cardiac cath 1/3/2023 demonstrated occluded LCx, troponin 1200 prior to cath and post cath peak 1500, no further chest pain    - Coronary angiogram demonstrated SCAD lesion involving left circumflex, per discussion with my partner Dr. Schmitz, PCI of this lesion would be high risk, and so conservative medical management was recommended.  Patient was started on clopidogrel, atorvastatin, Imdur.  Continues on aspirin, metoprolol.  Per guidelines, will need monitoring inpatient for approximately 3 days pending clinical course.  Given involvement of the ostial left circumflex, concern would be for retrograde extension into the left main, as such agree would be safer to have her monitored at [facility]  - LVEDP elevated on cardiac catheterization yesterday, though denies symptoms concerning for fluid overload, euvolemic on exam, will continue to monitor  - Will need FMD work-up in the outpatient setting  - Discussed the diagnosis of SCAD, reviewed activity/weight lifting restrictions, will need to follow-up with Cardiac rehab on discharge, recommend follow up with my colleague Dr. Tee in Vascular cardiology clinic    Thank you for allowing our team to participate in the care of Palak Eckert.  Please do not hesitate to page me with any questions or concerns.     Ehsan Acosta MD, [facility]  Cardiology  January 4, 2023    Voice recognition software utilized.          Interval Events:     - no acute events overnight  - Pt denies any chest pain, chest discomfort, dyspnea  - no dizziness/lightheadedness  - no bleeding events  - telemetry reviewed, no significant abnormalities  - interval labs and studies reviewed   - interval progress notes reviewed  - Pt updated  on clinical course, plan for the day         Medications reviewed:     Current medications:  Current Facility-Administered Medications Ordered in Epic   Medication Dose Route Frequency Last Rate Last Admin     acetaminophen (TYLENOL) tablet 650 mg  650 mg Oral Q4H PRN         aspirin EC tablet 81 mg  81 mg Oral Daily   81 mg at 23     atorvastatin (LIPITOR) tablet 40 mg  40 mg Oral QPM   40 mg at 23     clopidogrel (PLAVIX) tablet 75 mg  75 mg Oral Daily   75 mg at 23     HOLD:  Metformin and metformin containing medications if patient received IV contrast with acute kidney injury or severe chronic kidney disease (stage IV or stage V; i.e., eGFR less than 30)   Does not apply HOLD         isosorbide mononitrate (IMDUR) 24 hr tablet 30 mg  30 mg Oral Daily   30 mg at 23     lidocaine (LMX4) cream   Topical Q1H PRN         lidocaine 1 % 0.1-1 mL  0.1-1 mL Other Q1H PRN         metoprolol tartrate (LOPRESSOR) tablet 25 mg  25 mg Oral BID   25 mg at 23     multivitamin w/minerals (THERA-VIT-M) tablet 1 tablet  1 tablet Oral Daily   1 tablet at 23     oxyCODONE (ROXICODONE) tablet 5 mg  5 mg Oral Q4H PRN        Or     oxyCODONE IR (ROXICODONE) tablet 10 mg  10 mg Oral Q4H PRN         Patient is already receiving anticoagulation with heparin, enoxaparin (LOVENOX), warfarin (COUMADIN)  or other anticoagulant medication   Does not apply Continuous PRN         sodium chloride (PF) 0.9% PF flush 3 mL  3 mL Intracatheter Q8H   3 mL at 23 0124     sodium chloride (PF) 0.9% PF flush 3 mL  3 mL Intracatheter q1 min prn   3 mL at 23 1946     No current [facility]-ordered outpatient medications on file.             Physical Exam:   Vital signs were reviewed:  Temperatures:  Current - Temp: 98.6  F (37  C); Max - Temp  Av.6  F (37  C)  Min: 98  F (36.7  C)  Max: 99.1  F (37.3  C)  Respiration range: Resp  Av.5  Min: 16  Max: 19  Pulse range: Pulse   Av.7  Min: 66  Max: 97  Blood pressure range: Systolic (24hrs), Av , Min:94 , Max:146   ; Diastolic (24hrs), Av, Min:40, Max:81    Pulse oximetry range: SpO2  Av.3 %  Min: 92 %  Max: 100 %    Intake/Output Summary (Last 24 hours) at 2023 0934  Last data filed at 1/3/2023 1948  Gross per 24 hour   Intake 310 ml   Output --   Net 310 ml     221 lbs 0 oz  Body mass index is 39.7 kg/m .   Body surface area is 2.1 meters squared.    General/Constitutional: appears stated age, in no apparent distress, appears to be well nourished  Head: normocephalic, atraumatic  Eyes - No scleral icterus  Neck: supple, trachea midline  Respiratory: clear to auscultation bilaterally, no wheezes, no rales, no increased work of breathing  Cardiovascular: JVP normal, regular rate, regular rhythm, normal S1 and S2, no S3, S4, no murmur appreciated, no lower extremity edema  Gastrointestinal: no guarding, non-rigid   Neurologic: awake, alert, moves all extremities  Skin: no jaundice, warm on limited exam  Psychiatric: affect is normal, answers questions appropriately, oriented to self and place         Selected laboratory tests:   Laboratory test results personally reviewed:   CMP  Recent Labs   Lab 23  0641 23  1329 23  0753     --  139   POTASSIUM 4.0  --  4.6   CHLORIDE 103  --  103   CO2 23  --  24   ANIONGAP 13  --  12   *  --  150*   BUN 10.3  --  10.2   CR 0.62  --  0.70   GFRESTIMATED >90  --  >90   JARRET 9.0  --  9.1   PROTTOTAL  --  7.6  --    ALBUMIN  --  4.7  --    BILITOTAL  --  0.5  --    ALKPHOS  --  110*  --    AST  --  37*  --    ALT  --  21  --      CBC  Recent Labs   Lab 23  0641 23  0753   WBC 15.9* 10.6   RBC 4.68 4.69   HGB 14.0 14.0   HCT 43.6 45.6   MCV 93 97   MCH 29.9 29.9   MCHC 32.1 30.7*   RDW 13.7 13.6    379     INRNo lab results found in last 7 days.  No results found for: TROPI, TROPONIN, TROPR, TROPN  Recent Labs   Lab Test 23  0641  01/17/22  1601 08/17/17  0941 07/21/15  1356   CHOL 159 177   < > 181   HDL 44* 42*   < > 48*   LDL 89 91   < > 112   TRIG 132 218*   < > 107   CHOLHDLRATIO  --   --   --  3.8    < > = values in this interval not displayed.     Lab Results   Component Value Date    A1C 5.8 01/03/2023    A1C 5.7 01/17/2022     TSH   Date Value Ref Range Status   08/17/2017 1.49 0.40 - 4.00 mU/L Final

## 2023-01-04 NOTE — PLAN OF CARE
Goal Outcome Evaluation:      Plan of Care Reviewed With: patient, spouse    Overall Patient Progress: improvingOverall Patient Progress: improving    Pt A&Ox4 post angio with femoral approach.  VSS.  PIV in right hand and right arm SL.  Pt denies pain, chest pain, and Pressure.  Pt spouse in room.  Echo completed EF 55%.  CMS intact and femoral site is WDL.  Pt to remain flat 2 hours post procedure and is compliant.  Heparin drip stopped. Continue monitoring.

## 2023-01-04 NOTE — PROGRESS NOTES
Vitals:/59 (BP Location: Right arm)   Pulse 80   Temp 98.9  F (37.2  C) (Oral)   Resp 16   Wt 100.2 kg (221 lb)   SpO2 94%   BMI 39.70 kg/m       Neuro: A&Ox 4  Resp: Clear, RA  Cardiac: Tele SR  GI: WDL  :WDL  Skin: Femoral angio site R, CDI no signs of bleeding  Lines/ Drains: PIV x 2 SL  Activity: SBA.Gait is steady.  Nutrition: Cardiac  Pain: Denies pain and chest pressure    Plan:  Pt pending transfer to [facility] for monitoring

## 2023-01-04 NOTE — LETTER
[facility]  [address] [place] [address]  [phone]          January 7, 2023    RE:  Palak Eckert                                                                                                                                                       [address]  [facility] [address]            To whom it may concern:    Palak Eckert was hospitalized from 1/2/2023-1/7/2023 for management of an acute cardiac condition.  She was evaluated by cardiology during her stay.  At discharge, it was advised that she avoid the emotional stress of work for a period of 4 to 6 weeks while she continues to recover.  She will follow up with her cardiologist in the coming weeks who will further discern when she will be able to return to work and if any additional restrictions are indicated.         Sincerely,          Ekta Treviño,   Internal Medicine - Hospitalist  [facility]  1/7/2023

## 2023-01-04 NOTE — PLAN OF CARE
Goal Outcome Evaluation:  Vitals:/63 (BP Location: Right arm)   Pulse 74   Temp 98.6  F (37  C) (Oral)   Resp 18   Wt 100.2 kg (221 lb)   SpO2 94%   BMI 39.70 kg/m      Pain: denies except for temporary slight headache which resolved with tylenol.   Neuro: A&O   Cardiac/Tele: SR   LDAs: PIV saline locked.   Diet: low fat low Na  Activity: ind in room   Plan: transfer to [place] as soon as bed available.

## 2023-01-04 NOTE — PLAN OF CARE
Goal Outcome Evaluation:      Plan of Care Reviewed With: patient, spouse    Overall Patient Progress: improvingOverall Patient Progress: improving    Blood pressure 120/58, pulse 97, temperature 98  F (36.7  C), temperature source Oral, resp. rate 16, weight 100.2 kg (221 lb), SpO2 98 %, not currently breastfeeding.    VSS.  Pt denies cp, SOB, and Nausea.  A&Ox4 on RA.  Angio performed with a femoral approach with no interventions.  Site WDL.  Pt pending transfer to [facility] for monitoring.  Pt voiding spontaneously.  Cardiac diet.  PIV in right hand and left arm SL.  Continue monitoring according POC.

## 2023-01-04 NOTE — ACP (ADVANCE CARE PLANNING)
SPIRITUAL HEALTH SERVICES Progress Note   RH Advance Directive Education    Responded to a consult order for Advance Care Planning (ACP) education for Palak Eckert.      Provided ACP education and materials.  Palak reported that she plans to name her spouse Goran as her healthcare agent and her brother Leonardo Ricardo as her alternate.  She endorsed completing a healthcare directive in conversation with her spouse.    Oriented pt to [facility].    This author and other chaplains remain available per pt's request.    Cal Hicks M.Div., [facility]  Staff     [facility] routine referrals *[phone]  [facility] available 24/7 for emergent requests/referrals, either by paging the on-call  or by entering an ASAP/STAT consult in Epic (this will also page the on-call ).
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
## 2023-01-04 NOTE — DISCHARGE SUMMARY
[facility]  Hospitalist Discharge Summary      Date of Admission:  1/2/2023  Date of Discharge:  1/4/2023  Discharging Provider: Alayna Red MD  Discharge Service: Hospitalist Service    Discharge Diagnoses     Non-ST segment elevation MI  coronary artery dissection  Anginal chest pain    Follow-ups Needed After Discharge   Monitoring for medication adverse effect    Unresulted Labs Ordered in the Past 30 Days of this Admission     No orders found from 12/3/2022 to 1/3/2023.        Discharge Disposition   Discharged to home  Condition at discharge: Stable      Hospital Course   57-year-old female admitted with chest pain and non-ST segment elevation MI.  She was on heparin.  Echo was done.  EF 55%.  Left heart catheterization performed showed findings suspicious for coronary artery dissection.  She is currently on aspirin, Plavix, statin and beta-blocker as well as Imdur.  No any chest pain or dyspnea complaints.  Patient transferred to [facility] for further cardiac consultations and management.    Consultations This Hospital Stay   PHARMACY IP CONSULT  PHARMACY IP CONSULT  CARDIOLOGY IP CONSULT  PHARMACY IP CONSULT  PHARMACY IP CONSULT  ADVANCE DIRECTIVE IP CONSULT  SMOKING CESSATION PROGRAM IP CONSULT    Code Status   Full Code    Time Spent on this Encounter   I, Alayna eRd MD, personally saw the patient today and spent greater than 30 minutes discharging this patient.       Alayna Red MD  [facility] MEDICAL SURGICAL  [address] [place] [address]  Phone: [phone]  Fax: [phone]  ______________________________________________________________________    Physical Exam   Vital Signs: Temp: 98  F (36.7  C) Temp src: Oral BP: 96/53 Pulse: 85   Resp: 24 SpO2: 95 % O2 Device: None (Room air)    Weight: 221 lbs 0 oz  General Appearance: Comfortable, lying in bed in no distress.  Respiratory: Lungs clear to auscultation no wheezes  Cardiovascular:  S1-S2 is normal no murmur or gallop rhythm  GI: Soft and nondistended bowel sounds active  Skin: No rashes no bleeding or ecchymosis in the groin  Other: Awake alert oriented.  Normal affect.       Primary Care Physician   Ml Paz    Discharge Orders   No discharge procedures on file.    Significant Results and Procedures   Most Recent 3 CBC's:Recent Labs   Lab Test 01/03/23  0641 01/02/23  0753 12/20/21  1356   WBC 15.9* 10.6  --    HGB 14.0 14.0 14.4   MCV 93 97  --     379  --      Most Recent 3 BMP's:Recent Labs   Lab Test 01/03/23  0641 01/02/23  0753 12/20/21  1356    139 139   POTASSIUM 4.0 4.6 3.8   CHLORIDE 103 103 107   CO2 23 24 28   BUN 10.3 10.2 14   CR 0.62 0.70 0.65   ANIONGAP 13 12 4   JARRET 9.0 9.1 9.0   * 150* 103*     Most Recent 2 LFT's:Recent Labs   Lab Test 01/02/23  1329   AST 37*   ALT 21   ALKPHOS 110*   BILITOTAL 0.5     Most Recent 3 INR's:No lab results found.  Most Recent 3 Troponin's:No lab results found.  Most Recent Cholesterol Panel:Recent Labs   Lab Test 01/03/23  0641   CHOL 159   LDL 89   HDL 44*   TRIG 132     Most Recent TSH and T4:Recent Labs   Lab Test 08/17/17  0941   TSH 1.49   ,   Results for orders placed or performed during the hospital encounter of 01/02/23   XR Chest 2 Views    Narrative    CHEST TWO VIEWS  1/2/2023 9:15 AM     HISTORY: Chest pain and shortness of breath.    COMPARISON: Chest x-ray on 2/27/2011      Impression    IMPRESSION: PA and lateral views of the chest were obtained.  Cardiomediastinal silhouette is within normal limits. No suspicious  focal pulmonary opacities. No significant pleural effusion or  pneumothorax.    EDDIE MONTAÑO MD         SYSTEM ID:  [id number]   Echocardiogram Complete     Value    LVEF  55-60%    Narrative    [id number]  [id number]  [id number]  [id number]^PAR^ANDREA^R     [facility]  Echocardiography Laboratory  [address] [place] [address]     Name: ADDIS GUADARRAMA  MRN:  [id number]  : 1965  Study Date: 2023 10:27 AM  Age: 57 yrs  Gender: Female  Patient Location: [facility]  Reason For Study: Syncope  Ordering Physician: ANDREA CID  Performed By: Nathalie Siegel     BSA: 2.1 m2  Height: 67 in  Weight: 221 lb  BP: 121/63 mmHg  ______________________________________________________________________________  Procedure  Complete Portable Echo Adult. Optison (NDC #9755-1726) given intravenously.  ______________________________________________________________________________  Interpretation Summary     The left atrium is mildly dilated.  The visual ejection fraction is 55-60%.  There is mild (1+) mitral regurgitation.  The ascending aorta is Mildly dilated.  ______________________________________________________________________________  Left Ventricle  The left ventricle is normal in structure, function and size. Left ventricular  diastolic function is normal. The visual ejection fraction is 55-60%.     Right Ventricle  The right ventricle is normal in structure, function and size.     Atria  The left atrium is mildly dilated. Right atrial size is normal.     Mitral Valve  The mitral valve leaflets are mildly thickened. There is mild (1+) mitral  regurgitation.     Tricuspid Valve  Normal tricuspid valve. There is mild (1+) tricuspid regurgitation. Mild (35-  45mmHg) pulmonary hypertension is present.     Aortic Valve  The aortic valve is normal in structure and function.     Pulmonic Valve  Normal pulmonic valve. There is trace to mild pulmonic valvular regurgitation.     Vessels  Borderline aortic root dilatation. The ascending aorta is Mildly dilated. The  inferior vena cava is normal.     Pericardium  There is no pericardial effusion.     Rhythm  Sinus rhythm was noted.  ______________________________________________________________________________  MMode/2D Measurements & Calculations  IVSd: 0.91 cm  LVIDd: 4.7 cm  LVIDs: 3.5 cm  LVPWd: 0.75 cm  FS: 25.8 %  LV mass(C)d:  128.5 grams  LV mass(C)dI: 60.9 grams/m2  Ao root diam: 3.8 cm  LVOT diam: 2.3 cm  LVOT area: 4.0 cm2  LA Volume (BP): 75.8 ml  LA Volume Index (BP): 35.9 ml/m2  RWT: 0.32     Doppler Measurements & Calculations  MV E max joselyn: 80.9 cm/sec  MV A max joselyn: 41.5 cm/sec  MV E/A: 1.9  MV dec slope: 478.5 cm/sec2  MV dec time: 0.17 sec  MR ERO: 0.06 cm2  MR volume: 8.4 ml  PA acc time: 0.13 sec  TR max joselyn: 232.4 cm/sec  TR max P.6 mmHg  E/E' av.2  Lateral E/e': 6.8  Medial E/e': 7.6     ______________________________________________________________________________  Report approved by: Carroll Jane 2023 11:43 AM         Cardiac Catheterization    Narrative      Left ventricular filling pressures are moderately elevated.    Prox Cx lesion is 100% stenosed.     1, Lcx occluded near origin likely at bifurcation with faint collaterals.   This is c/w SCAD. (LMA, LAD, RCA normal)  2. Echo reports normal LV EF and normal wall motion. LVEDP elevated 27mmHg  REC- avoid intervention if possible, add plavix .CTA or recath in a few   weeks. Also recommend CT arteries-carotid, iliac, renal, femorals etc to   assess for associated FMD (fibromuscular dysplasia)           Discharge Medications   Current Discharge Medication List      START taking these medications    Details   acetaminophen (TYLENOL) 325 MG tablet Take 2 tablets (650 mg) by mouth every 4 hours as needed for mild pain or headaches  Qty: 90 tablet, Refills: 3    Associated Diagnoses: NSTEMI (non-ST elevated myocardial infarction) (H)      aspirin (ASA) 81 MG EC tablet Take 1 tablet (81 mg) by mouth daily for 30 days  Qty: 30 tablet, Refills: 3    Associated Diagnoses: NSTEMI (non-ST elevated myocardial infarction) (H)      atorvastatin (LIPITOR) 40 MG tablet Take 1 tablet (40 mg) by mouth every evening for 30 days  Qty: 30 tablet, Refills: 0    Associated Diagnoses: NSTEMI (non-ST elevated myocardial infarction) (H)      clopidogrel (PLAVIX) 75 MG tablet  Take 1 tablet (75 mg) by mouth daily for 30 days  Qty: 30 tablet, Refills: 0    Associated Diagnoses: NSTEMI (non-ST elevated myocardial infarction) (H)      isosorbide mononitrate (IMDUR) 30 MG 24 hr tablet Take 1 tablet (30 mg) by mouth daily for 30 days  Qty: 30 tablet, Refills: 0    Associated Diagnoses: NSTEMI (non-ST elevated myocardial infarction) (H)      metoprolol tartrate (LOPRESSOR) 25 MG tablet Take 1 tablet (25 mg) by mouth 2 times daily for 60 days  Qty: 120 tablet, Refills: 3    Associated Diagnoses: NSTEMI (non-ST elevated myocardial infarction) (H)         CONTINUE these medications which have NOT CHANGED    Details   multivitamin w/minerals (THERA-VIT-M) tablet Take 1 tablet by mouth daily         STOP taking these medications       ibuprofen (ADVIL/MOTRIN) 200 MG tablet Comments:   Reason for Stopping:             Allergies   Allergies   Allergen Reactions     No Known Drug Allergies

## 2023-01-05 ENCOUNTER — APPOINTMENT (OUTPATIENT)
Dept: OCCUPATIONAL THERAPY | Facility: CLINIC | Age: 58
End: 2023-01-05
Attending: HOSPITALIST
Payer: COMMERCIAL

## 2023-01-05 ENCOUNTER — APPOINTMENT (OUTPATIENT)
Dept: OCCUPATIONAL THERAPY | Facility: CLINIC | Age: 58
End: 2023-01-05
Attending: INTERNAL MEDICINE
Payer: COMMERCIAL

## 2023-01-05 LAB
ALBUMIN SERPL-MCNC: 3.2 G/DL (ref 3.4–5)
ALP SERPL-CCNC: 84 U/L (ref 40–150)
ALT SERPL W P-5'-P-CCNC: 25 U/L (ref 0–50)
ANION GAP SERPL CALCULATED.3IONS-SCNC: 8 MMOL/L (ref 3–14)
AST SERPL W P-5'-P-CCNC: 47 U/L (ref 0–45)
ATRIAL RATE - MUSE: 82 BPM
BILIRUB SERPL-MCNC: 1.5 MG/DL (ref 0.2–1.3)
BUN SERPL-MCNC: 13 MG/DL (ref 7–30)
CALCIUM SERPL-MCNC: 8.8 MG/DL (ref 8.5–10.1)
CHLORIDE BLD-SCNC: 106 MMOL/L (ref 94–109)
CO2 SERPL-SCNC: 26 MMOL/L (ref 20–32)
CREAT SERPL-MCNC: 0.61 MG/DL (ref 0.52–1.04)
DIASTOLIC BLOOD PRESSURE - MUSE: NORMAL MMHG
ERYTHROCYTE [DISTWIDTH] IN BLOOD BY AUTOMATED COUNT: 13.6 % (ref 10–15)
GFR SERPL CREATININE-BSD FRML MDRD: >90 ML/MIN/1.73M2
GLUCOSE BLD-MCNC: 99 MG/DL (ref 70–99)
HCT VFR BLD AUTO: 38 % (ref 35–47)
HGB BLD-MCNC: 12.4 G/DL (ref 11.7–15.7)
INTERPRETATION ECG - MUSE: NORMAL
MAGNESIUM SERPL-MCNC: 2.4 MG/DL (ref 1.6–2.3)
MCH RBC QN AUTO: 30.4 PG (ref 26.5–33)
MCHC RBC AUTO-ENTMCNC: 32.6 G/DL (ref 31.5–36.5)
MCV RBC AUTO: 93 FL (ref 78–100)
P AXIS - MUSE: 55 DEGREES
PLATELET # BLD AUTO: 325 10E3/UL (ref 150–450)
POTASSIUM BLD-SCNC: 3.6 MMOL/L (ref 3.4–5.3)
PR INTERVAL - MUSE: 144 MS
PROT SERPL-MCNC: 6.8 G/DL (ref 6.8–8.8)
QRS DURATION - MUSE: 84 MS
QT - MUSE: 398 MS
QTC - MUSE: 464 MS
R AXIS - MUSE: 10 DEGREES
RBC # BLD AUTO: 4.08 10E6/UL (ref 3.8–5.2)
SODIUM SERPL-SCNC: 140 MMOL/L (ref 133–144)
SYSTOLIC BLOOD PRESSURE - MUSE: NORMAL MMHG
T AXIS - MUSE: 52 DEGREES
VENTRICULAR RATE- MUSE: 82 BPM
WBC # BLD AUTO: 11.2 10E3/UL (ref 4–11)

## 2023-01-05 PROCEDURE — 93010 ELECTROCARDIOGRAM REPORT: CPT | Performed by: INTERNAL MEDICINE

## 2023-01-05 PROCEDURE — 250N000013 HC RX MED GY IP 250 OP 250 PS 637: Performed by: INTERNAL MEDICINE

## 2023-01-05 PROCEDURE — 97530 THERAPEUTIC ACTIVITIES: CPT | Mod: GO

## 2023-01-05 PROCEDURE — 36415 COLL VENOUS BLD VENIPUNCTURE: CPT | Performed by: INTERNAL MEDICINE

## 2023-01-05 PROCEDURE — 97110 THERAPEUTIC EXERCISES: CPT | Mod: GO

## 2023-01-05 PROCEDURE — 120N000001 HC R&B MED SURG/OB

## 2023-01-05 PROCEDURE — 93005 ELECTROCARDIOGRAM TRACING: CPT

## 2023-01-05 PROCEDURE — 85027 COMPLETE CBC AUTOMATED: CPT | Performed by: INTERNAL MEDICINE

## 2023-01-05 PROCEDURE — 99232 SBSQ HOSP IP/OBS MODERATE 35: CPT | Performed by: INTERNAL MEDICINE

## 2023-01-05 PROCEDURE — 97165 OT EVAL LOW COMPLEX 30 MIN: CPT | Mod: GO

## 2023-01-05 PROCEDURE — 83735 ASSAY OF MAGNESIUM: CPT | Performed by: INTERNAL MEDICINE

## 2023-01-05 PROCEDURE — 80053 COMPREHEN METABOLIC PANEL: CPT | Performed by: INTERNAL MEDICINE

## 2023-01-05 RX ORDER — NALOXONE HYDROCHLORIDE 0.4 MG/ML
0.4 INJECTION, SOLUTION INTRAMUSCULAR; INTRAVENOUS; SUBCUTANEOUS
Status: DISCONTINUED | OUTPATIENT
Start: 2023-01-05 | End: 2023-01-07 | Stop reason: HOSPADM

## 2023-01-05 RX ORDER — ACETAMINOPHEN 500 MG
1000 TABLET ORAL EVERY 6 HOURS PRN
Status: DISCONTINUED | OUTPATIENT
Start: 2023-01-05 | End: 2023-01-07 | Stop reason: HOSPADM

## 2023-01-05 RX ORDER — NALOXONE HYDROCHLORIDE 0.4 MG/ML
0.2 INJECTION, SOLUTION INTRAMUSCULAR; INTRAVENOUS; SUBCUTANEOUS
Status: DISCONTINUED | OUTPATIENT
Start: 2023-01-05 | End: 2023-01-07 | Stop reason: HOSPADM

## 2023-01-05 RX ADMIN — ASPIRIN 81 MG: 81 TABLET, COATED ORAL at 10:02

## 2023-01-05 RX ADMIN — ISOSORBIDE MONONITRATE 30 MG: 30 TABLET, EXTENDED RELEASE ORAL at 10:02

## 2023-01-05 RX ADMIN — CLOPIDOGREL BISULFATE 75 MG: 75 TABLET ORAL at 10:02

## 2023-01-05 RX ADMIN — MULTIPLE VITAMINS W/ MINERALS TAB 1 TABLET: TAB at 10:03

## 2023-01-05 RX ADMIN — METOPROLOL TARTRATE 25 MG: 25 TABLET, FILM COATED ORAL at 10:02

## 2023-01-05 RX ADMIN — METOPROLOL TARTRATE 25 MG: 25 TABLET, FILM COATED ORAL at 20:06

## 2023-01-05 RX ADMIN — ATORVASTATIN CALCIUM 40 MG: 40 TABLET, FILM COATED ORAL at 20:06

## 2023-01-05 RX ADMIN — ACETAMINOPHEN 1000 MG: 500 TABLET ORAL at 18:06

## 2023-01-05 ASSESSMENT — ACTIVITIES OF DAILY LIVING (ADL)
ADLS_ACUITY_SCORE: 35
PREVIOUS_RESPONSIBILITIES: MEAL PREP;HOUSEKEEPING;LAUNDRY;SHOPPING;MEDICATION MANAGEMENT;FINANCES;DRIVING;WORK
ADLS_ACUITY_SCORE: 35

## 2023-01-05 NOTE — PLAN OF CARE
Pt arrived as a direct admit from [facility] at approximately 2350. Pt is A&Ox4, VSS on RA ex. hypertension. Lung sounds clear. Tele SR, pt denied chest pain this shift. Up independently in room, voiding in bathroom. Regular diet. CMS intact, R groin site WNL. No PRN pain medications given this shift. 2x PIV SL. Will continue to follow plan of care.

## 2023-01-05 NOTE — PROGRESS NOTES
"   01/05/23 0937   Appointment Info   Signing Clinician's Name / Credentials (OT) Gloria Ruiz OTR/L   Living Environment   People in Home spouse   Current Living Arrangements house   Home Accessibility stairs within home  (four levels)   Self-Care   Usual Activity Tolerance good   Current Activity Tolerance moderate   Fall history within last six months no   Activity/Exercise/Self-Care Comment At baseline is independent in self-cares without gait aid   Instrumental Activities of Daily Living (IADL)   Previous Responsibilities meal prep;housekeeping;laundry;shopping;medication management;finances;driving;work   IADL Comments teaches first grade   General Information   Onset of Illness/Injury or Date of Surgery 01/04/23   Referring Physician Cherry, Kristopher Ramsey MD   Patient/Family Therapy Goal Statement (OT) Return home when medically ready   Additional Occupational Profile Info/Pertinent History of Current Problem 57 year old female admitted on 1/4/2023.  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 Had angio with no intervention   Existing Precautions/Restrictions cardiac  (groin site)   Heart Disease Risk Factors Dislipidemia   Cognitive Status Examination   Affect/Mental Status (Cognitive) WFL   Follows Commands follows multi-step commands   Visual Perception   Visual Impairment/Limitations corrective lenses full-time   Sensory   Sensory Comments Pt reports onset of \"odd sensation\" on volar surfaces of BUEs from palm to mid forearm. RN and cardiology aware   Pain Assessment   Patient Currently in Pain No   Transfers   Transfer Comments Pt independent in bed mobility, transfers, ambulation   Activities of Daily Living   BADL Assessment/Intervention no deficits identified   Clinical Impression   Criteria for Skilled Therapeutic Interventions Met (OT) Yes, treatment indicated   OT Diagnosis Cardiac " rehab   OT Problem List-Impairments impacting ADL activity tolerance impaired   Assessment of Occupational Performance 1-3 Performance Deficits   Identified Performance Deficits IADLs including health management   Planned Therapy Interventions (OT) home program guidelines;progressive activity/exercise;risk factor education   Clinical Decision Making Complexity (OT) low complexity   Risk & Benefits of therapy have been explained evaluation/treatment results reviewed;care plan/treatment goals reviewed;risks/benefits reviewed;current/potential barriers reviewed;participants voiced agreement with care plan;participants included;patient   OT Total Evaluation Time   OT Eval, Low Complexity Minutes (39747) 8   OT Goals   Therapy Frequency (OT) 2 times/day   OT Predicted Duration/Target Date for Goal Attainment 01/09/23   OT Goals Cardiac Phase 1   OT: Understanding of cardiac education to maximize quality of life, condition management, and health outcomes Patient;Verbalize   OT: Perform aerobic activity with stable cardiovascular response 10 minutes;treadmill   OT: Functional/aerobic ambulation tolerance with stable cardiovascular response in order to return to home and community environment Greater than 300 feet;Modified independent   OT: Navigation of stairs simulating home set up with stable cardiovascular response in order to return to home and community environment Greater than 10 stairs;Modified independent   Interventions   Interventions Quick Adds Therapeutic Activity;Therapeutic Procedures/Exercise;Cardiac Rehab   Therapeutic Procedures/Exercise   Therapeutic Procedure: strength, endurance, ROM, flexibillity minutes (86793) 15   Treatment Detail/Skilled Intervention Pt ambulated in vazquez and navigated stairs; refer to flowsheets below and VS flowsheet. /72 pre activity and 125/77 post activity. HR  at rest and max of 111 with activity noted.   Treatment Time Includes (CR Only) Monitoring of vital signs  (see vital signs flowsheet for details);See specific exercise details intervention group(s)   Therapeutic Activities   Therapeutic Activity Minutes (59647) 8   Treatment Detail/Skilled Intervention Pt participated in cardiac education using educational handouts; refer to flowsheet below for details. Pt very engaged. Education stopped early as Cardiology provider arrived   Ambulation   Workload 400 ft   Effort Scale 2   Symptoms Denies symptoms   Cardiovascular Response Normal   Exercise Details Pt ambulated without gait aid at fair pace with modified independence   Stairs   Workload 15 stairs   Effort Scale 2   Symptoms Denies symptoms   Cardiovascular Response Normal   Cardiac Education   Education Provided Diagnosis;OMNI Scale;Outpatient Cardiac Rehab;Precautions;Resuming home activities;Risk factors;Signs and symptoms   Education Packet Given to Patient Yes   All Patient Education Handouts Reviewed with Patient and/or Family No   Cardiac Rehab Phase II Plan   Phase II Order Received No   OT Discharge Planning   OT Plan Initiate treadmill; finish heart disease education   OT Discharge Recommendation (DC Rec) home with assist;home with outpatient cardiac rehab   OT Rationale for DC Rec Anticipate that with further medical management that pt will discharge home with assist with strenuous IADLs (heavy cleaning, yardwork, etc). Recommend OP cardiac rehab for a progressive monitor exercise program and to promote heart healthy lifestyle   OT Brief overview of current status Ambulated in vazquez and navigated 15 stairs with stable VS. Reporting some sensation change in volar surface of BUEs from palm to mid forearm. Otherwise denies cardiac symptoms   Total Session Time   Timed Code Treatment Minutes 23   Total Session Time (sum of timed and untimed services) 31

## 2023-01-05 NOTE — PROGRESS NOTES
Assessment and Plan:     1. Acute MI, without ST elevation, probably electrocardiographically silent due to circumflex disease.  Peak troponin 1500.  2. Mild left ventricular dysfunction, ejection fraction 45 to 50%.  I reviewed the echocardiogram, there is severe hypokinesis involving the basal and mid lateral and inferolateral wall.  3. Coronary artery disease, with acute occlusion of the ostial left circumflex, likely due to scad, treated medically.  The patient was transferred to [facility] during observation phase, in case the dissection progressed into the left main and would require emergency intervention.  4. Hypercholesterolemia    Recommendations:    Continue aspirin 81 mg daily, Plavix 75 mg daily, Imdur, and metoprolol.  No recommendation for heparin since the circumflex is occluded.  The benefit for atorvastatin is questionable but will continue that for now.    Cardiac rehab    SHANNA CASSIDY MD        Interval History:   doing well; no cp, sob, n/v/d, or abd pain.          Medications:       aspirin  81 mg Oral Daily     atorvastatin  40 mg Oral QPM     clopidogrel  75 mg Oral Daily     isosorbide mononitrate  30 mg Oral Daily     metoprolol tartrate  25 mg Oral BID     multivitamin w/minerals  1 tablet Oral Daily     sodium chloride (PF)  3 mL Intracatheter Q8H            Physical Exam:   Blood pressure 92/52, pulse 88, temperature 99.1  F (37.3  C), temperature source Oral, resp. rate 18, weight 98.4 kg (216 lb 14.4 oz), SpO2 92 %, not currently breastfeeding.    Vitals:    01/04/23 2354   Weight: 98.4 kg (216 lb 14.4 oz)       No intake or output data in the 24 hours ending 01/05/23 0951    No intake/output data recorded.    Exam:  GENERAL APPEARANCE ADULT: Alert, in no acute distress  RESP: lungs clear to auscultation   CV: regular rate and rhythm, no murmur, rub, or gallop  ABDOMEN: normal bowel sounds, soft, nontender, no hepatosplenomegaly or other  masses  EXTREMITIES: No edema         Data:   LABS (Last four results)  CMP  Recent Labs   Lab 01/05/23  0608 01/03/23  0641 01/02/23  1329 01/02/23  0753    139  --  139   POTASSIUM 3.6 4.0  --  4.6   CHLORIDE 106 103  --  103   CO2 26 23  --  24   ANIONGAP 8 13  --  12   GLC 99 117*  --  150*   BUN 13 10.3  --  10.2   CR 0.61 0.62  --  0.70   GFRESTIMATED >90 >90  --  >90   JARRET 8.8 9.0  --  9.1   MAG 2.4*  --   --   --    PROTTOTAL 6.8  --  7.6  --    ALBUMIN 3.2*  --  4.7  --    BILITOTAL 1.5*  --  0.5  --    ALKPHOS 84  --  110*  --    AST 47*  --  37*  --    ALT 25  --  21  --      CBC  Recent Labs   Lab 01/05/23  0608 01/03/23  0641 01/02/23  0753   WBC 11.2* 15.9* 10.6   RBC 4.08 4.68 4.69   HGB 12.4 14.0 14.0   HCT 38.0 43.6 45.6   MCV 93 93 97   MCH 30.4 29.9 29.9   MCHC 32.6 32.1 30.7*   RDW 13.6 13.7 13.6    416 379     INRNo lab results found in last 7 days.  TROPONINS No results found for: TROPI, TROPONIN, TROPR, TROPN                                                                                                            SHANNA CASSIDY MD

## 2023-01-05 NOTE — PHARMACY-ADMISSION MEDICATION HISTORY
Pharmacy Medication History  Admission medication history for the 1/4/2023 admission compiled 100% from [facility] notes and MAR.      Additional medication history information:   All prescription medications were started during [facility] admission.  Patient will require these be filled on discharge from [facility],        Prior to Admission medications    Medication Sig Last Dose Taking? Auth Provider Long Term End Date   aspirin (ASA) 81 MG EC tablet Take 1 tablet (81 mg) by mouth daily for 30 days 1/4/2023 at AM Yes Alayna Red MD  2/4/23   atorvastatin (LIPITOR) 40 MG tablet Take 1 tablet (40 mg) by mouth every evening for 30 days 1/4/2023 at PM Yes Alayna Red MD Yes 2/3/23   clopidogrel (PLAVIX) 75 MG tablet Take 1 tablet (75 mg) by mouth daily for 30 days 1/4/2023 at AM Yes Alayna Red MD Yes 2/4/23   isosorbide mononitrate (IMDUR) 30 MG 24 hr tablet Take 1 tablet (30 mg) by mouth daily for 30 days 1/4/2023 at AM Yes Alayna Red MD Yes 2/4/23   metoprolol tartrate (LOPRESSOR) 25 MG tablet Take 1 tablet (25 mg) by mouth 2 times daily for 60 days 1/4/2023 at x2 Yes Alayna Red MD Yes 3/5/23   multivitamin w/minerals (THERA-VIT-M) tablet Take 1 tablet by mouth daily 1/4/2023 at AM Yes Reported, Patient     acetaminophen (TYLENOL) 325 MG tablet Take 2 tablets (650 mg) by mouth every 4 hours as needed for mild pain or headaches 1/3/2023 at PRN  Alayna Red MD         The information provided in this note is only as accurate as the sources available at the time of update(s)

## 2023-01-05 NOTE — PROGRESS NOTES
[facility]    Hospitalist Progress Note    Assessment & Plan   Palak Eckert is a 57 year old female with no known PMHx and not on any medications at baseline other than NSAID and MVI who was initially admitted to [facility] on 1/2/2023 for evaluation of chest pain with findings of an NSTEMI. Was seen by cardiology and underwent coronary angiogram on 1/3. Found to have SCAD lesion involving left circumflex. Per Dr. Zelaya, PCI of lesion would be high risk and conservative mgmt was recommended. Appropriate medications were started. Due to involvement of the ostial left circumflex, concerns were raised for development of possible retrograde extension into the left main. It was felt that patient should be transferred to [facility] for ongoing close cardiac monitoring. Was transferred from [facility] to [facility] on 1/4/2023.     NSTEMI dt SCAD  * With presentation and workup at [facility] as above.   * Cardiac cath showed an acute occlusion of the ostial left circumflex, likely due to SCAD.   * Trops peaked at 1500. Echo showed EF 45-50% with severe hypokinesis involving the basal and mid lateral and inferolateral wall.  * Treated medically. Started on new regimen of medications including DAPT with ASA/Plavix, statin, Imdur and metoprolol.   -- cont to monitor for potential progression of dissection, if this occurs would require emergent intervention  -- cont ASA, Plavix, Imdur and metoprolol  -- conts on statin, FLP showed LDL 89, HDL 55  -- OP follow up with Dr. Tee for evaluation of possible fibromuscular dysplasia    Obesity  * BMI 38.9 this stay.   * Encourage diet/lifestyle changes    FEN: no IVFs, lytes stable, regular diet  DVT Prophylaxis: PCDs, does not need heparin gtt at this point  Code Status: Full Code    Disposition: Anticipate discharge home in 2 days pending recommendations per cardiology    Ekta Treviño, DO    Medical Decision Making       35 MINUTES  SPENT BY ME on the date of service doing chart review, history, exam, documentation & further activities per the note.         Interval History   Seen this afternoon. 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 Notes her job as a  causes a great amount of stress and has been for the whole academic year.  Otheriwse feeling okay. Denies cp/sob/cough, abd pain/n/v. Walked on treadmill with cardiac rehab today and says session went well.     -Data reviewed today: I reviewed all new labs and imaging results over the last 24 hours. I personally reviewed no images or EKG's today.    Physical Exam   Temp: 98.3  F (36.8  C) Temp src: Oral BP: 110/67 Pulse: 97   Resp: 16 SpO2: 94 % O2 Device: None (Room air)    Vitals:    01/04/23 2354   Weight: 98.4 kg (216 lb 14.4 oz)     Vital Signs with Ranges  Temp:  [98  F (36.7  C)-99.1  F (37.3  C)] 98.3  F (36.8  C)  Pulse:  [] 97  Resp:  [16-24] 16  BP: ()/(52-77) 110/67  SpO2:  [92 %-96 %] 94 %  No intake/output data recorded.    Constitutional: Resting comfortably, alert and conversing appropriately, NAD  Respiratory: CTAB, no wheeze/rales/rhonchi, no increased work of breathing  Cardiovascular: HRRR, no MGR, no LE edema  GI: S, NT, ND, +BS  Skin/Integumen: warm/dry  Other:      Medications     - MEDICATION INSTRUCTIONS -       - MEDICATION INSTRUCTIONS -       - MEDICATION INSTRUCTIONS -       - MEDICATION INSTRUCTIONS -       ACE/ARB/ARNI NOT PRESCRIBED         aspirin  81 mg Oral Daily     atorvastatin  40 mg Oral QPM     clopidogrel  75 mg Oral Daily     isosorbide mononitrate  30 mg Oral Daily     metoprolol tartrate  25 mg Oral BID     multivitamin w/minerals  1 tablet Oral Daily     sodium chloride (PF)  3 mL Intracatheter Q8H       Data   Recent Labs   Lab 01/05/23  0608 01/03/23  0641 01/02/23  1329 01/02/23  0753   WBC 11.2* 15.9*  --  10.6   HGB 12.4 14.0  --  14.0   MCV 93 93  --  97    416  --  379    139  --  139   POTASSIUM  3.6 4.0  --  4.6   CHLORIDE 106 103  --  103   CO2 26 23  --  24   BUN 13 10.3  --  10.2   CR 0.61 0.62  --  0.70   ANIONGAP 8 13  --  12   JARRET 8.8 9.0  --  9.1   GLC 99 117*  --  150*   ALBUMIN 3.2*  --  4.7  --    PROTTOTAL 6.8  --  7.6  --    BILITOTAL 1.5*  --  0.5  --    ALKPHOS 84  --  110*  --    ALT 25  --  21  --    AST 47*  --  37*  --    LIPASE  --   --  22  --        No results found for this or any previous visit (from the past 24 hour(s)).

## 2023-01-05 NOTE — H&P
[facility]    History and Physical - Hospitalist Service       Date of Admission: 1/4/2023    Assessment & Plan      Palak Eckert is a 57 year old female admitted on 1/4/2023.  She presents as a direct admission from 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    Non-ST elevation myocardial infarction: Initial diagnosis at [facility] with occluded left circumflex associated with spontaneous coronary artery dissection.  Transferred for additional monitoring with high risk lesion.  -Continue aspirin 81 mg daily  -Atorvastatin 40 mg daily  -Plavix 75 mg daily  -Continue Imdur 30 mg daily  -Metoprolol tartrate 25 mg twice daily; switch to Toprol-XL prior to discharge  -Cardiology consulted, aware of patient from outside hospital.  Recommendation from cardiology was for 3 days of monitoring given high risk lesion; transferred to [facility] for this monitoring with conservative management given potential for complicated PCI if clinical deterioration (possibility that ostial L Cx could extent retrograde into L main).  -Cardiac telemetry   -conditional/PRN EKG for anginal equivalent.  -Outpatient fibromuscular dysplasia work-up per cardiology (Was recommended to Dr Tee at [facility])  -Note cardiology recommendation for repeat CT coronary vs angiogram in ~2 weeks; will defer modality to outpatient cardiology team, potentially w/ FMD workup  -As needed stat EKG for recurrence of chest discomfort none-cardiac rehab    Obesity, class II:  -Cardiac rehabilitation consult  -Diet, exercise, weight loss as able       Diet:  Regular adult diet  DVT Prophylaxis: Pneumatic compression devices  Kirby Catheter: Not present  Lines: None     Cardiac Monitoring: None  Code Status:  Full code    Clinically Significant Risk Factors Present on Admission                               Disposition Plan      Potentially 1/6/2023 pending  cardiology recommendations.  Recommendation was for 3 days of monitoring, though uncertain when as to time 0 assessment (angiogram vs onset of symptoms)    Kristopher Cherry MD  Hospitalist Service  [facility]  Securely message with [employer] (more info)  Text page via [facility] Paging/Directory     ______________________________________________________________________    Chief Complaint   Currently asymptomatic, prior central chest discomfort radiating to neck and left arm, nausea, diaphoresis.    History is obtained from patient, chart review    History of Present Illness   Palak Eckert is a 57 year old female who presented originally to [facility] 1/2/2023 noting chest discomfort upon awakening.  Associated nausea and diaphoresis.  Radiation of discomfort to left arm.    Troponin was obtained, elevated consistent with non-ST elevation myocardial infarction.  Patient was initiated on aspirin, statin, metoprolol, and a heparin infusion.  A coronary angiogram was undertaken 1/3/2023 with a left circumflex artery dissection.    With proximal circumflex occlusion and concerned that retrograde dissection could occur resulting in left main STEMI, patient was transferred to [facility] for ongoing monitoring.  Patient tells me that overnight 1/2/2023 into 1/3/2023 she continued to have symptoms, though her symptoms abated prior to angiogram 1/3, and she has not had return of discomfort, nausea, or cold sweats.    There are recommendations in place for repeat coronary angiogram or CT coronary in approximately 2 weeks, outpatient fibromuscular dysplasia work-up with Dr. Tee.    Patient reports no prior history of joint dislocation, no history of aneurysms or dissections in family.  She sprained her ankle once in high school playing basketball, though injuries associated with joint laxity were not a frequent issue for her.    Majority of time in meeting with patient was  discussion regarding current findings, recommendation for follow-up including fibromuscular dysplasia evaluation as an outpatient, recommendations for 3 days of monitoring given high risk lesion of dissection.    On arrival, patient did complain of some left arm discomfort, though attributed this to EMS blood pressure cuff.  EKG was obtained without evidence of ST elevation myocardial infarction.  Symptoms resolved during our subsequent discussion.    Past Medical History    Past Medical History:   Diagnosis Date     Coronary artery dissection 01/03/2023    Left circumflex     Unspecified sinusitis (chronic)        Past Surgical History   Past Surgical History:   Procedure Laterality Date     COLONOSCOPY  8/11/2015    Dr. No [facility]     COLONOSCOPY N/A 8/11/2015    Procedure: COLONOSCOPY;  Surgeon: Jake No MD;  Location:  GI     CV CORONARY ANGIOGRAM N/A 1/3/2023    Procedure: Coronary Angiogram;  Surgeon: Chong Schmitz MD;  Location:  HEART CARDIAC CATH LAB     CV LEFT HEART CATH N/A 1/3/2023    Procedure: Left Heart Catheterization;  Surgeon: Chong Schmitz MD;  Location:  HEART CARDIAC CATH LAB     [facility] NONSPECIFIC PROCEDURE  2001    sinus op       Prior to Admission Medications   Prior to Admission Medications   Prescriptions Last Dose Informant Patient Reported? Taking?   acetaminophen (TYLENOL) 325 MG tablet 1/3/2023 at PRN  No No   Sig: Take 2 tablets (650 mg) by mouth every 4 hours as needed for mild pain or headaches   aspirin (ASA) 81 MG EC tablet 1/4/2023 at AM  No Yes   Sig: Take 1 tablet (81 mg) by mouth daily for 30 days   atorvastatin (LIPITOR) 40 MG tablet 1/4/2023 at PM  No Yes   Sig: Take 1 tablet (40 mg) by mouth every evening for 30 days   clopidogrel (PLAVIX) 75 MG tablet 1/4/2023 at AM  No Yes   Sig: Take 1 tablet (75 mg) by mouth daily for 30 days   isosorbide mononitrate (IMDUR) 30 MG 24 hr tablet 1/4/2023 at AM  No Yes   Sig: Take 1 tablet (30 mg) by mouth daily  "for 30 days   metoprolol tartrate (LOPRESSOR) 25 MG tablet 1/4/2023 at x2  No Yes   Sig: Take 1 tablet (25 mg) by mouth 2 times daily for 60 days   multivitamin w/minerals (THERA-VIT-M) tablet 1/4/2023 at AM  Yes Yes   Sig: Take 1 tablet by mouth daily      Facility-Administered Medications: None        Social History   I have reviewed this patient's social history and updated it with pertinent information if needed.  Social History     Tobacco Use     Smoking status: Never     Smokeless tobacco: Never   Substance Use Topics     Alcohol use: No     Comment: maybe 1 time per year     Drug use: No    Is a  in a private school; reports significant stress this year  2 daughters went to college this year    Physical Exam    Vital signs:  Temp: 98.7  F (37.1  C) Temp src: Oral BP: 133/72 Pulse: 91   Resp: 18 SpO2: 96 % O2 Device: None (Room air)     Weight: 98.4 kg (216 lb 14.4 oz)  Estimated body mass index is 38.96 kg/m  as calculated from the following:    Height as of 4/6/22: 1.589 m (5' 2.56\").    Weight as of this encounter: 98.4 kg (216 lb 14.4 oz).    General Appearance: Generally comfortable appearing obese 57-year-old female resting in bed.  She is in no acute distress.  Eyes: Mild scleral injection of right greater than left eye.  No scleral icterus.    HEENT: Normocephalic and atraumatic  Respiratory: Breath sounds are clear bilaterally to auscultation.  No wheezes, no crackles  Cardiovascular: Regular rate and rhythm.  No murmur.  GI: Abdomen obese, soft, nontender to palpation.  No palpable mass.  Skin: No jaundice, no lower extremity petechiae  Musculoskeletal: Muscular tone and bulk intact in all extremities and appropriate for age.  No subcutaneous fat wasting  Neurologic: Alert, conversant, appropriate conversation.  Mental status is grossly intact.  Psychiatric: Very pleasant, mildly anxious    Medical Decision Making       65 MINUTES SPENT BY ME on the date of service doing chart " review, history, exam, documentation & further activities per the note.  The majority of this time was spent at patient's bedside in discussion with current findings, recommendation for follow-up.      Data     I have personally reviewed the following data over the past 24 hrs:    Trop: 1,455 () BNP: N/A       Imaging results reviewed over the past 24 hrs:   EKG obtained on arrival with no acute ST elevations concerning for STEMI.

## 2023-01-06 ENCOUNTER — APPOINTMENT (OUTPATIENT)
Dept: OCCUPATIONAL THERAPY | Facility: CLINIC | Age: 58
End: 2023-01-06
Attending: HOSPITALIST
Payer: COMMERCIAL

## 2023-01-06 DIAGNOSIS — I21.4 NSTEMI (NON-ST ELEVATED MYOCARDIAL INFARCTION) (H): Primary | ICD-10-CM

## 2023-01-06 LAB
ATRIAL RATE - MUSE: 90 BPM
DIASTOLIC BLOOD PRESSURE - MUSE: NORMAL MMHG
INTERPRETATION ECG - MUSE: NORMAL
MAGNESIUM SERPL-MCNC: 2.5 MG/DL (ref 1.6–2.3)
P AXIS - MUSE: 42 DEGREES
POTASSIUM BLD-SCNC: 3.8 MMOL/L (ref 3.4–5.3)
PR INTERVAL - MUSE: 134 MS
QRS DURATION - MUSE: 86 MS
QT - MUSE: 384 MS
QTC - MUSE: 469 MS
R AXIS - MUSE: 5 DEGREES
SYSTOLIC BLOOD PRESSURE - MUSE: NORMAL MMHG
T AXIS - MUSE: 79 DEGREES
VENTRICULAR RATE- MUSE: 90 BPM

## 2023-01-06 PROCEDURE — 250N000013 HC RX MED GY IP 250 OP 250 PS 637: Performed by: INTERNAL MEDICINE

## 2023-01-06 PROCEDURE — 99232 SBSQ HOSP IP/OBS MODERATE 35: CPT | Performed by: INTERNAL MEDICINE

## 2023-01-06 PROCEDURE — 84132 ASSAY OF SERUM POTASSIUM: CPT | Performed by: INTERNAL MEDICINE

## 2023-01-06 PROCEDURE — 97110 THERAPEUTIC EXERCISES: CPT | Mod: GO | Performed by: OCCUPATIONAL THERAPIST

## 2023-01-06 PROCEDURE — 120N000001 HC R&B MED SURG/OB

## 2023-01-06 PROCEDURE — 36415 COLL VENOUS BLD VENIPUNCTURE: CPT | Performed by: INTERNAL MEDICINE

## 2023-01-06 PROCEDURE — 83735 ASSAY OF MAGNESIUM: CPT | Performed by: INTERNAL MEDICINE

## 2023-01-06 RX ADMIN — METOPROLOL TARTRATE 25 MG: 25 TABLET, FILM COATED ORAL at 20:04

## 2023-01-06 RX ADMIN — ASPIRIN 81 MG: 81 TABLET, COATED ORAL at 08:55

## 2023-01-06 RX ADMIN — CLOPIDOGREL BISULFATE 75 MG: 75 TABLET ORAL at 08:55

## 2023-01-06 RX ADMIN — MULTIPLE VITAMINS W/ MINERALS TAB 1 TABLET: TAB at 08:55

## 2023-01-06 RX ADMIN — ATORVASTATIN CALCIUM 40 MG: 40 TABLET, FILM COATED ORAL at 20:04

## 2023-01-06 RX ADMIN — ISOSORBIDE MONONITRATE 30 MG: 30 TABLET, EXTENDED RELEASE ORAL at 08:55

## 2023-01-06 RX ADMIN — METOPROLOL TARTRATE 25 MG: 25 TABLET, FILM COATED ORAL at 08:55

## 2023-01-06 RX ADMIN — ACETAMINOPHEN 1000 MG: 500 TABLET ORAL at 18:17

## 2023-01-06 ASSESSMENT — ACTIVITIES OF DAILY LIVING (ADL)
ADLS_ACUITY_SCORE: 35

## 2023-01-06 NOTE — PLAN OF CARE
Goal Outcome Evaluation:       Shift Notes - [id number]  Neuro: x4  CV: NSR, normotensive   Respiratory: RA  GI/: contx2  Skin: intact, bandaid on r groin cath site (1/3 [facility]), soft, no s/s of hematoma   Activity: ind  Diet: reg  Drips:   Other:   Plan:  home with med management (when cardiac clears)

## 2023-01-06 NOTE — PLAN OF CARE
Orientations: a/o x4     Vitals/Pain: VSS on RA  Tele: SR  Lines/Drains: x2 PIV  Skin/Wounds: R. Groin site   GI/: Independently - no concerns reported  Labs: Abnormal/Trends, Electrolyte Replacement- monitor Mg, K  Ambulation/Assist: Independent in room  Sleep Quality: good  Plan: discharge home TBD

## 2023-01-06 NOTE — PLAN OF CARE
Vitals stable on RA  Pain 2/10- Headache throughout the day, tylenol at 1820pm  A&Ox4  Voiding adequately  Mobility- Independent  Tele NSR  CMS intact  Lung Sounds CL on RA   GI WNL  Dressing CDI  PIV saline locked  Regular diet   Skin- Bruise R forearm, marked     Plan: Discharge tomorrow long as no acute chest pain/episode comes up

## 2023-01-06 NOTE — PROGRESS NOTES
Assessment and Plan:     1. Acute MI, without ST elevation, probably electrocardiographically silent due to circumflex disease.  Peak troponin 1500.  2. Mild left ventricular dysfunction, ejection fraction 45 to 50%.  I reviewed the echocardiogram, there is severe hypokinesis involving the basal and mid lateral and inferolateral wall.  3. Coronary artery disease, with acute occlusion of the ostial left circumflex, likely due to scad, treated medically.  The patient was transferred to [facility] during observation phase, in case the dissection progressed into the left main and would require emergency intervention.  4. Hypercholesterolemia    Recommendations:    So far she has had no recurrent chest discomfort symptoms.  She has a little bit of a headache and few spots of blood on tissue from her nose.  I suspect the headache is from Imdur and advised her to continue with use of Tylenol as needed.  The nosebleeds do not sound serious yet, but if she has nosebleeds that require compression or emergency room evaluation, she will likely need cauterization for management.    I cautioned her not to do any strenuous exercise or heavy lifting, including no straining on the toilet.  I would recommend a 10 pound lifting restriction for minimum of 6 weeks.  I also recommended that she avoid the emotional stress of work for a period of 4 to 6 weeks.    Continue aspirin 81 mg daily, Plavix 75 mg daily, Imdur, and metoprolol.  I will reduce her dose of Imdur to 15 mg daily due to headaches and low blood pressures. The benefit for atorvastatin is questionable but will continue that for now.    Cardiac rehab    Okay for discharge tomorrow as long as she has no recurrent chest discomfort symptoms.  She knows to return to the emergency room by ambulance if she has sustained chest discomfort consistent with her previous angina after returning home.  Cardiology will sign off.    SHANNA CASSIDY MD         Interval History:   doing well; no cp, sob, n/v/d, or abd pain.          Medications:       aspirin  81 mg Oral Daily     atorvastatin  40 mg Oral QPM     clopidogrel  75 mg Oral Daily     isosorbide mononitrate  30 mg Oral Daily     metoprolol tartrate  25 mg Oral BID     multivitamin w/minerals  1 tablet Oral Daily     sodium chloride (PF)  3 mL Intracatheter Q8H            Physical Exam:   Blood pressure 96/58, pulse 78, temperature 98.2  F (36.8  C), temperature source Oral, resp. rate 16, weight 98.4 kg (216 lb 14.4 oz), SpO2 94 %, not currently breastfeeding.    Vitals:    01/04/23 2354   Weight: 98.4 kg (216 lb 14.4 oz)       No intake or output data in the 24 hours ending 01/05/23 0951    01/01 0700 - 01/06 0659  In: 480 [P.O.:480]  Out: -   Net: 480    Exam:  GENERAL APPEARANCE ADULT: Alert, in no acute distress  RESP: lungs clear to auscultation   CV: regular rate and rhythm, no murmur, rub, or gallop  ABDOMEN: normal bowel sounds, soft, nontender, no hepatosplenomegaly or other masses  EXTREMITIES: No edema         Data:   LABS (Last four results)  CMP  Recent Labs   Lab 01/06/23  0632 01/05/23  0608 01/03/23  0641 01/02/23  1329 01/02/23  0753   NA  --  140 139  --  139   POTASSIUM 3.8 3.6 4.0  --  4.6   CHLORIDE  --  106 103  --  103   CO2  --  26 23  --  24   ANIONGAP  --  8 13  --  12   GLC  --  99 117*  --  150*   BUN  --  13 10.3  --  10.2   CR  --  0.61 0.62  --  0.70   GFRESTIMATED  --  >90 >90  --  >90   JARRET  --  8.8 9.0  --  9.1   MAG 2.5* 2.4*  --   --   --    PROTTOTAL  --  6.8  --  7.6  --    ALBUMIN  --  3.2*  --  4.7  --    BILITOTAL  --  1.5*  --  0.5  --    ALKPHOS  --  84  --  110*  --    AST  --  47*  --  37*  --    ALT  --  25  --  21  --      CBC  Recent Labs   Lab 01/05/23  0608 01/03/23  0641 01/02/23  0753   WBC 11.2* 15.9* 10.6   RBC 4.08 4.68 4.69   HGB 12.4 14.0 14.0   HCT 38.0 43.6 45.6   MCV 93 93 97   MCH 30.4 29.9 29.9   MCHC 32.6 32.1 30.7*   RDW 13.6 13.7 13.6     416 379     INRNo lab results found in last 7 days.  TROPONINS No results found for: TROPI, TROPONIN, TROPR, TROPN                                                                                                            SHANNA CASSIDY MD

## 2023-01-06 NOTE — PROVIDER NOTIFICATION
"Text page to hospitalist Dr. Treviño:    \"can this pt have tylenol or something for a headache? She only has oxycodone ordered. She said Dr. Cherry told her one of her new meds might cause headaches\"    PRN tylenol ordered  "

## 2023-01-06 NOTE — PLAN OF CARE
"A&O x4. VSS on RA. Tele NSR. Up independently. Worked with Cardiac rehab twice today, once down in the rehab gym, tolerated it well. LS clear. BS+, BM+. Voiding. R groin site CDI with band aid in place, CMS intact.  Tylenol given once for mild headache. Denies chest pain, SOB etc. Brief \"odd\" sensation in bilateral wrists/hands reported to cardiologist when he rounded went away.    "

## 2023-01-06 NOTE — PROGRESS NOTES
[facility]    Hospitalist Progress Note    Assessment & Plan   Palak Eckert is a 57 year old female with no known PMHx and not on any medications at baseline other than NSAID and MVI who was initially admitted to [facility] on 1/2/2023 for evaluation of chest pain with findings of an NSTEMI. Was seen by cardiology and underwent coronary angiogram on 1/3. Found to have SCAD lesion involving left circumflex. Per Dr. Zelaya, PCI of lesion would be high risk and conservative mgmt was recommended. Appropriate medications were started. Due to involvement of the ostial left circumflex, concerns were raised for development of possible retrograde extension into the left main. It was felt that patient should be transferred to [facility] for ongoing close cardiac monitoring. Was transferred from [facility] to [facility] on 1/4/2023.     NSTEMI dt SCAD  * With presentation and workup at [facility] as above.   * Cardiac cath showed an acute occlusion of the ostial left circumflex, likely due to SCAD.   * Trops peaked at 1500. Echo showed EF 45-50% with severe hypokinesis involving the basal and mid lateral and inferolateral wall.  * Treated medically. Started on new regimen of medications including DAPT with ASA/Plavix, statin, Imdur and metoprolol.   -- cont to monitor for potential progression of dissection, if this occurs would require emergent intervention  -- cont ASA, Plavix, Imdur and metoprolol  -- conts on statin, FLP showed LDL 89, HDL 55  -- cont cardiac regab  -- OP follow up with Dr. Tee for evaluation of possible fibromuscular dysplasia    Obesity  * BMI 38.9 this stay.   * Encourage diet/lifestyle changes    FEN: no IVFs, lytes stable, regular diet  DVT Prophylaxis: PCDs, does not need heparin gtt at this point  Code Status: Full Code    Disposition: Anticipate discharge home in 1-2 days pending recommendations per cardiology    Ekta Treviño, DO    Medical Decision  Making       35 MINUTES SPENT BY ME on the date of service doing chart review, history, exam, documentation & further activities per the note.         Interval History    Uneventful night. Seen this morning. Feeling okay. No cp/sob/cough, abd pain/n/v. Tolerating po intake. To work with cardiac rehab this AM.     -Data reviewed today: I reviewed all new labs and imaging results over the last 24 hours. I personally reviewed no images or EKG's today.    Physical Exam   Temp: 98.5  F (36.9  C) Temp src: Oral BP: 105/59 Pulse: 84   Resp: 16 SpO2: 94 % O2 Device: None (Room air)    Vitals:    01/04/23 2354   Weight: 98.4 kg (216 lb 14.4 oz)     Vital Signs with Ranges  Temp:  [97.4  F (36.3  C)-98.5  F (36.9  C)] 98.5  F (36.9  C)  Pulse:  [] 84  Resp:  [16-18] 16  BP: ()/(55-77) 105/59  SpO2:  [94 %-100 %] 94 %  I/O last 3 completed shifts:  In: 480 [P.O.:480]  Out: -     Constitutional: Resting comfortably, alert and conversing appropriately, NAD  Respiratory: CTAB, no wheeze/rales/rhonchi, no increased work of breathing  Cardiovascular: HRRR, no MGR, no LE edema  GI: S, NT, ND, +BS  Skin/Integumen: warm/dry  Other:      Medications     - MEDICATION INSTRUCTIONS -       - MEDICATION INSTRUCTIONS -       - MEDICATION INSTRUCTIONS -       - MEDICATION INSTRUCTIONS -       ACE/ARB/ARNI NOT PRESCRIBED         aspirin  81 mg Oral Daily     atorvastatin  40 mg Oral QPM     clopidogrel  75 mg Oral Daily     isosorbide mononitrate  30 mg Oral Daily     metoprolol tartrate  25 mg Oral BID     multivitamin w/minerals  1 tablet Oral Daily     sodium chloride (PF)  3 mL Intracatheter Q8H       Data   Recent Labs   Lab 01/06/23  0632 01/05/23  0608 01/03/23  0641 01/02/23  1329 01/02/23  0753   WBC  --  11.2* 15.9*  --  10.6   HGB  --  12.4 14.0  --  14.0   MCV  --  93 93  --  97   PLT  --  325 416  --  379   NA  --  140 139  --  139   POTASSIUM 3.8 3.6 4.0  --  4.6   CHLORIDE  --  106 103  --  103   CO2  --  26 23  --   24   BUN  --  13 10.3  --  10.2   CR  --  0.61 0.62  --  0.70   ANIONGAP  --  8 13  --  12   JARRET  --  8.8 9.0  --  9.1   GLC  --  99 117*  --  150*   ALBUMIN  --  3.2*  --  4.7  --    PROTTOTAL  --  6.8  --  7.6  --    BILITOTAL  --  1.5*  --  0.5  --    ALKPHOS  --  84  --  110*  --    ALT  --  25  --  21  --    AST  --  47*  --  37*  --    LIPASE  --   --   --  22  --        No results found for this or any previous visit (from the past 24 hour(s)).

## 2023-01-07 VITALS
BODY MASS INDEX: 38.95 KG/M2 | SYSTOLIC BLOOD PRESSURE: 112 MMHG | HEART RATE: 81 BPM | OXYGEN SATURATION: 95 % | TEMPERATURE: 98.2 F | DIASTOLIC BLOOD PRESSURE: 67 MMHG | RESPIRATION RATE: 16 BRPM | WEIGHT: 216.8 LBS

## 2023-01-07 LAB
MAGNESIUM SERPL-MCNC: 2.5 MG/DL (ref 1.6–2.3)
POTASSIUM BLD-SCNC: 4 MMOL/L (ref 3.4–5.3)

## 2023-01-07 PROCEDURE — 83735 ASSAY OF MAGNESIUM: CPT | Performed by: HOSPITALIST

## 2023-01-07 PROCEDURE — 250N000013 HC RX MED GY IP 250 OP 250 PS 637: Performed by: INTERNAL MEDICINE

## 2023-01-07 PROCEDURE — 36415 COLL VENOUS BLD VENIPUNCTURE: CPT | Performed by: HOSPITALIST

## 2023-01-07 PROCEDURE — 99239 HOSP IP/OBS DSCHRG MGMT >30: CPT | Performed by: INTERNAL MEDICINE

## 2023-01-07 PROCEDURE — 84132 ASSAY OF SERUM POTASSIUM: CPT | Performed by: HOSPITALIST

## 2023-01-07 RX ORDER — ISOSORBIDE MONONITRATE 30 MG/1
15 TABLET, EXTENDED RELEASE ORAL DAILY
Qty: 15 TABLET | Refills: 0 | Status: SHIPPED | OUTPATIENT
Start: 2023-01-07 | End: 2023-01-27

## 2023-01-07 RX ORDER — CLOPIDOGREL BISULFATE 75 MG/1
75 TABLET ORAL DAILY
Qty: 30 TABLET | Refills: 0 | Status: SHIPPED | OUTPATIENT
Start: 2023-01-07 | End: 2023-01-27

## 2023-01-07 RX ORDER — NITROGLYCERIN 0.4 MG/1
TABLET SUBLINGUAL
Qty: 20 TABLET | Refills: 0 | Status: SHIPPED | OUTPATIENT
Start: 2023-01-07 | End: 2023-07-06

## 2023-01-07 RX ORDER — ATORVASTATIN CALCIUM 40 MG/1
40 TABLET, FILM COATED ORAL EVERY EVENING
Qty: 30 TABLET | Refills: 0 | Status: SHIPPED | OUTPATIENT
Start: 2023-01-07 | End: 2023-01-27

## 2023-01-07 RX ORDER — METOPROLOL TARTRATE 25 MG/1
25 TABLET, FILM COATED ORAL 2 TIMES DAILY
Qty: 60 TABLET | Refills: 0 | Status: SHIPPED | OUTPATIENT
Start: 2023-01-07 | End: 2023-01-27

## 2023-01-07 RX ADMIN — CLOPIDOGREL BISULFATE 75 MG: 75 TABLET ORAL at 10:05

## 2023-01-07 RX ADMIN — ASPIRIN 81 MG: 81 TABLET, COATED ORAL at 10:05

## 2023-01-07 RX ADMIN — MULTIPLE VITAMINS W/ MINERALS TAB 1 TABLET: TAB at 10:05

## 2023-01-07 RX ADMIN — METOPROLOL TARTRATE 25 MG: 25 TABLET, FILM COATED ORAL at 10:05

## 2023-01-07 RX ADMIN — ISOSORBIDE MONONITRATE 15 MG: 30 TABLET, EXTENDED RELEASE ORAL at 10:05

## 2023-01-07 ASSESSMENT — ACTIVITIES OF DAILY LIVING (ADL)
ADLS_ACUITY_SCORE: 35

## 2023-01-07 NOTE — PLAN OF CARE
Occupational Therapy Discharge Summary    Reason for therapy discharge:    All goals and outcomes met, no further needs identified.    Progress towards therapy goal(s). See goals on Care Plan in [facility] electronic health record for goal details.  Goals met    Therapy recommendation(s):    Continued therapy is recommended.  Rationale/Recommendations:  To maximize exercise tolerance. OP scheduled at [facility]. Patient declines session this AM in prep for discharge..

## 2023-01-07 NOTE — PLAN OF CARE
Alert and oriented x4. Vital signs stable on RA. Up in chair, moving independent in room. Tolerating regular diet. Lung sounds WNL. Bowel sounds +, + flatus, BM not yet today, adequate urine output. Skin WNL- light bruising on R fore arm- marked. Pain managed with tylenol. Denies nausea. Tele SR  Drips N/A.    Discharge at noon today. Teaching and medications given to patient to take home.

## 2023-01-07 NOTE — PLAN OF CARE
Goal Outcome Evaluation:       Pt. A&O x4. Denies chest pain, SOB or trouble breathing. Stable vitals on RA. Clear lung sounds bilaterally. Regular heart rhythm. Tele- NSR.  Active BS, no BM, passing flatus. Voiding adequately to the bathroom. R groin incision, CDI. Regular diet. Up independently. R forearm, bruising. 2 x PIV, saline locked.

## 2023-01-07 NOTE — DISCHARGE SUMMARY
"[facility]    Discharge Summary  Hospitalist    Date of Admission:  1/4/2023  Date of Discharge:  1/7/2023  Discharging Provider: Ekta Treviño, DO    Discharge Diagnoses   NSTEMI dt SCAD  Dyslipidemia  Obesity     History of Present Illness   Palak Eckert is a 57 year old female with no known PMHx and not on any medications at baseline other than NSAID and MVI who was initially admitted to [facility] on 1/2/2023 for evaluation of chest pain with findings of an NSTEMI. Was seen by cardiology and underwent coronary angiogram on 1/3. Found to have SCAD lesion involving left circumflex. Per Dr. Zelaya, PCI of lesion would be high risk and conservative mgmt was recommended. Appropriate medications were started. Due to involvement of the ostial left circumflex, concerns were raised for development of possible retrograde extension into the left main. It was felt that patient should be transferred to [facility] for ongoing close cardiac monitoring. Was transferred from [facility] to [facility] on 1/4/2023.     Hospital Course   Palak Eckert was admitted on 1/4/2023.  The following problems were addressed during her hospitalization:    NSTEMI dt SCAD  Dyslipidemia  * With presentation and workup at [facility] as above.   * Cardiac cath showed an acute occlusion of the ostial left circumflex, likely due to SCAD.   * Trops peaked at 1500. Echo showed EF 45-50% with severe hypokinesis involving the basal and mid lateral and inferolateral wall.  * Treated medically. Started on new regimen of medications including DAPT with ASA/Plavix, statin, Imdur and metoprolol.    * No recurrent of chest pain during stay at [facility].   * At discharge will cont ASA 81mg daily, atorvastatin 40mg daily, Plavix, Imdur (dose reduced from 30mg daily to 15mg daily during stay dt reported headache) and metoprolol 25mg BID.  * Note recs per cardiology regarding discharge:  \"I cautioned her not to do any " "strenuous exercise or heavy lifting, including no straining on the toilet.  I would recommend a 10 pound lifting restriction for minimum of 6 weeks.  I also recommended that she avoid the emotional stress of work for a period of 4 to 6 weeks. ...  She knows to return to the emergency room by ambulance if she has sustained chest discomfort consistent with her previous angina after returning home\"  * Will continue cardiac rehab at discharge  * OP follow up with Dr. Tee for evaluation of possible fibromuscular dysplasia     Obesity  * BMI 38.9 this stay.   * Encourage diet/lifestyle changes    Ekta Treviño DO    Code Status   Full Code       Primary Care Physician   Ml Paz    Physical Exam   Temp: 98.2  F (36.8  C) Temp src: Oral BP: 112/67 Pulse: 81   Resp: 16 SpO2: 95 % O2 Device: None (Room air)    Vitals:    01/04/23 2354 01/07/23 0500   Weight: 98.4 kg (216 lb 14.4 oz) 98.3 kg (216 lb 12.8 oz)     Vital Signs with Ranges  Temp:  [97.6  F (36.4  C)-98.4  F (36.9  C)] 98.2  F (36.8  C)  Pulse:  [70-86] 81  Resp:  [16-20] 16  BP: ()/(58-79) 112/67  SpO2:  [94 %-97 %] 95 %  I/O last 3 completed shifts:  In: 360 [P.O.:360]  Out: -     General: Resting comfortably, alert, conversive, NAD  CVS: HRRR, no MGR, no LE edema  Respiratory: CTAB, no wheeze/rales/rhonchi, no increased work of breathing  GI: S, NT, ND, +BS  Skin: Warm/dry  Neuro: CNs 2-12 intact, no focal motor/sensory deficits    Discharge Disposition   Discharged to home  Condition at discharge: Stable    Consultations This Hospital Stay   CARDIAC REHAB IP CONSULT  CARDIOLOGY IP CONSULT    Time Spent on this Encounter   Ekta MONTENEGRO DO, personally saw the patient today and spent greater than 30 minutes discharging this patient.    Discharge Orders      Cardiac Rehab Referral      Adult Cardiology Eval  Referral      Reason for your hospital stay    Ongoing monitoring after your spontaneous coronary " artery dissection and stent placement.     Activity    Your activity upon discharge:  no strenuous exercise or heavy lifting, including no straining on the toilet.  10 pound lifting restriction for the next 6 weeks.  I also recommended that she avoid the emotional stress of work for a period of 4 to 6 weeks.     Discharge Instructions    No strenuous exercise or heavy lifting, including no straining on the toilet. 10 pound lifting restriction for the next 6 weeks. Recommendation per cardiology is to avoid the emotional stress of work for a period of 4 to 6 weeks.     Follow-up and recommended labs and tests     Follow up with [facility] in clinic in the next 1-2 weeks.  Follow up with your PCP in the next 2-4 weeks.     Diet    Follow this diet upon discharge: low fat, low cholesterol, <2400mg sodium diet     Discharge Medications   Current Discharge Medication List      START taking these medications    Details   nitroGLYcerin (NITROSTAT) 0.4 MG sublingual tablet For chest pain place 1 tablet under the tongue every 5 minutes for 3 doses. If symptoms persist 5 minutes after 1st dose call 911.  Qty: 20 tablet, Refills: 0    Associated Diagnoses: Coronary artery dissection; NSTEMI (non-ST elevated myocardial infarction) (H)         CONTINUE these medications which have CHANGED    Details   aspirin (ASA) 81 MG EC tablet Take 1 tablet (81 mg) by mouth daily for 30 days  Qty: 30 tablet, Refills: 0    Associated Diagnoses: Coronary artery dissection; NSTEMI (non-ST elevated myocardial infarction) (H)      atorvastatin (LIPITOR) 40 MG tablet Take 1 tablet (40 mg) by mouth every evening for 30 days  Qty: 30 tablet, Refills: 0    Associated Diagnoses: NSTEMI (non-ST elevated myocardial infarction) (H)      clopidogrel (PLAVIX) 75 MG tablet Take 1 tablet (75 mg) by mouth daily for 30 days  Qty: 30 tablet, Refills: 0    Associated Diagnoses: NSTEMI (non-ST elevated myocardial infarction) (H)      isosorbide mononitrate  (IMDUR) 30 MG 24 hr tablet Take 0.5 tablets (15 mg) by mouth daily for 30 days  Qty: 15 tablet, Refills: 0    Associated Diagnoses: Coronary artery dissection; NSTEMI (non-ST elevated myocardial infarction) (H)      metoprolol tartrate (LOPRESSOR) 25 MG tablet Take 1 tablet (25 mg) by mouth 2 times daily for 30 days  Qty: 60 tablet, Refills: 0    Associated Diagnoses: NSTEMI (non-ST elevated myocardial infarction) (H)         CONTINUE these medications which have NOT CHANGED    Details   multivitamin w/minerals (THERA-VIT-M) tablet Take 1 tablet by mouth daily      acetaminophen (TYLENOL) 325 MG tablet Take 2 tablets (650 mg) by mouth every 4 hours as needed for mild pain or headaches  Qty: 90 tablet, Refills: 3    Associated Diagnoses: NSTEMI (non-ST elevated myocardial infarction) (H)           Allergies   Allergies   Allergen Reactions     No Known Drug Allergies      Data   Most Recent 3 CBC's:Recent Labs   Lab Test 01/05/23  0608 01/03/23  0641 01/02/23  0753   WBC 11.2* 15.9* 10.6   HGB 12.4 14.0 14.0   MCV 93 93 97    416 379      Most Recent 3 BMP's:  Recent Labs   Lab Test 01/07/23  0634 01/06/23  0632 01/05/23  0608 01/03/23  0641 01/02/23  0753   NA  --   --  140 139 139   POTASSIUM 4.0 3.8 3.6 4.0 4.6   CHLORIDE  --   --  106 103 103   CO2  --   --  26 23 24   BUN  --   --  13 10.3 10.2   CR  --   --  0.61 0.62 0.70   ANIONGAP  --   --  8 13 12   JARRET  --   --  8.8 9.0 9.1   GLC  --   --  99 117* 150*     Most Recent 2 LFT's:  Recent Labs   Lab Test 01/05/23  0608 01/02/23  1329   AST 47* 37*   ALT 25 21   ALKPHOS 84 110*   BILITOTAL 1.5* 0.5     Most Recent Cholesterol Panel:  Recent Labs   Lab Test 01/03/23  0641   CHOL 159   LDL 89   HDL 44*   TRIG 132     Most Recent TSH, T4 and A1c Labs:  Recent Labs   Lab Test 01/03/23  0641 01/17/22  1601 08/17/17  0941   TSH  --   --  1.49   A1C 5.8*   < >  --     < > = values in this interval not displayed.     Results for orders placed or performed during  the hospital encounter of 23   XR Chest 2 Views    Narrative    CHEST TWO VIEWS  2023 9:15 AM     HISTORY: Chest pain and shortness of breath.    COMPARISON: Chest x-ray on 2011      Impression    IMPRESSION: PA and lateral views of the chest were obtained.  Cardiomediastinal silhouette is within normal limits. No suspicious  focal pulmonary opacities. No significant pleural effusion or  pneumothorax.    EDDIE MONTAÑO MD         SYSTEM ID:  [id number]   Echocardiogram Complete     Value    LVEF  55-60%    Narrative    [id number]  [id number]  [id number]  [id number]^PALAK^ANDREA^HAL     [facility]  Echocardiography Laboratory  [address] [place] [address]     Name: ADDIS GUADARRAMA  MRN: [id number]  : 1965  Study Date: 2023 10:27 AM  Age: 57 yrs  Gender: Female  Patient Location: [facility]  Reason For Study: Syncope  Ordering Physician: ANDREA CID  Performed By: Nathalie Siegel     BSA: 2.1 m2  Height: 67 in  Weight: 221 lb  BP: 121/63 mmHg  ______________________________________________________________________________  Procedure  Complete Portable Echo Adult. Optison (NDC #[id number]) given intravenously.  ______________________________________________________________________________  Interpretation Summary     The left atrium is mildly dilated.  The visual ejection fraction is 55-60%.  There is mild (1+) mitral regurgitation.  The ascending aorta is Mildly dilated.  ______________________________________________________________________________  Left Ventricle  The left ventricle is normal in structure, function and size. Left ventricular  diastolic function is normal. The visual ejection fraction is 55-60%.     Right Ventricle  The right ventricle is normal in structure, function and size.     Atria  The left atrium is mildly dilated. Right atrial size is normal.     Mitral Valve  The mitral valve leaflets are mildly thickened. There is mild (1+) mitral  regurgitation.      Tricuspid Valve  Normal tricuspid valve. There is mild (1+) tricuspid regurgitation. Mild (35-  45mmHg) pulmonary hypertension is present.     Aortic Valve  The aortic valve is normal in structure and function.     Pulmonic Valve  Normal pulmonic valve. There is trace to mild pulmonic valvular regurgitation.     Vessels  Borderline aortic root dilatation. The ascending aorta is Mildly dilated. The  inferior vena cava is normal.     Pericardium  There is no pericardial effusion.     Rhythm  Sinus rhythm was noted.  ______________________________________________________________________________  MMode/2D Measurements & Calculations  IVSd: 0.91 cm  LVIDd: 4.7 cm  LVIDs: 3.5 cm  LVPWd: 0.75 cm  FS: 25.8 %  LV mass(C)d: 128.5 grams  LV mass(C)dI: 60.9 grams/m2  Ao root diam: 3.8 cm  LVOT diam: 2.3 cm  LVOT area: 4.0 cm2  LA Volume (BP): 75.8 ml  LA Volume Index (BP): 35.9 ml/m2  RWT: 0.32     Doppler Measurements & Calculations  MV E max joselyn: 80.9 cm/sec  MV A max joselyn: 41.5 cm/sec  MV E/A: 1.9  MV dec slope: 478.5 cm/sec2  MV dec time: 0.17 sec  MR ERO: 0.06 cm2  MR volume: 8.4 ml  PA acc time: 0.13 sec  TR max joselyn: 232.4 cm/sec  TR max P.6 mmHg  E/E' av.2  Lateral E/e': 6.8  Medial E/e': 7.6     ______________________________________________________________________________  Report approved by: Carroll Jane 2023 11:43 AM         Cardiac Catheterization    Narrative      Left ventricular filling pressures are moderately elevated.    Prox Cx lesion is 100% stenosed.     1, Lcx occluded near origin likely at bifurcation with faint collaterals.   This is c/w SCAD. (LMA, LAD, RCA normal)  2. Echo reports normal LV EF and normal wall motion. LVEDP elevated 27mmHg  REC- avoid intervention if possible, add plavix .CTA or recath in a few   weeks. Also recommend CT arteries-carotid, iliac, renal, femorals etc to   assess for associated FMD (fibromuscular dysplasia)

## 2023-01-11 ENCOUNTER — TELEPHONE (OUTPATIENT)
Dept: CARDIOLOGY | Facility: CLINIC | Age: 58
End: 2023-01-11

## 2023-01-11 NOTE — TELEPHONE ENCOUNTER
"Patient was admitted to [facility] on 1/4/23 after being admitted to [facility] on 1/2/2023 for evaluation of chest pain with NSTEMI. Coronary angiogram at [facility] below. Pt transferred to [facility] for further evaluation.    PMH: obesity    Echo showed EF of 45-50% with severe hypokinesis involving the basal and mid lateral and inferolateral wall.    1/3/23: Coronary angiogram via RFA showed LCFx occluded near origin likely at bifurcation with faint collaterals. This is c/w SCAD. (LMA, LAD, RCA normal).    Pt was started on ASA, Lipitor (HLD dx'd this admission), Plavix, Imdur and NTG at time of discharge. Cardiology cautioned pt not to do \"any strenuous exercise or heavy lifting, including no straining on the toilet. I would recommend a 10 pound lifting restriction for minimum of 6 weeks. I also recommended that she avoid the emotional stress of work for a period of 4 to 6 weeks.\"    Pt is scheduled for an OV on 1/19/23 at 0955 with Dr. Amor at our [place] [facility].    Cardiac rehab is scheduled on 1/19/23 at 1400 in [place].    Writer attempted to call pt, but no answer. VM left to return my phone call. BAILEY Blankenship RN.      "

## 2023-01-13 NOTE — TELEPHONE ENCOUNTER
Pt called back. Pt denies any chest pain, SOB or lightheadedness.    RFA access site is healing without signs of infection, swelling or bleeding.    Reviewed medication changes, and pt denies any questions and states she has an adequate supply of each.    Reviewed cardiac rehab and f/u OV as below. Dr. Acosta's Team RN phone number provided for any questions prior to this appt.    Pt verbalized understanding of all instructions without further questions. BAILEY Blankenship RN.

## 2023-01-19 ENCOUNTER — HOSPITAL ENCOUNTER (OUTPATIENT)
Dept: CARDIOLOGY | Facility: CLINIC | Age: 58
Discharge: HOME | End: 2023-01-19
Attending: INTERNAL MEDICINE | Admitting: INTERNAL MEDICINE
Payer: COMMERCIAL

## 2023-01-19 ENCOUNTER — OFFICE VISIT (OUTPATIENT)
Dept: CARDIOLOGY | Facility: CLINIC | Age: 58
End: 2023-01-19
Attending: INTERNAL MEDICINE
Payer: COMMERCIAL

## 2023-01-19 ENCOUNTER — HOSPITAL ENCOUNTER (OUTPATIENT)
Dept: CARDIAC REHAB | Facility: CLINIC | Age: 58
Discharge: HOME | End: 2023-01-19
Attending: INTERNAL MEDICINE
Payer: COMMERCIAL

## 2023-01-19 VITALS
HEART RATE: 80 BPM | BODY MASS INDEX: 38.5 KG/M2 | HEIGHT: 63 IN | WEIGHT: 217.3 LBS | DIASTOLIC BLOOD PRESSURE: 80 MMHG | SYSTOLIC BLOOD PRESSURE: 138 MMHG

## 2023-01-19 DIAGNOSIS — R00.2 PALPITATIONS: ICD-10-CM

## 2023-01-19 DIAGNOSIS — I21.4 NSTEMI (NON-ST ELEVATED MYOCARDIAL INFARCTION) (H): ICD-10-CM

## 2023-01-19 DIAGNOSIS — I25.42 SPONTANEOUS DISSECTION OF CORONARY ARTERY: Primary | ICD-10-CM

## 2023-01-19 DIAGNOSIS — I25.42 CORONARY ARTERY DISSECTION: ICD-10-CM

## 2023-01-19 PROCEDURE — 93798 PHYS/QHP OP CAR RHAB W/ECG: CPT | Performed by: REHABILITATION PRACTITIONER

## 2023-01-19 PROCEDURE — 93797 PHYS/QHP OP CAR RHAB WO ECG: CPT | Performed by: REHABILITATION PRACTITIONER

## 2023-01-19 PROCEDURE — 99215 OFFICE O/P EST HI 40 MIN: CPT | Performed by: INTERNAL MEDICINE

## 2023-01-19 PROCEDURE — 93242 EXT ECG>48HR<7D RECORDING: CPT

## 2023-01-19 PROCEDURE — 93244 EXT ECG>48HR<7D REV&INTERPJ: CPT | Performed by: INTERNAL MEDICINE

## 2023-01-19 NOTE — LETTER
1/19/2023    Ml Paz, APRN CNP  [address] [place] [address]    RE: Palak CUADRA Babar       Dear Colleague,     I had the pleasure of seeing Palak Eckert in the [facility].  REASON FOR VISIT: Posthospitalization follow-up for recent NSTEMI    HISTORY OF PRESENT ILLNESS: I had the pleasure of seeing Palak Eckert at the [facility] cardiovascular clinic in [place] this morning.  She is a very pleasant 57-year-old female with history of hyperlipidemia and obesity was recently hospitalized with non-ST elevation MI.  She presented to [facility] on January 2 with complaints of chest discomfort.  Her ECG showed nonspecific ST-T changes and her troponin was elevated therefore she proceeded to have coronary angiography.  I personally reviewed the coronary angiography.  It revealed occluded circumflex from the ostium with faint left to left collaterals distally.  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  The left main, LAD and RCA were free of significant disease.  Given concern for possible retrograde progression of the ostial circumflex dissection to the left main, the patient was transferred to [facility] where she was monitored for several more days.  She was eventually discharged on dual antiplatelet therapy, statin and beta-blocker.    She has remained stable since her discharge, however she has been noticing occasional palpitations as well as shortness of breath described as inability to take deep breaths usually at nighttime.  She is not very active and has been resting but with usual activity she does not endorse exertional shortness of breath or chest pain.  No presyncope or syncope.    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  Fortunately, she remains stable from cardiac point of  view.  She does not endorse exertional symptoms at this point.  The inability to take deep breaths at rest which is more common at night could be medication or anxiety related.  It could also be related to arrhythmia such as frequent PVCs.  Nonetheless, I would recommend an echocardiogram to make sure her LV function is preserved and there are no new wall motion normalities.  We will also place her on event monitor for 7 days to rule out any significant arrhythmias.  I instructed her to keep an eye on her symptoms and if she develops frequent dyspnea, chest pain or any symptoms similar to her presentation few weeks ago that she will come to the emergency department.    I reviewed the association between scad and fibromuscular dysplasia as well as intracranial aneurysms.  Therefore we will perform CTA of the head and neck as well as abdomen and pelvis.    Will reach out to her after the event monitor and the echo results become available.  Discussed importance of stress reduction.  She will return to work 6 weeks after her event if she remains stable.    John Amor MD      Today's clinic visit entailed:  45  minutes spent on the date of the encounter doing chart review, history and exam, documentation and further activities per the note  Provider  Link to [facility] Help Grid         Orders Placed This Encounter   Procedures     CTA Head Neck with Contrast     CTA Abdomen Pelvis with Contrast     Follow-Up with Cardiology KAMI     Leadless EKG Monitor 3 to 7 Days     Echocardiogram Complete       No orders of the defined types were placed in this encounter.      There are no discontinued medications.      Encounter Diagnoses   Name Primary?     Coronary artery dissection      NSTEMI (non-ST elevated myocardial infarction) (H)      Spontaneous dissection of coronary artery Yes     Palpitations        CURRENT MEDICATIONS:  Current Outpatient Medications   Medication Sig Dispense Refill     acetaminophen (TYLENOL) 325 MG  tablet Take 2 tablets (650 mg) by mouth every 4 hours as needed for mild pain or headaches 90 tablet 3     aspirin (ASA) 81 MG EC tablet Take 1 tablet (81 mg) by mouth daily for 30 days 30 tablet 0     atorvastatin (LIPITOR) 40 MG tablet Take 1 tablet (40 mg) by mouth every evening for 30 days 30 tablet 0     clopidogrel (PLAVIX) 75 MG tablet Take 1 tablet (75 mg) by mouth daily for 30 days 30 tablet 0     isosorbide mononitrate (IMDUR) 30 MG 24 hr tablet Take 0.5 tablets (15 mg) by mouth daily for 30 days 15 tablet 0     metoprolol tartrate (LOPRESSOR) 25 MG tablet Take 1 tablet (25 mg) by mouth 2 times daily for 30 days 60 tablet 0     multivitamin w/minerals (THERA-VIT-M) tablet Take 1 tablet by mouth daily       nitroGLYcerin (NITROSTAT) 0.4 MG sublingual tablet For chest pain place 1 tablet under the tongue every 5 minutes for 3 doses. If symptoms persist 5 minutes after 1st dose call 911. 20 tablet 0       ALLERGIES     Allergies   Allergen Reactions     No Known Drug Allergies        PAST MEDICAL HISTORY:  Past Medical History:   Diagnosis Date     Coronary artery dissection 01/03/2023    Left circumflex     Unspecified sinusitis (chronic)        PAST SURGICAL HISTORY:  Past Surgical History:   Procedure Laterality Date     COLONOSCOPY  8/11/2015    Dr. No [facility]     COLONOSCOPY N/A 8/11/2015    Procedure: COLONOSCOPY;  Surgeon: Jake No MD;  Location:  GI     CV CORONARY ANGIOGRAM N/A 1/3/2023    Procedure: Coronary Angiogram;  Surgeon: Chong Schmitz MD;  Location:  HEART CARDIAC CATH LAB     CV LEFT HEART CATH N/A 1/3/2023    Procedure: Left Heart Catheterization;  Surgeon: Chong Schmitz MD;  Location:  HEART CARDIAC CATH LAB     [facility] NONSPECIFIC PROCEDURE  2001    sinus op       FAMILY HISTORY:  Family History   Problem Relation Age of Onset     Cancer Father         kidney     Respiratory Father         emphysema     Diabetes Father      Colon Cancer No family hx of   "      SOCIAL HISTORY:  Social History     Socioeconomic History     Marital status:      Spouse name: Willie     Number of children: 0     Years of education: 16     Highest education level: None   Occupational History     Occupation: teacher   Tobacco Use     Smoking status: Never     Smokeless tobacco: Never   Substance and Sexual Activity     Alcohol use: No     Comment: maybe 1 time per year     Drug use: No     Sexual activity: Yes     Partners: Male     Social Determinants of Health     Financial Resource Strain: Low Risk      Difficulty of Paying Living Expenses: Not very hard   Food Insecurity: No Food Insecurity     Worried About Running Out of Food in the Last Year: Never true     Ran Out of Food in the Last Year: Never true   Transportation Needs: No Transportation Needs     Lack of Transportation (Medical): No     Lack of Transportation (Non-Medical): No       Review of Systems:  Skin:          Eyes:  Positive for glasses    ENT:  Negative      Respiratory:  Positive for dyspnea on exertion;shortness of breath     Cardiovascular:  Negative      Gastroenterology: Negative      Genitourinary:  Negative      Musculoskeletal:  Negative      Neurologic:  Negative      Psychiatric:  Negative      Heme/Lymph/Imm:  Negative      Endocrine:  Negative        Physical Exam:  Vitals: /80 (BP Location: Right arm, Patient Position: Sitting, Cuff Size: Adult Regular)   Pulse 80   Ht 1.6 m (5' 3\")   Wt 98.6 kg (217 lb 4.8 oz)   LMP  (LMP Unknown)   BMI 38.49 kg/m      Constitutional:  cooperative;in no acute distress        Skin:  warm and dry to the touch          Head:  normocephalic        Eyes:  conjunctivae and lids unremarkable        Lymph:      ENT:  no pallor or cyanosis        Neck:  JVP normal        Respiratory:  clear to auscultation         Cardiac: regular rhythm;no murmurs, gallops or rubs detected                pulses full and equal, no bruits auscultated                                 "        GI:  abdomen soft;non-tender        Extremities and Muscular Skeletal:  no edema              Neurological:  no gross motor deficits        Psych:  Alert and Oriented x 3        CC  Ekta Treviño, DO  [address] [place] [address]    Thank you for allowing me to participate in the care of your patient.      Sincerely,     John Amor MD, MD      [facility]

## 2023-01-19 NOTE — PROGRESS NOTES
REASON FOR VISIT: Posthospitalization follow-up for recent NSTEMI    HISTORY OF PRESENT ILLNESS: I had the pleasure of seeing Palak Eckert at the [facility] cardiovascular clinic in [place] this morning.  She is a very pleasant 57-year-old female with history of hyperlipidemia and obesity was recently hospitalized with non-ST elevation MI.  She presented to [facility] on January 2 with complaints of chest discomfort.  Her ECG showed nonspecific ST-T changes and her troponin was elevated therefore she proceeded to have coronary angiography.  I personally reviewed the coronary angiography.  It revealed occluded circumflex from the ostium with faint left to left collaterals distally.  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  The left main, LAD and RCA were free of significant disease.  Given concern for possible retrograde progression of the ostial circumflex dissection to the left main, the patient was transferred to [facility] where she was monitored for several more days.  She was eventually discharged on dual antiplatelet therapy, statin and beta-blocker.    She has remained stable since her discharge, however she has been noticing occasional palpitations as well as shortness of breath described as inability to take deep breaths usually at nighttime.  She is not very active and has been resting but with usual activity she does not endorse exertional shortness of breath or chest pain.  No presyncope or syncope.    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  Fortunately, she remains stable from cardiac point of view.  She does not endorse exertional symptoms at this point.  The inability to take deep breaths at rest which is more common at night could be medication or anxiety related.  It could also be related to arrhythmia such as  frequent PVCs.  Nonetheless, I would recommend an echocardiogram to make sure her LV function is preserved and there are no new wall motion normalities.  We will also place her on event monitor for 7 days to rule out any significant arrhythmias.  I instructed her to keep an eye on her symptoms and if she develops frequent dyspnea, chest pain or any symptoms similar to her presentation few weeks ago that she will come to the emergency department.    I reviewed the association between scad and fibromuscular dysplasia as well as intracranial aneurysms.  Therefore we will perform CTA of the head and neck as well as abdomen and pelvis.    Will reach out to her after the event monitor and the echo results become available.  Discussed importance of stress reduction.  She will return to work 6 weeks after her event if she remains stable.    John Amor MD      Today's clinic visit entailed:  45  minutes spent on the date of the encounter doing chart review, history and exam, documentation and further activities per the note  Provider  Link to [facility] Help Grid         Orders Placed This Encounter   Procedures     CTA Head Neck with Contrast     CTA Abdomen Pelvis with Contrast     Follow-Up with Cardiology KAMI     Leadless EKG Monitor 3 to 7 Days     Echocardiogram Complete       No orders of the defined types were placed in this encounter.      There are no discontinued medications.      Encounter Diagnoses   Name Primary?     Coronary artery dissection      NSTEMI (non-ST elevated myocardial infarction) (H)      Spontaneous dissection of coronary artery Yes     Palpitations        CURRENT MEDICATIONS:  Current Outpatient Medications   Medication Sig Dispense Refill     acetaminophen (TYLENOL) 325 MG tablet Take 2 tablets (650 mg) by mouth every 4 hours as needed for mild pain or headaches 90 tablet 3     aspirin (ASA) 81 MG EC tablet Take 1 tablet (81 mg) by mouth daily for 30 days 30 tablet 0     atorvastatin (LIPITOR) 40 MG  tablet Take 1 tablet (40 mg) by mouth every evening for 30 days 30 tablet 0     clopidogrel (PLAVIX) 75 MG tablet Take 1 tablet (75 mg) by mouth daily for 30 days 30 tablet 0     isosorbide mononitrate (IMDUR) 30 MG 24 hr tablet Take 0.5 tablets (15 mg) by mouth daily for 30 days 15 tablet 0     metoprolol tartrate (LOPRESSOR) 25 MG tablet Take 1 tablet (25 mg) by mouth 2 times daily for 30 days 60 tablet 0     multivitamin w/minerals (THERA-VIT-M) tablet Take 1 tablet by mouth daily       nitroGLYcerin (NITROSTAT) 0.4 MG sublingual tablet For chest pain place 1 tablet under the tongue every 5 minutes for 3 doses. If symptoms persist 5 minutes after 1st dose call 911. 20 tablet 0       ALLERGIES     Allergies   Allergen Reactions     No Known Drug Allergies        PAST MEDICAL HISTORY:  Past Medical History:   Diagnosis Date     Coronary artery dissection 01/03/2023    Left circumflex     Unspecified sinusitis (chronic)        PAST SURGICAL HISTORY:  Past Surgical History:   Procedure Laterality Date     COLONOSCOPY  8/11/2015    Dr. No [facility]     COLONOSCOPY N/A 8/11/2015    Procedure: COLONOSCOPY;  Surgeon: Jake No MD;  Location:  GI     CV CORONARY ANGIOGRAM N/A 1/3/2023    Procedure: Coronary Angiogram;  Surgeon: Chong Schmitz MD;  Location:  HEART CARDIAC CATH LAB     CV LEFT HEART CATH N/A 1/3/2023    Procedure: Left Heart Catheterization;  Surgeon: Chong Schmitz MD;  Location:  HEART CARDIAC CATH LAB     [facility] NONSPECIFIC PROCEDURE  2001    sinus op       FAMILY HISTORY:  Family History   Problem Relation Age of Onset     Cancer Father         kidney     Respiratory Father         emphysema     Diabetes Father      Colon Cancer No family hx of        SOCIAL HISTORY:  Social History     Socioeconomic History     Marital status:      Spouse name: Bill     Number of children: 0     Years of education: 16     Highest education level: None   Occupational History     Occupation:  "teacher   Tobacco Use     Smoking status: Never     Smokeless tobacco: Never   Substance and Sexual Activity     Alcohol use: No     Comment: maybe 1 time per year     Drug use: No     Sexual activity: Yes     Partners: Male     Social Determinants of Health     Financial Resource Strain: Low Risk      Difficulty of Paying Living Expenses: Not very hard   Food Insecurity: No Food Insecurity     Worried About Running Out of Food in the Last Year: Never true     Ran Out of Food in the Last Year: Never true   Transportation Needs: No Transportation Needs     Lack of Transportation (Medical): No     Lack of Transportation (Non-Medical): No       Review of Systems:  Skin:          Eyes:  Positive for glasses    ENT:  Negative      Respiratory:  Positive for dyspnea on exertion;shortness of breath     Cardiovascular:  Negative      Gastroenterology: Negative      Genitourinary:  Negative      Musculoskeletal:  Negative      Neurologic:  Negative      Psychiatric:  Negative      Heme/Lymph/Imm:  Negative      Endocrine:  Negative        Physical Exam:  Vitals: /80 (BP Location: Right arm, Patient Position: Sitting, Cuff Size: Adult Regular)   Pulse 80   Ht 1.6 m (5' 3\")   Wt 98.6 kg (217 lb 4.8 oz)   LMP  (LMP Unknown)   BMI 38.49 kg/m      Constitutional:  cooperative;in no acute distress        Skin:  warm and dry to the touch          Head:  normocephalic        Eyes:  conjunctivae and lids unremarkable        Lymph:      ENT:  no pallor or cyanosis        Neck:  JVP normal        Respiratory:  clear to auscultation         Cardiac: regular rhythm;no murmurs, gallops or rubs detected                pulses full and equal, no bruits auscultated                                        GI:  abdomen soft;non-tender        Extremities and Muscular Skeletal:  no edema              Neurological:  no gross motor deficits        Psych:  Alert and Oriented x 3        CC  Ekta Treviño, DO  [address] [place] [address]

## 2023-01-23 ENCOUNTER — HOSPITAL ENCOUNTER (OUTPATIENT)
Dept: CARDIAC REHAB | Facility: CLINIC | Age: 58
Discharge: HOME | End: 2023-01-23
Attending: INTERNAL MEDICINE
Payer: COMMERCIAL

## 2023-01-23 PROCEDURE — 93798 PHYS/QHP OP CAR RHAB W/ECG: CPT

## 2023-01-26 ENCOUNTER — HOSPITAL ENCOUNTER (OUTPATIENT)
Dept: CARDIAC REHAB | Facility: CLINIC | Age: 58
Discharge: HOME | End: 2023-01-26
Attending: INTERNAL MEDICINE
Payer: COMMERCIAL

## 2023-01-26 PROCEDURE — 93798 PHYS/QHP OP CAR RHAB W/ECG: CPT

## 2023-01-27 ENCOUNTER — HOSPITAL ENCOUNTER (OUTPATIENT)
Dept: CARDIOLOGY | Facility: CLINIC | Age: 58
Discharge: HOME | End: 2023-01-27
Attending: INTERNAL MEDICINE | Admitting: INTERNAL MEDICINE
Payer: COMMERCIAL

## 2023-01-27 DIAGNOSIS — I25.42 CORONARY ARTERY DISSECTION: ICD-10-CM

## 2023-01-27 DIAGNOSIS — I21.4 NSTEMI (NON-ST ELEVATED MYOCARDIAL INFARCTION) (H): ICD-10-CM

## 2023-01-27 DIAGNOSIS — I25.42 SPONTANEOUS DISSECTION OF CORONARY ARTERY: ICD-10-CM

## 2023-01-27 LAB — LVEF ECHO: NORMAL

## 2023-01-27 PROCEDURE — 93308 TTE F-UP OR LMTD: CPT | Mod: 26 | Performed by: INTERNAL MEDICINE

## 2023-01-27 PROCEDURE — 93321 DOPPLER ECHO F-UP/LMTD STD: CPT | Mod: 26 | Performed by: INTERNAL MEDICINE

## 2023-01-27 PROCEDURE — C8924 2D TTE W OR W/O FOL W/CON,FU: HCPCS

## 2023-01-27 PROCEDURE — 93325 DOPPLER ECHO COLOR FLOW MAPG: CPT | Mod: 26 | Performed by: INTERNAL MEDICINE

## 2023-01-27 PROCEDURE — 93325 DOPPLER ECHO COLOR FLOW MAPG: CPT

## 2023-01-27 PROCEDURE — 255N000002 HC RX 255 OP 636: Performed by: INTERNAL MEDICINE

## 2023-01-27 RX ORDER — METOPROLOL TARTRATE 25 MG/1
25 TABLET, FILM COATED ORAL 2 TIMES DAILY
Qty: 90 TABLET | Refills: 3 | Status: SHIPPED | OUTPATIENT
Start: 2023-01-27 | End: 2023-08-25

## 2023-01-27 RX ORDER — CLOPIDOGREL BISULFATE 75 MG/1
75 TABLET ORAL DAILY
Qty: 90 TABLET | Refills: 3 | Status: SHIPPED | OUTPATIENT
Start: 2023-01-27 | End: 2023-02-28

## 2023-01-27 RX ORDER — ISOSORBIDE MONONITRATE 30 MG/1
15 TABLET, EXTENDED RELEASE ORAL DAILY
Qty: 45 TABLET | Refills: 3 | Status: SHIPPED | OUTPATIENT
Start: 2023-01-27 | End: 2023-02-28

## 2023-01-27 RX ADMIN — HUMAN ALBUMIN MICROSPHERES AND PERFLUTREN 3 ML: 10; .22 INJECTION, SOLUTION INTRAVENOUS at 10:06

## 2023-01-27 NOTE — TELEPHONE ENCOUNTER
Pt walked into clinic after TTE to request refills on medications and return to work letter. Pt reports she was advised at the time of her last OV she did not need to continue the atorvastatin. Questions reviewed with Dr. Amor as he was in clinic at time of walk in.     OK for completion of work letter with instructions for return 6 weeks post hospital discharge. return at half work days for 2 weeks and then return to full capacity thereafter.     Refills of all medications sent to pt preferred pharmacy. Atorvastatin taken off med list as pt wishes to discontinue.     Pt provided with provider team number for further questions / concerns.   DAVID Martin RN, BSN. 01/27/23 10:52 AM

## 2023-01-30 ENCOUNTER — HOSPITAL ENCOUNTER (OUTPATIENT)
Dept: CARDIAC REHAB | Facility: CLINIC | Age: 58
Discharge: HOME | End: 2023-01-30
Attending: INTERNAL MEDICINE
Payer: COMMERCIAL

## 2023-01-30 PROCEDURE — 93798 PHYS/QHP OP CAR RHAB W/ECG: CPT

## 2023-01-31 ENCOUNTER — HOSPITAL ENCOUNTER (OUTPATIENT)
Dept: CARDIAC REHAB | Facility: CLINIC | Age: 58
Discharge: HOME | End: 2023-01-31
Attending: INTERNAL MEDICINE
Payer: COMMERCIAL

## 2023-01-31 PROCEDURE — 93798 PHYS/QHP OP CAR RHAB W/ECG: CPT | Performed by: REHABILITATION PRACTITIONER

## 2023-02-03 ENCOUNTER — HOSPITAL ENCOUNTER (OUTPATIENT)
Dept: CARDIAC REHAB | Facility: CLINIC | Age: 58
Discharge: HOME | End: 2023-02-03
Attending: INTERNAL MEDICINE
Payer: COMMERCIAL

## 2023-02-03 PROCEDURE — 93798 PHYS/QHP OP CAR RHAB W/ECG: CPT

## 2023-02-07 ENCOUNTER — HOSPITAL ENCOUNTER (OUTPATIENT)
Dept: CARDIAC REHAB | Facility: CLINIC | Age: 58
Discharge: HOME | End: 2023-02-07
Attending: INTERNAL MEDICINE
Payer: COMMERCIAL

## 2023-02-07 PROCEDURE — 93798 PHYS/QHP OP CAR RHAB W/ECG: CPT

## 2023-02-08 ENCOUNTER — HOSPITAL ENCOUNTER (OUTPATIENT)
Dept: CT IMAGING | Facility: CLINIC | Age: 58
Discharge: HOME | End: 2023-02-08
Attending: INTERNAL MEDICINE | Admitting: INTERNAL MEDICINE
Payer: COMMERCIAL

## 2023-02-08 DIAGNOSIS — I25.42 SPONTANEOUS DISSECTION OF CORONARY ARTERY: ICD-10-CM

## 2023-02-08 PROCEDURE — 70498 CT ANGIOGRAPHY NECK: CPT

## 2023-02-08 PROCEDURE — 258N000003 HC RX IP 258 OP 636: Performed by: RADIOLOGY

## 2023-02-08 PROCEDURE — 250N000011 HC RX IP 250 OP 636: Performed by: RADIOLOGY

## 2023-02-08 PROCEDURE — 70496 CT ANGIOGRAPHY HEAD: CPT

## 2023-02-08 RX ORDER — IOPAMIDOL 755 MG/ML
500 INJECTION, SOLUTION INTRAVASCULAR ONCE
Status: COMPLETED | OUTPATIENT
Start: 2023-02-08 | End: 2023-02-08

## 2023-02-08 RX ADMIN — IOPAMIDOL 75 ML: 755 INJECTION, SOLUTION INTRAVENOUS at 11:29

## 2023-02-08 RX ADMIN — SODIUM CHLORIDE 80 ML: 9 INJECTION, SOLUTION INTRAVENOUS at 11:29

## 2023-02-09 ENCOUNTER — TELEPHONE (OUTPATIENT)
Dept: CARDIOLOGY | Facility: CLINIC | Age: 58
End: 2023-02-09

## 2023-02-09 ENCOUNTER — HOSPITAL ENCOUNTER (OUTPATIENT)
Dept: CARDIAC REHAB | Facility: CLINIC | Age: 58
Discharge: HOME | End: 2023-02-09
Attending: INTERNAL MEDICINE
Payer: COMMERCIAL

## 2023-02-09 PROCEDURE — 93798 PHYS/QHP OP CAR RHAB W/ECG: CPT

## 2023-02-09 NOTE — TELEPHONE ENCOUNTER
Pt had visit with Dr. 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  Fortunately, she remains stable from cardiac point of view.  She does not endorse exertional symptoms at this point.  The inability to take deep breaths at rest which is more common at night could be medication or anxiety related.  It could also be related to arrhythmia such as frequent PVCs.  Nonetheless, I would recommend an echocardiogram to make sure her LV function is preserved and there are no new wall motion normalities.  We will also place her on event monitor for 7 days to rule out any significant arrhythmias.  I instructed her to keep an eye on her symptoms and if she develops frequent dyspnea, chest pain or any symptoms similar to her presentation few weeks ago that she will come to the emergency department.     I reviewed the association between scad and fibromuscular dysplasia as well as intracranial aneurysms.  Therefore we will perform CTA of the head and neck as well as abdomen and pelvis.     Will reach out to her after the event monitor and the echo results become available.  Discussed importance of stress reduction.  She will return to work 6 weeks after her event if she remains stable.  ---------------------------------------------------------------------    CTA of head & neck :  IMPRESSION:  1. No high-grade stenosis or large vessel occlusion involving the  major intracranial arteries.  2. No intracranial aneurysm or high-flow vascular malformation  identified.  3. No significant stenosis, occlusion, or dissection of the cervical  carotid or vertebral arteries.  4. Mild atherosclerosis of the right carotid bifurcation without  significant stenosis.    7 day zio patch showed 2 episodes of SVT - 4 beats or more (HR range 106-182, avg  bpm)    Echo showed :  Interpretation Summary  Left ventricular systolic  function is normal. The visual ejection fraction is  55-60%. Borderline mild basal inferolateral hypokinesis.  The right ventricle is normal in structure, function and size.  There is mild (1+) mitral regurgitation.  The inferior vena cava was normal in size with preserved respiratory  variability.  The ascending aorta is Mildly dilated. Max diameter of the visualized portion  4 cm.  There is no pericardial effusion.     This study was compared to a TTE from 1/3/2023. There has been no significant change.       CTA of abdomen & pelvis :  IMPRESSION: No evidence for FMD in the sampled arteries of the abdomen  and pelvis.

## 2023-02-15 ENCOUNTER — HOSPITAL ENCOUNTER (OUTPATIENT)
Dept: CARDIAC REHAB | Facility: CLINIC | Age: 58
Discharge: HOME | End: 2023-02-15
Attending: INTERNAL MEDICINE
Payer: COMMERCIAL

## 2023-02-15 PROCEDURE — 93798 PHYS/QHP OP CAR RHAB W/ECG: CPT | Performed by: REHABILITATION PRACTITIONER

## 2023-02-17 ENCOUNTER — HOSPITAL ENCOUNTER (OUTPATIENT)
Dept: CARDIAC REHAB | Facility: CLINIC | Age: 58
Discharge: HOME | End: 2023-02-17
Attending: INTERNAL MEDICINE
Payer: COMMERCIAL

## 2023-02-17 ENCOUNTER — HOSPITAL ENCOUNTER (OUTPATIENT)
Dept: CT IMAGING | Facility: CLINIC | Age: 58
Discharge: HOME | End: 2023-02-17
Attending: INTERNAL MEDICINE | Admitting: INTERNAL MEDICINE
Payer: COMMERCIAL

## 2023-02-17 DIAGNOSIS — I25.42 SPONTANEOUS DISSECTION OF CORONARY ARTERY: ICD-10-CM

## 2023-02-17 PROCEDURE — 250N000011 HC RX IP 250 OP 636: Performed by: INTERNAL MEDICINE

## 2023-02-17 PROCEDURE — 93798 PHYS/QHP OP CAR RHAB W/ECG: CPT

## 2023-02-17 PROCEDURE — 74174 CTA ABD&PLVS W/CONTRAST: CPT

## 2023-02-17 PROCEDURE — 250N000009 HC RX 250: Performed by: INTERNAL MEDICINE

## 2023-02-17 RX ORDER — IOPAMIDOL 755 MG/ML
500 INJECTION, SOLUTION INTRAVASCULAR ONCE
Status: COMPLETED | OUTPATIENT
Start: 2023-02-17 | End: 2023-02-17

## 2023-02-17 RX ADMIN — IOPAMIDOL 80 ML: 755 INJECTION, SOLUTION INTRAVENOUS at 09:09

## 2023-02-17 RX ADMIN — SODIUM CHLORIDE 80 ML: 9 INJECTION, SOLUTION INTRAVENOUS at 09:09

## 2023-02-20 ENCOUNTER — HOSPITAL ENCOUNTER (OUTPATIENT)
Dept: CARDIAC REHAB | Facility: CLINIC | Age: 58
Discharge: HOME | End: 2023-02-20
Attending: INTERNAL MEDICINE
Payer: COMMERCIAL

## 2023-02-20 PROCEDURE — 93797 PHYS/QHP OP CAR RHAB WO ECG: CPT

## 2023-02-20 PROCEDURE — 93798 PHYS/QHP OP CAR RHAB W/ECG: CPT

## 2023-02-28 ENCOUNTER — OFFICE VISIT (OUTPATIENT)
Dept: CARDIOLOGY | Facility: CLINIC | Age: 58
End: 2023-02-28
Payer: COMMERCIAL

## 2023-02-28 VITALS
SYSTOLIC BLOOD PRESSURE: 110 MMHG | OXYGEN SATURATION: 94 % | WEIGHT: 217.6 LBS | HEART RATE: 77 BPM | HEIGHT: 63 IN | DIASTOLIC BLOOD PRESSURE: 80 MMHG | BODY MASS INDEX: 38.55 KG/M2

## 2023-02-28 DIAGNOSIS — I25.42 SPONTANEOUS DISSECTION OF CORONARY ARTERY: Primary | ICD-10-CM

## 2023-02-28 DIAGNOSIS — D18.00 HEMANGIOMA, UNSPECIFIED SITE: ICD-10-CM

## 2023-02-28 PROCEDURE — 99214 OFFICE O/P EST MOD 30 MIN: CPT | Performed by: INTERNAL MEDICINE

## 2023-02-28 NOTE — PROGRESS NOTES
HPI and Plan:   REASON FOR VISIT: Follow-up for recent NSTEMI due to SCAD    HISTORY OF PRESENT ILLNESS: I had the pleasure of seeing Palak Eckert at the [facility] cardiovascular clinic in [place] this morning.  She is a very pleasant 57-year-old female with history of hyperlipidemia and obesity was recently hospitalized with non-ST elevation MI.  She presented to [facility] on January  with complaints of chest discomfort.  Her ECG showed nonspecific ST-T changes and her troponin was elevated therefore she proceeded to have coronary angiography.  I personally reviewed the coronary angiography.  It revealed occluded circumflex from the ostium with faint left to left collaterals distally.  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  The left main, LAD and RCA were free of significant disease.  Given concern for possible retrograde progression of the ostial circumflex dissection to the left main, the patient was transferred to [facility] where she was monitored for several more days.  She was eventually discharged on dual antiplatelet therapy, statin and beta-blocker.    Since our last visit in late January the patient has remained stable from cardiac point of view.  She had couple episodes of diffuse flushing from head to toe as well as scalp tenderness.  The episodes lasted seconds.  She also had occasional lightheaded spells.  She has been reading about scad and is wondering whether she should be on some of the current medications.  No chest pain, palpitations, presyncope or syncope.    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  She remains quite stable from cardiac point of view.  She recently had CTA of the head, chest, abdomen and pelvis.  I reviewed the CT findings with her.  No evidence of FMD in her carotid arteries or renal  arteries.  Also no evidence of intracranial aneurysms.  The CT noted incidentally a lesion in her L3 which could be hemangioma.  The recent event monitor was also reviewed and it showed no evidence of significant arrhythmias.    The patient's flushed episodes could be secondary to Imdur.  Therefore we will discontinue Imdur.  We discussed therapeutic management of scad including dual antiplatelet therapy.  I told the patient there is no consensus on whether patients like her should be on antiplatelet therapy.  Some advocate short-term aspirin and Plavix although the benefit of this is not clear.  We will stop the Plavix and continue aspirin for now.  She will continue metoprolol as she has history of hypertension per her report but this medication can also be discontinued in the near future.    I encouraged her to participate in scad registry at the [facility].  We will see in approximately 6 months for follow-up but sooner if she develops symptoms      John Amor MD    Today's clinic visit entailed:  30 minutes spent on the date of the encounter doing chart review, history and exam, documentation and further activities per the note  Provider  Link to [facility] Help Grid     Orders Placed This Encounter   Procedures     Adult Neurology  Referral     Follow-Up with Cardiology KAMI       No orders of the defined types were placed in this encounter.      Medications Discontinued During This Encounter   Medication Reason     isosorbide mononitrate (IMDUR) 30 MG 24 hr tablet      clopidogrel (PLAVIX) 75 MG tablet          Encounter Diagnoses   Name Primary?     Hemangioma, unspecified site      Spontaneous dissection of coronary artery Yes       CURRENT MEDICATIONS:  Current Outpatient Medications   Medication Sig Dispense Refill     aspirin (ASA) 81 MG EC tablet Take 1 tablet (81 mg) by mouth daily 90 tablet 3     metoprolol tartrate (LOPRESSOR) 25 MG tablet Take 1 tablet (25 mg) by mouth 2 times daily 90 tablet 3      acetaminophen (TYLENOL) 325 MG tablet Take 2 tablets (650 mg) by mouth every 4 hours as needed for mild pain or headaches (Patient not taking: Reported on 2/28/2023) 90 tablet 3     multivitamin w/minerals (THERA-VIT-M) tablet Take 1 tablet by mouth daily (Patient not taking: Reported on 2/28/2023)       nitroGLYcerin (NITROSTAT) 0.4 MG sublingual tablet For chest pain place 1 tablet under the tongue every 5 minutes for 3 doses. If symptoms persist 5 minutes after 1st dose call 911. (Patient not taking: Reported on 2/28/2023) 20 tablet 0       ALLERGIES     Allergies   Allergen Reactions     No Known Drug Allergies        PAST MEDICAL HISTORY:  Past Medical History:   Diagnosis Date     Coronary artery dissection 01/03/2023    Left circumflex     Unspecified sinusitis (chronic)        PAST SURGICAL HISTORY:  Past Surgical History:   Procedure Laterality Date     [facility]ONOSCOPY  8/11/2015    [facility]     COLONOSCOPY N/A 8/11/2015    Procedure: COLONOSCOPY;  Surgeon: Jake No MD;  Location:  GI     CV CORONARY ANGIOGRAM N/A 1/3/2023    Procedure: Coronary Angiogram;  Surgeon: Chong Schmitz MD;  Location:  HEART CARDIAC CATH LAB     CV LEFT HEART CATH N/A 1/3/2023    Procedure: Left Heart Catheterization;  Surgeon: Chong Schmitz MD;  Location:  HEART CARDIAC CATH LAB     [facility] NONSPECIFIC PROCEDURE  2001    sinus op       FAMILY HISTORY:  Family History   Problem Relation Age of Onset     Cancer Father         kidney     Respiratory Father         emphysema     Diabetes Father      Colon Cancer No family hx of        SOCIAL HISTORY:  Social History     Socioeconomic History     Marital status:      Spouse name: Bill     Number of children: 0     Years of education: 16     Highest education level: None   Occupational History     Occupation: teacher   Tobacco Use     Smoking status: Never     Smokeless tobacco: Never   Substance and Sexual Activity     Alcohol use: No     Comment:  "maybe 1 time per year     Drug use: No     Sexual activity: Yes     Partners: Male     Social Determinants of Health     Financial Resource Strain: Low Risk      Difficulty of Paying Living Expenses: Not very hard   Food Insecurity: No Food Insecurity     Worried About Running Out of Food in the Last Year: Never true     Ran Out of Food in the Last Year: Never true   Transportation Needs: No Transportation Needs     Lack of Transportation (Medical): No     Lack of Transportation (Non-Medical): No       Review of Systems:  Skin:  not assessed       Eyes:  not assessed      ENT:  not assessed      Respiratory:  Negative       Cardiovascular:    lightheadedness;Positive for    Gastroenterology: not assessed      Genitourinary:  not assessed      Musculoskeletal:  not assessed      Neurologic:  not assessed      Psychiatric:  not assessed      Heme/Lymph/Imm:  not assessed      Endocrine:  not assessed        Physical Exam:  Vitals: /80 (BP Location: Right arm, Patient Position: Sitting, Cuff Size: Adult Large)   Pulse 77   Ht 1.6 m (5' 3\")   Wt 98.7 kg (217 lb 9.6 oz)   LMP  (LMP Unknown)   SpO2 94%   BMI 38.55 kg/m      Constitutional:  cooperative;in no acute distress        Skin:  warm and dry to the touch          Head:  normocephalic        Eyes:  conjunctivae and lids unremarkable        Lymph:      ENT:  no pallor or cyanosis        Neck:  JVP normal        Respiratory:  clear to auscultation         Cardiac: regular rhythm;no murmurs, gallops or rubs detected                pulses full and equal, no bruits auscultated                                        GI:  abdomen soft;non-tender        Extremities and Muscular Skeletal:  no edema              Neurological:  no gross motor deficits        Psych:  Alert and Oriented x 3        CC  No referring provider defined for this encounter.              "

## 2023-03-02 ENCOUNTER — HOSPITAL ENCOUNTER (OUTPATIENT)
Dept: CARDIAC REHAB | Facility: CLINIC | Age: 58
Discharge: HOME | End: 2023-03-02
Attending: INTERNAL MEDICINE
Payer: COMMERCIAL

## 2023-03-02 PROCEDURE — 93798 PHYS/QHP OP CAR RHAB W/ECG: CPT

## 2023-03-07 ENCOUNTER — HOSPITAL ENCOUNTER (OUTPATIENT)
Dept: CARDIAC REHAB | Facility: CLINIC | Age: 58
Discharge: HOME | End: 2023-03-07
Attending: INTERNAL MEDICINE
Payer: COMMERCIAL

## 2023-03-07 PROCEDURE — 93798 PHYS/QHP OP CAR RHAB W/ECG: CPT | Performed by: REHABILITATION PRACTITIONER

## 2023-03-09 ENCOUNTER — HOSPITAL ENCOUNTER (OUTPATIENT)
Dept: CARDIAC REHAB | Facility: CLINIC | Age: 58
Discharge: HOME | End: 2023-03-09
Attending: INTERNAL MEDICINE
Payer: COMMERCIAL

## 2023-03-09 PROCEDURE — 93798 PHYS/QHP OP CAR RHAB W/ECG: CPT

## 2023-03-14 ENCOUNTER — HOSPITAL ENCOUNTER (OUTPATIENT)
Dept: CARDIAC REHAB | Facility: CLINIC | Age: 58
Discharge: HOME | End: 2023-03-14
Attending: INTERNAL MEDICINE
Payer: COMMERCIAL

## 2023-03-14 PROCEDURE — 93798 PHYS/QHP OP CAR RHAB W/ECG: CPT

## 2023-03-16 ENCOUNTER — HOSPITAL ENCOUNTER (OUTPATIENT)
Dept: CARDIAC REHAB | Facility: CLINIC | Age: 58
Discharge: HOME | End: 2023-03-16
Attending: INTERNAL MEDICINE
Payer: COMMERCIAL

## 2023-03-16 PROCEDURE — 93798 PHYS/QHP OP CAR RHAB W/ECG: CPT

## 2023-03-21 ENCOUNTER — HOSPITAL ENCOUNTER (OUTPATIENT)
Dept: CARDIAC REHAB | Facility: CLINIC | Age: 58
Discharge: HOME | End: 2023-03-21
Attending: INTERNAL MEDICINE
Payer: COMMERCIAL

## 2023-03-21 PROCEDURE — 93798 PHYS/QHP OP CAR RHAB W/ECG: CPT | Performed by: REHABILITATION PRACTITIONER

## 2023-03-23 ENCOUNTER — HOSPITAL ENCOUNTER (OUTPATIENT)
Dept: CARDIAC REHAB | Facility: CLINIC | Age: 58
Discharge: HOME | End: 2023-03-23
Attending: INTERNAL MEDICINE
Payer: COMMERCIAL

## 2023-03-23 PROCEDURE — 93798 PHYS/QHP OP CAR RHAB W/ECG: CPT

## 2023-03-28 ENCOUNTER — HOSPITAL ENCOUNTER (OUTPATIENT)
Dept: CARDIAC REHAB | Facility: CLINIC | Age: 58
Discharge: HOME | End: 2023-03-28
Attending: INTERNAL MEDICINE
Payer: COMMERCIAL

## 2023-03-28 PROCEDURE — 93798 PHYS/QHP OP CAR RHAB W/ECG: CPT | Performed by: REHABILITATION PRACTITIONER

## 2023-03-30 ENCOUNTER — OFFICE VISIT (OUTPATIENT)
Dept: PEDIATRICS | Facility: CLINIC | Age: 58
End: 2023-03-30
Payer: COMMERCIAL

## 2023-03-30 VITALS
TEMPERATURE: 97.5 F | HEART RATE: 71 BPM | DIASTOLIC BLOOD PRESSURE: 71 MMHG | BODY MASS INDEX: 37.49 KG/M2 | SYSTOLIC BLOOD PRESSURE: 119 MMHG | WEIGHT: 211.6 LBS | OXYGEN SATURATION: 98 % | HEIGHT: 63 IN | RESPIRATION RATE: 16 BRPM

## 2023-03-30 DIAGNOSIS — M54.50 LEFT-SIDED LOW BACK PAIN WITHOUT SCIATICA, UNSPECIFIED CHRONICITY: ICD-10-CM

## 2023-03-30 DIAGNOSIS — D18.09 HEMANGIOMA OF SPINE: Primary | ICD-10-CM

## 2023-03-30 DIAGNOSIS — Z12.31 ENCOUNTER FOR SCREENING MAMMOGRAM FOR BREAST CANCER: ICD-10-CM

## 2023-03-30 PROCEDURE — 99213 OFFICE O/P EST LOW 20 MIN: CPT | Performed by: NURSE PRACTITIONER

## 2023-03-30 ASSESSMENT — PAIN SCALES - GENERAL: PAINLEVEL: NO PAIN (0)

## 2023-03-30 NOTE — PROGRESS NOTES
"  Assessment & Plan     Hemangioma of spine  Left-sided low back pain without sciatica, unspecified chronicity  I discussed with her that the hemangioma could likely be incidental finding and not r/t to pain but since she is having pain reasonable to refer to discuss with specialist.  - Spine  Referral; Future    Encounter for screening mammogram for breast cancer  - *MA Screening Digital Bilateral; Future        There are no Patient Instructions on file for this visit.    Elizabeth Kraus NP  [facility] CARLOS Cid is a 57 year old, presenting for the following health issues:  No chief complaint on file.  No flowsheet data found.  History of Present Illness       Reason for visit:  Lesion on my L 3  Symptom onset:  More than a month  Symptoms include:  Ache in left backside  Symptom intensity:  Mild  Symptom progression:  Staying the same  Had these symptoms before:  No  What makes it worse:  No  What makes it better:  No    She eats 2-3 servings of fruits and vegetables daily.She consumes 1 sweetened beverage(s) daily.She exercises with enough effort to increase her heart rate 30 to 60 minutes per day.  She exercises with enough effort to increase her heart rate 4 days per week.   She is taking medications regularly.       Had CT scan done from cardiologist  Possible hemangioma L3  Also has intermittent left sided low back pain for years  Denies any radicular symptoms  Pain doesn't interfere with daily activities          Review of Systems         Objective    /71   Pulse 71   Temp 97.5  F (36.4  C) (Oral)   Resp 16   Ht 1.588 m (5' 2.5\")   Wt 96 kg (211 lb 9.6 oz)   LMP  (LMP Unknown)   SpO2 98%   BMI 38.09 kg/m    Body mass index is 38.09 kg/m .  Physical Exam   GENERAL: healthy, alert and no distress  PSYCH: mentation appears normal, affect normal/bright                    "

## 2023-04-02 ENCOUNTER — HEALTH MAINTENANCE LETTER (OUTPATIENT)
Age: 58
End: 2023-04-02

## 2023-04-03 ENCOUNTER — TELEPHONE (OUTPATIENT)
Dept: NEUROSURGERY | Facility: CLINIC | Age: 58
End: 2023-04-03
Payer: COMMERCIAL

## 2023-04-03 NOTE — TELEPHONE ENCOUNTER
LVM for patient to call back to r/s appointment with KAMI due to no MRI on file. Central scheduling put patient on Dr. Landrum's clinic so it has to be cancelled and rescheduled per protocol.

## 2023-04-04 ENCOUNTER — HOSPITAL ENCOUNTER (OUTPATIENT)
Dept: CARDIAC REHAB | Facility: CLINIC | Age: 58
Discharge: HOME | End: 2023-04-04
Attending: INTERNAL MEDICINE
Payer: COMMERCIAL

## 2023-04-04 PROCEDURE — 93798 PHYS/QHP OP CAR RHAB W/ECG: CPT | Performed by: REHABILITATION PRACTITIONER

## 2023-04-06 ENCOUNTER — HOSPITAL ENCOUNTER (OUTPATIENT)
Dept: CARDIAC REHAB | Facility: CLINIC | Age: 58
Discharge: HOME | End: 2023-04-06
Attending: INTERNAL MEDICINE
Payer: COMMERCIAL

## 2023-04-06 PROCEDURE — 93798 PHYS/QHP OP CAR RHAB W/ECG: CPT

## 2023-04-11 ENCOUNTER — HOSPITAL ENCOUNTER (OUTPATIENT)
Dept: CARDIAC REHAB | Facility: CLINIC | Age: 58
Discharge: HOME | End: 2023-04-11
Attending: INTERNAL MEDICINE
Payer: COMMERCIAL

## 2023-04-11 PROCEDURE — 93798 PHYS/QHP OP CAR RHAB W/ECG: CPT

## 2023-04-17 ENCOUNTER — TRANSFERRED RECORDS (OUTPATIENT)
Dept: HEALTH INFORMATION MANAGEMENT | Facility: CLINIC | Age: 58
End: 2023-04-17

## 2023-04-17 ENCOUNTER — PRE VISIT (OUTPATIENT)
Dept: NEUROSURGERY | Facility: CLINIC | Age: 58
End: 2023-04-17

## 2023-04-17 NOTE — TELEPHONE ENCOUNTER
NEUROSURGERY- NEW PREVISIT PLANNING       Record Status/Location     Referring Provider In chart Elizabeth Kraus, NP   Diagnosis Referral Left-sided low back pain without sciatica, unspecified chronicity   MRI (HEAD, NECK, SPINE) Na No   CT PACS Head 2/17/23 [facility]   X-ray Na No   INJECTION Imaging notes  2/8/23 CT angiography with an injection of 75 mL Isovue-370 IV   PHYSICAL THERAPY Na No   SURGERY Na No

## 2023-04-20 ENCOUNTER — HOSPITAL ENCOUNTER (OUTPATIENT)
Dept: CARDIAC REHAB | Facility: CLINIC | Age: 58
Discharge: HOME | End: 2023-04-20
Attending: INTERNAL MEDICINE
Payer: COMMERCIAL

## 2023-04-20 PROCEDURE — 93798 PHYS/QHP OP CAR RHAB W/ECG: CPT

## 2023-04-25 ENCOUNTER — HOSPITAL ENCOUNTER (OUTPATIENT)
Dept: CARDIAC REHAB | Facility: CLINIC | Age: 58
Discharge: HOME | End: 2023-04-25
Attending: INTERNAL MEDICINE
Payer: COMMERCIAL

## 2023-04-25 PROCEDURE — 93798 PHYS/QHP OP CAR RHAB W/ECG: CPT | Performed by: REHABILITATION PRACTITIONER

## 2023-04-26 ENCOUNTER — OFFICE VISIT (OUTPATIENT)
Dept: NEUROSURGERY | Facility: CLINIC | Age: 58
End: 2023-04-26
Payer: COMMERCIAL

## 2023-04-26 VITALS — OXYGEN SATURATION: 99 % | HEART RATE: 80 BPM | SYSTOLIC BLOOD PRESSURE: 101 MMHG | DIASTOLIC BLOOD PRESSURE: 68 MMHG

## 2023-04-26 DIAGNOSIS — D18.00 HEMANGIOMA, UNSPECIFIED SITE: ICD-10-CM

## 2023-04-26 DIAGNOSIS — R93.5 ABNORMAL CT OF THE ABDOMEN: Primary | ICD-10-CM

## 2023-04-26 PROCEDURE — 99213 OFFICE O/P EST LOW 20 MIN: CPT | Performed by: NURSE PRACTITIONER

## 2023-04-26 ASSESSMENT — PAIN SCALES - GENERAL: PAINLEVEL: NO PAIN (0)

## 2023-04-26 NOTE — PROGRESS NOTES
[facility] Neurosurgery  Neurosurgery Clinic Visit      CC: abnormal CTA abdomen pelvis    Primary care Provider: Ml Turner    Reason For Visit:   I was asked by Elizabeth Kraus NP to consult on the patient for hemangioma of spine, left-sided low back pain without sciatica.    HPI: Palak Eckert is a 57 year old female who presents today for evaluation of an abnormal, incidental finding on her CTA abdomen pelvis. She reports some intermittent left flank pain. She had an incident a while back of bilateral knee pain that was associated with bilateral leg pain after playing tennis for 4 days in a row. She has since had resolution of the leg pain, however continues to have the intermittent medial knee pain. No paresthesias or overt weakness. Has had some difficulty bending her knees. No complaints of bowel/bladder dysfunction. No personal history of malignancy.      Past Medical History:   Diagnosis Date     Coronary artery dissection 01/03/2023    Left circumflex     Unspecified sinusitis (chronic)        Past Medical History reviewed with patient during visit.    Past Surgical History:   Procedure Laterality Date     COLONOSCOPY  8/11/2015    Dr. No [facility]     COLONOSCOPY N/A 8/11/2015    Procedure: COLONOSCOPY;  Surgeon: Jake No MD;  Location:  GI     CV CORONARY ANGIOGRAM N/A 1/3/2023    Procedure: Coronary Angiogram;  Surgeon: Chong Schmitz MD;  Location:  HEART CARDIAC CATH LAB     CV LEFT HEART CATH N/A 1/3/2023    Procedure: Left Heart Catheterization;  Surgeon: Chong Schmitz MD;  Location:  HEART CARDIAC CATH LAB     [facility] NONSPECIFIC PROCEDURE  2001    sinus op     Past Surgical History reviewed with patient during visit.    Current Outpatient Medications   Medication     acetaminophen (TYLENOL) 325 MG tablet     aspirin (ASA) 81 MG EC tablet     metoprolol tartrate (LOPRESSOR) 25 MG tablet     multivitamin w/minerals (THERA-VIT-M) tablet     nitroGLYcerin  (NITROSTAT) 0.4 MG sublingual tablet     No current facility-administered medications for this visit.       Allergies   Allergen Reactions     No Known Drug Allergy        Social History     Socioeconomic History     Marital status:      Spouse name: Bill     Number of children: 0     Years of education: 16     Highest education level: None   Occupational History     Occupation: teacher   Tobacco Use     Smoking status: Never     Smokeless tobacco: Never   Vaping Use     Vaping status: Never Used   Substance and Sexual Activity     Alcohol use: No     Comment: maybe 1 time per year     Drug use: No     Sexual activity: Yes     Partners: Male     Social Determinants of Health     Financial Resource Strain: Low Risk  (1/17/2022)    Overall Financial Resource Strain (CARDIA)      Difficulty of Paying Living Expenses: Not very hard   Food Insecurity: No Food Insecurity (1/17/2022)    Hunger Vital Sign      Worried About Running Out of Food in the Last Year: Never true      Ran Out of Food in the Last Year: Never true   Transportation Needs: No Transportation Needs (1/17/2022)    PRAPARE - Transportation      Lack of Transportation (Medical): No      Lack of Transportation (Non-Medical): No   Physical Activity: Inactive (1/17/2022)    Exercise Vital Sign      Days of Exercise per Week: 0 days      Minutes of Exercise per Session: 0 min   Stress: No Stress Concern Present (1/17/2022)    Citizen of [place] of Occupational Health - Occupational Stress Questionnaire      Feeling of Stress : Only a little   Social Connections: Moderately Isolated (1/17/2022)    Social Connection and Isolation Panel [NHANES]      Frequency of Communication with Friends and Family: Once a week      Frequency of Social Gatherings with Friends and Family: Never      Attends Episcopal Services: More than 4 times per year      Active Member of Clubs or Organizations: No      Marital Status:    Housing Stability: Low Risk  (1/17/2022)     Housing Stability Vital Sign      Unable to Pay for Housing in the Last Year: No      Number of Places Lived in the Last Year: 1      Unstable Housing in the Last Year: No       Family History   Problem Relation Age of Onset     Cancer Father         kidney     Respiratory Father         emphysema     Diabetes Father      Colon Cancer No family hx of          ROS: 10 point ROS neg other than the symptoms noted above in the HPI.    Vital Signs:   /68   Pulse 80   LMP  (LMP Unknown)   SpO2 99%       Examination:  Constitutional:  Alert, well nourished, NAD.  Memory: recent and remote memory   HEENT: Normocephalic, atraumatic.   Pulm:  Without shortness of breath   CV:  No pitting edema of BLE.      Neurological:  Awake  Alert  Oriented x 3  Speech clear    Motor exam:   Hip Flexion:                 Right: 5/5  Left:  5/5  Hip Abduction:             Right:  5/5  Left:  5/5  Hip Adduction:             Right:  5/5  Left:  5/5  Plantar Flexion:           Right:  5/5  Left:  5/5  Dorsal Flexion:            Right:  5/5  Left:  5/5  EHL:                            Right:  5/5  Left:  5/5     Sensation normal to bilateral upper and lower extremities  Muscle tone to bilateral upper and lower extremities   Gait: Able to stand from a seated position. Normal non-antalgic, non-myelopathic gait.  Able to heel/toe walk without loss of balance    Lumbar examination reveals no tenderness of the spine or paraspinous muscles.  Hip height is symmetrical. Negative SI joint, sciatic notch or greater trochanteric tenderness to palpation bilaterally.  Straight leg raise is negative bilaterally.      Imaging:   CTA abdomen pelvis 2/17/2023 with nonspecific lucent lesion of the L3 vertebral body which could represent hemangioma.    Assessment/Plan:   Hemangioma  Left flank pain  Bilateral knee pain    Reviewed CTA abdomen pelvis as it relates to the spine in clinic. Due to lesion, would recommend lumbar MRI for evaluation at this  time. Defer knee pain to orthopedic specialist. I will contact her with the results of the lumbar MRI and further recommendations.  She verbalized understanding and agreement with the plan.    Patient Instructions   -Lumbar MRI ordered. They will contact you to schedule. I will contact you with the results and further recommendations.  -Follow up with an orthopedic specialist regarding your knee pain.  -Please contact our clinic with questions or concerns at [phone].      Josseline Oates, [address] [address]  Tel [phone]  Fax [phone]

## 2023-04-26 NOTE — LETTER
4/26/2023         RE: Palak Eckert  [address]  [place] [address]        Dear Colleague,    Thank you for referring your patient, Palak Eckert, to the [facility] NEUROLOGY CLINICS [facility]. Please see a copy of my visit note below.    [facility] Neurosurgery  Neurosurgery Clinic Visit      CC: abnormal CTA abdomen pelvis    Primary care Provider: Ml Turner    Reason For Visit:   I was asked by Elizabeth Kraus NP to consult on the patient for hemangioma of spine, left-sided low back pain without sciatica.    HPI: Palak Eckert is a 57 year old female who presents today for evaluation of an abnormal, incidental finding on her CTA abdomen pelvis. She reports some intermittent left flank pain. She had an incident a while back of bilateral knee pain that was associated with bilateral leg pain after playing tennis for 4 days in a row. She has since had resolution of the leg pain, however continues to have the intermittent medial knee pain. No paresthesias or overt weakness. Has had some difficulty bending her knees. No complaints of bowel/bladder dysfunction. No personal history of malignancy.      Past Medical History:   Diagnosis Date     Coronary artery dissection 01/03/2023    Left circumflex     Unspecified sinusitis (chronic)        Past Medical History reviewed with patient during visit.    Past Surgical History:   Procedure Laterality Date     COLONOSCOPY  8/11/2015    Dr. No [facility]     COLONOSCOPY N/A 8/11/2015    Procedure: COLONOSCOPY;  Surgeon: Jake No MD;  Location:  GI     CV CORONARY ANGIOGRAM N/A 1/3/2023    Procedure: Coronary Angiogram;  Surgeon: Chong Schmitz MD;  Location:  HEART CARDIAC CATH LAB     CV LEFT HEART CATH N/A 1/3/2023    Procedure: Left Heart Catheterization;  Surgeon: Chong Schmitz MD;  Location:  HEART CARDIAC CATH LAB     [facility] NONSPECIFIC PROCEDURE  2001    sinus op     Past Surgical History reviewed  with patient during visit.    Current Outpatient Medications   Medication     acetaminophen (TYLENOL) 325 MG tablet     aspirin (ASA) 81 MG EC tablet     metoprolol tartrate (LOPRESSOR) 25 MG tablet     multivitamin w/minerals (THERA-VIT-M) tablet     nitroGLYcerin (NITROSTAT) 0.4 MG sublingual tablet     No current facility-administered medications for this visit.       Allergies   Allergen Reactions     No Known Drug Allergy        Social History     Socioeconomic History     Marital status:      Spouse name: Bill     Number of children: 0     Years of education: 16     Highest education level: None   Occupational History     Occupation: teacher   Tobacco Use     Smoking status: Never     Smokeless tobacco: Never   Vaping Use     Vaping status: Never Used   Substance and Sexual Activity     Alcohol use: No     Comment: maybe 1 time per year     Drug use: No     Sexual activity: Yes     Partners: Male     Social Determinants of Health     Financial Resource Strain: Low Risk  (1/17/2022)    Overall Financial Resource Strain (CARDIA)      Difficulty of Paying Living Expenses: Not very hard   Food Insecurity: No Food Insecurity (1/17/2022)    Hunger Vital Sign      Worried About Running Out of Food in the Last Year: Never true      Ran Out of Food in the Last Year: Never true   Transportation Needs: No Transportation Needs (1/17/2022)    PRAPARE - Transportation      Lack of Transportation (Medical): No      Lack of Transportation (Non-Medical): No   Physical Activity: Inactive (1/17/2022)    Exercise Vital Sign      Days of Exercise per Week: 0 days      Minutes of Exercise per Session: 0 min   Stress: No Stress Concern Present (1/17/2022)    [place] of Occupational Health - Occupational Stress Questionnaire      Feeling of Stress : Only a little   Social Connections: Moderately Isolated (1/17/2022)    Social Connection and Isolation Panel [NHANES]      Frequency of Communication with Friends and  Family: Once a week      Frequency of Social Gatherings with Friends and Family: Never      Attends Yazdanism Services: More than 4 times per year      Active Member of Clubs or Organizations: No      Marital Status:    Housing Stability: Low Risk  (1/17/2022)    Housing Stability Vital Sign      Unable to Pay for Housing in the Last Year: No      Number of Places Lived in the Last Year: 1      Unstable Housing in the Last Year: No       Family History   Problem Relation Age of Onset     Cancer Father         kidney     Respiratory Father         emphysema     Diabetes Father      Colon Cancer No family hx of          ROS: 10 point ROS neg other than the symptoms noted above in the HPI.    Vital Signs:   /68   Pulse 80   LMP  (LMP Unknown)   SpO2 99%       Examination:  Constitutional:  Alert, well nourished, NAD.  Memory: recent and remote memory   HEENT: Normocephalic, atraumatic.   Pulm:  Without shortness of breath   CV:  No pitting edema of BLE.      Neurological:  Awake  Alert  Oriented x 3  Speech clear    Motor exam:   Hip Flexion:                 Right: 5/5  Left:  5/5  Hip Abduction:             Right:  5/5  Left:  5/5  Hip Adduction:             Right:  5/5  Left:  5/5  Plantar Flexion:           Right:  5/5  Left:  5/5  Dorsal Flexion:            Right:  5/5  Left:  5/5  EHL:                            Right:  5/5  Left:  5/5     Sensation normal to bilateral upper and lower extremities  Muscle tone to bilateral upper and lower extremities   Gait: Able to stand from a seated position. Normal non-antalgic, non-myelopathic gait.  Able to heel/toe walk without loss of balance    Lumbar examination reveals no tenderness of the spine or paraspinous muscles.  Hip height is symmetrical. Negative SI joint, sciatic notch or greater trochanteric tenderness to palpation bilaterally.  Straight leg raise is negative bilaterally.      Imaging:   CTA abdomen pelvis 2/17/2023 with nonspecific lucent  lesion of the L3 vertebral body which could represent hemangioma.    Assessment/Plan:   Hemangioma  Left flank pain  Bilateral knee pain    Reviewed CTA abdomen pelvis as it relates to the spine in clinic. Due to lesion, would recommend lumbar MRI for evaluation at this time. Defer knee pain to orthopedic specialist. I will contact her with the results of the lumbar MRI and further recommendations.  She verbalized understanding and agreement with the plan.    Patient Instructions   -Lumbar MRI ordered. They will contact you to schedule. I will contact you with the results and further recommendations.  -Follow up with an orthopedic specialist regarding your knee pain.  -Please contact our clinic with questions or concerns at [phone].      Josseline Oates CNP  [facility] Neurosurgery  [address] [place] [address]  Tel [phone]  Fax [phone]        Again, thank you for allowing me to participate in the care of your patient.        Sincerely,        Josseline Oates, JACLYN

## 2023-04-26 NOTE — PATIENT INSTRUCTIONS
-Lumbar MRI ordered. They will contact you to schedule. I will contact you with the results and further recommendations.  -Follow up with an orthopedic specialist regarding your knee pain.  -Please contact our clinic with questions or concerns at [phone].

## 2023-04-26 NOTE — NURSING NOTE
"Palak Eckert is a 57 year old female who presents for:  Chief Complaint   Patient presents with     Consult     Lesion L3        Initial Vitals:  /68   Pulse 80   LMP  (LMP Unknown)   SpO2 99%  Estimated body mass index is 38.09 kg/m  as calculated from the following:    Height as of 3/30/23: 5' 2.5\" (1.588 m).    Weight as of 3/30/23: 211 lb 9.6 oz (96 kg).. There is no height or weight on file to calculate BSA. BP completed using cuff size: X-large  No Pain (0)        Amendo Phorn    "

## 2023-04-27 ENCOUNTER — HOSPITAL ENCOUNTER (OUTPATIENT)
Dept: CARDIAC REHAB | Facility: CLINIC | Age: 58
Discharge: HOME | End: 2023-04-27
Attending: INTERNAL MEDICINE
Payer: COMMERCIAL

## 2023-04-27 PROCEDURE — 93798 PHYS/QHP OP CAR RHAB W/ECG: CPT | Performed by: REHABILITATION PRACTITIONER

## 2023-05-02 ENCOUNTER — HOSPITAL ENCOUNTER (OUTPATIENT)
Dept: CARDIAC REHAB | Facility: CLINIC | Age: 58
Discharge: HOME | End: 2023-05-02
Attending: INTERNAL MEDICINE
Payer: COMMERCIAL

## 2023-05-02 PROCEDURE — 93798 PHYS/QHP OP CAR RHAB W/ECG: CPT | Performed by: REHABILITATION PRACTITIONER

## 2023-05-04 ENCOUNTER — HOSPITAL ENCOUNTER (OUTPATIENT)
Dept: MRI IMAGING | Facility: CLINIC | Age: 58
Discharge: HOME | End: 2023-05-04
Attending: NURSE PRACTITIONER | Admitting: NURSE PRACTITIONER
Payer: COMMERCIAL

## 2023-05-04 DIAGNOSIS — D18.00 HEMANGIOMA, UNSPECIFIED SITE: ICD-10-CM

## 2023-05-04 DIAGNOSIS — R93.5 ABNORMAL CT OF THE ABDOMEN: ICD-10-CM

## 2023-05-04 PROCEDURE — 72158 MRI LUMBAR SPINE W/O & W/DYE: CPT

## 2023-05-04 PROCEDURE — A9585 GADOBUTROL INJECTION: HCPCS | Performed by: NURSE PRACTITIONER

## 2023-05-04 PROCEDURE — 255N000002 HC RX 255 OP 636: Performed by: NURSE PRACTITIONER

## 2023-05-04 RX ORDER — GADOBUTROL 604.72 MG/ML
10 INJECTION INTRAVENOUS ONCE
Status: COMPLETED | OUTPATIENT
Start: 2023-05-04 | End: 2023-05-04

## 2023-05-04 RX ADMIN — GADOBUTROL 10 ML: 604.72 INJECTION INTRAVENOUS at 09:50

## 2023-05-09 ENCOUNTER — HOSPITAL ENCOUNTER (OUTPATIENT)
Dept: CARDIAC REHAB | Facility: CLINIC | Age: 58
Discharge: HOME | End: 2023-05-09
Attending: INTERNAL MEDICINE
Payer: COMMERCIAL

## 2023-05-09 PROCEDURE — 93798 PHYS/QHP OP CAR RHAB W/ECG: CPT | Performed by: REHABILITATION PRACTITIONER

## 2023-05-10 ENCOUNTER — MYC MEDICAL ADVICE (OUTPATIENT)
Dept: NEUROSURGERY | Facility: CLINIC | Age: 58
End: 2023-05-10
Payer: COMMERCIAL

## 2023-05-11 ENCOUNTER — HOSPITAL ENCOUNTER (OUTPATIENT)
Dept: CARDIAC REHAB | Facility: CLINIC | Age: 58
Discharge: HOME | End: 2023-05-11
Attending: INTERNAL MEDICINE
Payer: COMMERCIAL

## 2023-05-11 PROCEDURE — 93798 PHYS/QHP OP CAR RHAB W/ECG: CPT | Performed by: REHABILITATION PRACTITIONER

## 2023-05-16 ENCOUNTER — HOSPITAL ENCOUNTER (OUTPATIENT)
Dept: CARDIAC REHAB | Facility: CLINIC | Age: 58
Discharge: HOME | End: 2023-05-16
Attending: INTERNAL MEDICINE
Payer: COMMERCIAL

## 2023-05-16 PROCEDURE — 93798 PHYS/QHP OP CAR RHAB W/ECG: CPT

## 2023-05-18 ENCOUNTER — HOSPITAL ENCOUNTER (OUTPATIENT)
Dept: CARDIAC REHAB | Facility: CLINIC | Age: 58
Discharge: HOME | End: 2023-05-18
Attending: INTERNAL MEDICINE
Payer: COMMERCIAL

## 2023-05-18 PROCEDURE — 93798 PHYS/QHP OP CAR RHAB W/ECG: CPT

## 2023-05-23 ENCOUNTER — HOSPITAL ENCOUNTER (OUTPATIENT)
Dept: CARDIAC REHAB | Facility: CLINIC | Age: 58
Discharge: HOME | End: 2023-05-23
Attending: INTERNAL MEDICINE
Payer: COMMERCIAL

## 2023-05-23 PROCEDURE — 93798 PHYS/QHP OP CAR RHAB W/ECG: CPT

## 2023-06-06 ENCOUNTER — HOSPITAL ENCOUNTER (OUTPATIENT)
Dept: CARDIAC REHAB | Facility: CLINIC | Age: 58
Discharge: HOME | End: 2023-06-06
Attending: INTERNAL MEDICINE
Payer: COMMERCIAL

## 2023-06-06 PROCEDURE — 93798 PHYS/QHP OP CAR RHAB W/ECG: CPT

## 2023-06-08 ENCOUNTER — HOSPITAL ENCOUNTER (OUTPATIENT)
Dept: CARDIAC REHAB | Facility: CLINIC | Age: 58
Discharge: HOME | End: 2023-06-08
Attending: INTERNAL MEDICINE
Payer: COMMERCIAL

## 2023-06-08 PROCEDURE — 93797 PHYS/QHP OP CAR RHAB WO ECG: CPT

## 2023-06-08 PROCEDURE — 93798 PHYS/QHP OP CAR RHAB W/ECG: CPT

## 2023-06-20 ENCOUNTER — ANCILLARY PROCEDURE (OUTPATIENT)
Dept: MAMMOGRAPHY | Facility: CLINIC | Age: 58
End: 2023-06-20
Attending: NURSE PRACTITIONER
Payer: COMMERCIAL

## 2023-06-20 DIAGNOSIS — Z12.31 ENCOUNTER FOR SCREENING MAMMOGRAM FOR BREAST CANCER: ICD-10-CM

## 2023-06-20 PROCEDURE — 77067 SCR MAMMO BI INCL CAD: CPT | Mod: TC | Performed by: RADIOLOGY

## 2023-07-05 ASSESSMENT — ENCOUNTER SYMPTOMS
FEVER: 0
HEMATOCHEZIA: 0
PARESTHESIAS: 0
HEMATURIA: 0
HEARTBURN: 0
DIARRHEA: 0
COUGH: 0
MYALGIAS: 0
DIZZINESS: 0
SORE THROAT: 0
WEAKNESS: 0
JOINT SWELLING: 0
EYE PAIN: 0
FREQUENCY: 0
CHILLS: 0
HEADACHES: 0
SHORTNESS OF BREATH: 0
BREAST MASS: 0
PALPITATIONS: 0
NERVOUS/ANXIOUS: 0
ABDOMINAL PAIN: 0
DYSURIA: 0
NAUSEA: 0
ARTHRALGIAS: 1
CONSTIPATION: 0

## 2023-07-06 ENCOUNTER — OFFICE VISIT (OUTPATIENT)
Dept: PEDIATRICS | Facility: CLINIC | Age: 58
End: 2023-07-06
Payer: COMMERCIAL

## 2023-07-06 VITALS
BODY MASS INDEX: 37.6 KG/M2 | DIASTOLIC BLOOD PRESSURE: 62 MMHG | OXYGEN SATURATION: 98 % | HEART RATE: 75 BPM | RESPIRATION RATE: 16 BRPM | HEIGHT: 62 IN | WEIGHT: 204.3 LBS | SYSTOLIC BLOOD PRESSURE: 118 MMHG | TEMPERATURE: 98.9 F

## 2023-07-06 DIAGNOSIS — Z00.00 ROUTINE GENERAL MEDICAL EXAMINATION AT A HEALTH CARE FACILITY: Primary | ICD-10-CM

## 2023-07-06 DIAGNOSIS — Z12.31 VISIT FOR SCREENING MAMMOGRAM: ICD-10-CM

## 2023-07-06 DIAGNOSIS — M25.561 CHRONIC PAIN OF BOTH KNEES: ICD-10-CM

## 2023-07-06 DIAGNOSIS — I25.42 CORONARY ARTERY DISSECTION: ICD-10-CM

## 2023-07-06 DIAGNOSIS — G89.29 CHRONIC PAIN OF BOTH KNEES: ICD-10-CM

## 2023-07-06 DIAGNOSIS — M25.562 CHRONIC PAIN OF BOTH KNEES: ICD-10-CM

## 2023-07-06 PROCEDURE — 99396 PREV VISIT EST AGE 40-64: CPT | Mod: GC

## 2023-07-06 ASSESSMENT — ENCOUNTER SYMPTOMS
COUGH: 0
PALPITATIONS: 0
JOINT SWELLING: 0
PARESTHESIAS: 0
DIARRHEA: 0
HEMATOCHEZIA: 0
NAUSEA: 0
FEVER: 0
ARTHRALGIAS: 1
BREAST MASS: 0
MYALGIAS: 0
WEAKNESS: 0
HEADACHES: 0
SORE THROAT: 0
ABDOMINAL PAIN: 0
FREQUENCY: 0
EYE PAIN: 0
NERVOUS/ANXIOUS: 0
HEMATURIA: 0
HEARTBURN: 0
SHORTNESS OF BREATH: 0
CONSTIPATION: 0
DYSURIA: 0
CHILLS: 0
DIZZINESS: 0

## 2023-07-06 ASSESSMENT — PAIN SCALES - GENERAL: PAINLEVEL: MODERATE PAIN (5)

## 2023-07-06 NOTE — PROGRESS NOTES
SUBJECTIVE:   CC: Palak is an 58 year old who presents for preventive health visit.       2023     1:36 PM   Additional Questions   Roomed by thomas   Accompanied by carlota         2023     1:36 PM   Patient Reported Additional Medications   Patient reports taking the following new medications carlota     Healthy Habits:     Getting at least 3 servings of Calcium per day:  Yes    Bi-annual eye exam:  Yes    Dental care twice a year:  Yes    Sleep apnea or symptoms of sleep apnea:  None    Diet:  Regular (no restrictions)    Frequency of exercise:  4-5 days/week    Duration of exercise:  30-45 minutes    Taking medications regularly:  No    Barriers to taking medications:  None    Medication side effects:  Other    Additional concerns today:  Yes    Knee pain after four days of tennis 2-3 years ago.  Does not hurt when active, hurts more after sitting for longer periods of time. Both knees are quite stiff in the morning and after keeping legs straight for a while. Takes about 30 seconds to work on bending knee. Tylenol sometimes helps.     No fevers, chills, rashes. Also has some soreness in bilateral shoulders.     Today's PHQ-2 Score:       2023     3:53 PM   PHQ-2 (  [employer])   Q1: Little interest or pleasure in doing things 0   Q2: Feeling down, depressed or hopeless 0   PHQ-2 Score 0   Q1: Little interest or pleasure in doing things Not at all   Q2: Feeling down, depressed or hopeless Not at all   PHQ-2 Score 0     Social History     Tobacco Use     Smoking status: Never     Smokeless tobacco: Never   Substance Use Topics     Alcohol use: No     Comment: maybe 1 time per year             2023     3:53 PM   Alcohol Use   Prescreen: >3 drinks/day or >7 drinks/week? Not Applicable     Reviewed orders with patient.  Reviewed health maintenance and updated orders accordingly - Yes      Breast Cancer Screenin/17/2022     3:25 PM 2022     5:32 PM   Breast CA Risk Assessment (FHS-7)   Do  "you have a family history of breast, colon, or ovarian cancer? No / Unknown No / Unknown           Pertinent mammograms are reviewed under the imaging tab.    History of abnormal Pap smear: NO - age 30-65 PAP every 5 years with negative HPV co-testing recommended      Latest Ref Rng & Units 1/17/2022     3:32 PM 7/21/2015     1:56 PM 7/21/2015    12:00 AM   PAP / HPV   PAP  Negative for Intraepithelial Lesion or Malignancy (NILM)      PAP (Historical)    NIL    HPV 16 DNA Negative Negative  Negative     HPV 18 DNA Negative Negative  Negative     Other HR HPV Negative Negative  Negative       Reviewed and updated as needed this visit by clinical staff   Tobacco  Allergies  Meds              Reviewed and updated as needed this visit by Provider     Meds               Review of Systems   Constitutional: Negative for chills and fever.   HENT: Negative for congestion, ear pain, hearing loss and sore throat.    Eyes: Negative for pain and visual disturbance.   Respiratory: Negative for cough and shortness of breath.    Cardiovascular: Negative for chest pain, palpitations and peripheral edema.   Gastrointestinal: Negative for abdominal pain, constipation, diarrhea, heartburn, hematochezia and nausea.   Breasts:  Negative for tenderness, breast mass and discharge.   Genitourinary: Negative for dysuria, frequency, genital sores, hematuria, pelvic pain, urgency, vaginal bleeding and vaginal discharge.   Musculoskeletal: Positive for arthralgias. Negative for joint swelling and myalgias.   Skin: Negative for rash.   Neurological: Negative for dizziness, weakness, headaches and paresthesias.   Psychiatric/Behavioral: Negative for mood changes. The patient is not nervous/anxious.      OBJECTIVE:   /62   Pulse 75   Temp 98.9  F (37.2  C) (Tympanic)   Resp 16   Ht 1.581 m (5' 2.25\")   Wt 92.7 kg (204 lb 4.8 oz)   LMP  (LMP Unknown)   SpO2 98%   BMI 37.07 kg/m    Physical Exam  GENERAL APPEARANCE: healthy, alert " "and no distress  EYES: Eyes grossly normal to inspection, PERRL and conjunctivae and sclerae normal  HENT: nose and mouth without ulcers or lesions, oropharynx clear and oral mucous membranes moist  NECK: no adenopathy, no asymmetry, masses, or scars and thyroid normal to palpation  RESP: lungs clear to auscultation - no rales, rhonchi or wheezes  CV: regular rate and rhythm, normal S1 S2, no S3 or S4, no murmur, click or rub, no peripheral edema and peripheral pulses strong  ABDOMEN: soft, nontender, no hepatosplenomegaly, no masses and bowel sounds normal  MS: Mild TTP on medical inferior bilateral knees near areas of pes anserine insertion; normal ROM at bilateral knees, no palpable effusions; anterior and posterior drawer tests negative  SKIN: no suspicious lesions or rashes  NEURO: Normal strength and tone, sensory exam grossly normal, mentation intact and speech normal  PSYCH: mentation appears normal and affect normal/bright    ASSESSMENT/PLAN:     Palak is a 57yo F who is presenting for annual physical.    1. Routine general medical examination at a health care facility    2. Coronary artery dissection  - Follows with Cardiology; currently weaning metoprolol with plan to discontinue within the next week per Cardiology recommendations    3. Chronic pain of both knees  Prior RA work-up largely negative, prior XR of bilateral knees without evidence of osteoarthritis. Exam not necessarily concerning for ACL, PCL, meniscal injuries. Pain location consistent with pes anserine bursitis.   - Tylenol and ice PRN  - Physical Therapy Referral; Future    4. Visit for screening mammogram  - *MA Screening Digital Bilateral; Future      COUNSELING:  Reviewed preventive health counseling, as reflected in patient instructions      BMI:   Estimated body mass index is 37.07 kg/m  as calculated from the following:    Height as of this encounter: 1.581 m (5' 2.25\").    Weight as of this encounter: 92.7 kg (204 lb 4.8 oz). "       She reports that she has never smoked. She has never used smokeless tobacco.          Heather Rosas MD  [facility]

## 2023-07-27 ASSESSMENT — ACTIVITIES OF DAILY LIVING (ADL)
PAIN: THE SYMPTOM AFFECTS MY ACTIVITY MODERATELY
AS_A_RESULT_OF_YOUR_KNEE_INJURY,_HOW_WOULD_YOU_RATE_YOUR_CURRENT_LEVEL_OF_DAILY_ACTIVITY?: NEARLY NORMAL
STAND: ACTIVITY IS NOT DIFFICULT
HOW_WOULD_YOU_RATE_THE_CURRENT_FUNCTION_OF_YOUR_KNEE_DURING_YOUR_USUAL_DAILY_ACTIVITIES_ON_A_SCALE_FROM_0_TO_100_WITH_100_BEING_YOUR_LEVEL_OF_KNEE_FUNCTION_PRIOR_TO_YOUR_INJURY_AND_0_BEING_THE_INABILITY_TO_PERFORM_ANY_OF_YOUR_USUAL_DAILY_ACTIVITIES?: 80
WEAKNESS: I DO NOT HAVE THE SYMPTOM
WALK: ACTIVITY IS MINIMALLY DIFFICULT
KNEE_ACTIVITY_OF_DAILY_LIVING_SCORE: 74.29
RISE FROM A CHAIR: ACTIVITY IS FAIRLY DIFFICULT
LIMPING: I HAVE THE SYMPTOM BUT IT DOES NOT AFFECT MY ACTIVITY
SIT WITH YOUR KNEE BENT: ACTIVITY IS NOT DIFFICULT
HOW_WOULD_YOU_RATE_THE_OVERALL_FUNCTION_OF_YOUR_KNEE_DURING_YOUR_USUAL_DAILY_ACTIVITIES?: NEARLY NORMAL
GO DOWN STAIRS: ACTIVITY IS MINIMALLY DIFFICULT
SQUAT: ACTIVITY IS MINIMALLY DIFFICULT
SWELLING: I HAVE THE SYMPTOM BUT IT DOES NOT AFFECT MY ACTIVITY
KNEE_ACTIVITY_OF_DAILY_LIVING_SUM: 52
GIVING WAY, BUCKLING OR SHIFTING OF KNEE: I DO NOT HAVE THE SYMPTOM
KNEEL ON THE FRONT OF YOUR KNEE: ACTIVITY IS FAIRLY DIFFICULT
STIFFNESS: THE SYMPTOM AFFECTS MY ACTIVITY MODERATELY
GO UP STAIRS: ACTIVITY IS MINIMALLY DIFFICULT
RAW_SCORE: 52

## 2023-07-28 ENCOUNTER — THERAPY VISIT (OUTPATIENT)
Dept: PHYSICAL THERAPY | Facility: CLINIC | Age: 58
End: 2023-07-28
Attending: STUDENT IN AN ORGANIZED HEALTH CARE EDUCATION/TRAINING PROGRAM
Payer: COMMERCIAL

## 2023-07-28 DIAGNOSIS — G89.29 CHRONIC PAIN OF BOTH KNEES: ICD-10-CM

## 2023-07-28 DIAGNOSIS — M25.561 CHRONIC PAIN OF BOTH KNEES: ICD-10-CM

## 2023-07-28 DIAGNOSIS — M25.562 CHRONIC PAIN OF BOTH KNEES: ICD-10-CM

## 2023-07-28 PROCEDURE — 97110 THERAPEUTIC EXERCISES: CPT | Mod: GP | Performed by: PHYSICAL THERAPIST

## 2023-07-28 PROCEDURE — 97161 PT EVAL LOW COMPLEX 20 MIN: CPT | Mod: GP | Performed by: PHYSICAL THERAPIST

## 2023-07-28 PROCEDURE — 97035 APP MDLTY 1+ULTRASOUND EA 15: CPT | Mod: GP | Performed by: PHYSICAL THERAPIST

## 2023-07-28 NOTE — PROGRESS NOTES
PHYSICAL THERAPY EVALUATION  Type of Visit: Evaluation    See electronic medical record for Abuse and Falls Screening details.    Subjective   Pt is a 59 y/o female who has bilateral knee pain after playing four days of tennis 2-3 years ago.  Does not hurt when active, hurts more after sitting for longer periods of time. Both knees are quite stiff in the morning and after keeping legs straight for a while. Takes about 30 seconds to work on bending knee. Tylenol sometimes helps.     IMPRESSION: x ray  1.  Right knee: Mild degenerative arthrosis with medial compartment  narrowing and marginal osteophytosis. No fracture.  2.  Left knee: Mild degenerative arthrosis with medial compartment  narrowing and marginal osteophytosis. No fracture.           Presenting condition or subjective complaint: pain and stiffness in both insides of knees especially after sitting for a period of time and when sleeping and getting up in the morning  Date of onset: 21    Relevant medical history:     Dates & types of surgery: Sinus surgery - (?)   ()    Prior diagnostic imaging/testing results: X-ray     Prior therapy history for the same diagnosis, illness or injury: No        Living Environment  Social support: With a significant other or spouse   Type of home: House; Multi-level   Stairs to enter the home: Yes 1 Is there a railing: No   Ramp: No   Stairs inside the home: Yes 19 Is there a railing: Yes   Help at home: None  Equipment owned:       Employment: Yes   Hobbies/Interests: playing tennis, biking, reading    Patient goals for therapy: Play tennis.   Wake up in the morning without stiffness/pain or get up from sitting without stiffness.         Objective   KNEE EVALUATION  GAIT:    Gait Deviations: WNL  WEIGHTBEARING ALIGNMENT:  bilateral pes planus    ROM:  Full active and prom in right knee. Full flexion of left knee. Left lacks 5 degrees of full extension with some pain into quad set.      STRENGTH:   Pain: - none + mild ++ moderate +++ severe  Strength Scale: 0-5/5 Left Right   Knee Flexion 4- 4-   Knee Extension 4 4   Quad Set       FLEXIBILITY:  tightness right and left quad and hamstrings.     FUNCTIONAL TESTS: Double Leg Squat: Good technique/no significant findings  PALPATION:  tenderness bilateral medial knee joint line and pes anserine  JOINT MOBILITY:  normal patellar mobility and mild/mod hypomobility into posterior tibial glide    Assessment & Plan   CLINICAL IMPRESSIONS  Medical Diagnosis: bilateral knee pain    Treatment Diagnosis:     Impression/Assessment: Patient is a 58 year old female with bilateral knee  complaints.  The following significant findings have been identified: Pain, Decreased ROM/flexibility, Decreased joint mobility, Decreased strength, and Inflammation. These impairments interfere with their ability to perform driving  and household mobility as compared to previous level of function.     Clinical Decision Making (Complexity):  Clinical Presentation: Stable/Uncomplicated  Clinical Presentation Rationale: based on medical and personal factors listed in PT evaluation  Clinical Decision Making (Complexity): Low complexity    PLAN OF CARE  Treatment Interventions:  Modalities: Cryotherapy, Ultrasound  Interventions: Manual Therapy, Neuromuscular Re-education, Therapeutic Activity, Therapeutic Exercise    Long Term Goals     PT Goal 1  Goal Identifier: sit to stand  Goal Description: patient with be able to sit to  the morning and after sitting for an hour with minimal to no knee pain  Target Date: 09/23/23      Frequency of Treatment: 1x/week  Duration of Treatment: 6 weeks         Risks and benefits of evaluation/treatment have been explained.   Patient/Family/caregiver agrees with Plan of Care.     Evaluation Time:     PT Eval, Low Complexity Minutes (55035): 18       Signing Clinician: BRIGHT ELAM, PT

## 2023-08-03 ENCOUNTER — THERAPY VISIT (OUTPATIENT)
Dept: PHYSICAL THERAPY | Facility: CLINIC | Age: 58
End: 2023-08-03
Attending: STUDENT IN AN ORGANIZED HEALTH CARE EDUCATION/TRAINING PROGRAM
Payer: COMMERCIAL

## 2023-08-03 DIAGNOSIS — M25.561 PAIN IN BOTH KNEES, UNSPECIFIED CHRONICITY: Primary | ICD-10-CM

## 2023-08-03 DIAGNOSIS — M25.562 PAIN IN BOTH KNEES, UNSPECIFIED CHRONICITY: Primary | ICD-10-CM

## 2023-08-03 PROCEDURE — 97110 THERAPEUTIC EXERCISES: CPT | Mod: GP | Performed by: PHYSICAL THERAPIST

## 2023-08-09 ENCOUNTER — THERAPY VISIT (OUTPATIENT)
Dept: PHYSICAL THERAPY | Facility: CLINIC | Age: 58
End: 2023-08-09
Payer: COMMERCIAL

## 2023-08-09 DIAGNOSIS — M25.561 PAIN IN BOTH KNEES, UNSPECIFIED CHRONICITY: Primary | ICD-10-CM

## 2023-08-09 DIAGNOSIS — M25.562 PAIN IN BOTH KNEES, UNSPECIFIED CHRONICITY: Primary | ICD-10-CM

## 2023-08-09 PROCEDURE — 97110 THERAPEUTIC EXERCISES: CPT | Mod: GP | Performed by: PHYSICAL THERAPIST

## 2023-08-16 ENCOUNTER — THERAPY VISIT (OUTPATIENT)
Dept: PHYSICAL THERAPY | Facility: CLINIC | Age: 58
End: 2023-08-16
Payer: COMMERCIAL

## 2023-08-16 DIAGNOSIS — M25.561 CHRONIC PAIN OF BOTH KNEES: Primary | ICD-10-CM

## 2023-08-16 DIAGNOSIS — G89.29 CHRONIC PAIN OF BOTH KNEES: Primary | ICD-10-CM

## 2023-08-16 DIAGNOSIS — M25.562 CHRONIC PAIN OF BOTH KNEES: Primary | ICD-10-CM

## 2023-08-16 PROCEDURE — 97530 THERAPEUTIC ACTIVITIES: CPT | Mod: GP | Performed by: PHYSICAL THERAPIST

## 2023-08-16 PROCEDURE — 97110 THERAPEUTIC EXERCISES: CPT | Mod: GP | Performed by: PHYSICAL THERAPIST

## 2023-08-25 ENCOUNTER — OFFICE VISIT (OUTPATIENT)
Dept: CARDIOLOGY | Facility: CLINIC | Age: 58
End: 2023-08-25
Attending: INTERNAL MEDICINE
Payer: COMMERCIAL

## 2023-08-25 VITALS
HEIGHT: 63 IN | DIASTOLIC BLOOD PRESSURE: 77 MMHG | OXYGEN SATURATION: 96 % | WEIGHT: 200 LBS | SYSTOLIC BLOOD PRESSURE: 129 MMHG | HEART RATE: 76 BPM | BODY MASS INDEX: 35.44 KG/M2

## 2023-08-25 DIAGNOSIS — I21.4 NSTEMI (NON-ST ELEVATED MYOCARDIAL INFARCTION) (H): Primary | ICD-10-CM

## 2023-08-25 DIAGNOSIS — I77.3 FIBROMUSCULAR DYSPLASIA (H): ICD-10-CM

## 2023-08-25 DIAGNOSIS — I25.42 SPONTANEOUS DISSECTION OF CORONARY ARTERY: ICD-10-CM

## 2023-08-25 PROCEDURE — 99214 OFFICE O/P EST MOD 30 MIN: CPT | Performed by: INTERNAL MEDICINE

## 2023-08-25 NOTE — PROGRESS NOTES
Vascular Cardiology Consultation      HPI:     She is a very pleasant 57-year-old female with no PMH here for follow up of SCAD event. SHe was referred by Dr. Amor.    She presented to [facility] on January with complaints of chest discomfort. Her ECG showed nonspecific ST-T changes and her troponin was elevated therefore she proceeded to have coronary angiography.     It revealed occluded circumflex from the ostium with faint left to left collaterals distally. 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 The left main, LAD and RCA were free of significant disease. Given concern for possible retrograde progression of the ostial circumflex dissection to the left main, the patient was transferred to [facility] where she was monitored for several more days. She was eventually discharged on dual antiplatelet therapy, statin and beta-blocker.     She since then has had no significant event. She denies chest pain, SOB.     Family history reviewed.   No h/o sudden death, aneurysms, stroke, MI. Parents are old age.  Twin daughters, healthy.     Had job stress leading to event. She is a .    No history of HTN. On metoprolol now off. Off plavix now. On aspirin.     Aorta 4.0 cm on surveillance imaging. No fam h/o aortic disease.    On ROS: no lens dislocation, normal eye-width, normal uvula, normal palate, no hypermobility in thumb joints, no skin translucency, normal skin, no vaginal prolapse or hernias, no abnormal wound healing, no easy bruising or bleeding, no chest wall deformities, no dental crowding, normal stature.       ASSESSMENT/PLAN:    SCAD: differential includes collagen vascular disorder and/or fibromuscular dysplasia, discussed CT scan results are not very sensitive and can only detect when FMD has progressed to the point of becoming visual to the naked eye by CT scan however genetic changes and alterations of blood  flow can be occurring within the blood vessel wall itself (webs). Stress alone does not induce events, and usually a genetic predisposition is present. Would recommend genetic testing for our special SCAD panel unique to our clinic, as well as TAAD testing. We discussed overlap diagnoses that cause both SCAD and aortic enlargement. Will continue aspirin for now. Not on beta blocker given lack of htn. No need for statin per pt preference.    2.  Aortic dilation (mild): will plan for annual surveillance, TAAD panel as above    3.  Possible FMD: will obtain renal artery ultrasound     Follow up yearly with me. Results to be discussed via mychart.    Isabel Tee MD [facility]          PAST MEDICAL HISTORY  Past Medical History:   Diagnosis Date    Coronary artery dissection 01/03/2023    Left circumflex    Unspecified sinusitis (chronic)        CURRENT MEDICATIONS  Current Outpatient Medications   Medication Sig Dispense Refill    aspirin (ASA) 81 MG EC tablet Take 1 tablet (81 mg) by mouth daily 90 tablet 3    metoprolol tartrate (LOPRESSOR) 25 MG tablet Take 1 tablet (25 mg) by mouth 2 times daily 90 tablet 3       PAST SURGICAL HISTORY:  Past Surgical History:   Procedure Laterality Date    COLONOSCOPY  8/11/2015    Dr. No [facility]    COLONOSCOPY N/A 8/11/2015    Procedure: COLONOSCOPY;  Surgeon: Jake No MD;  Location:  GI    CV CORONARY ANGIOGRAM N/A 1/3/2023    Procedure: Coronary Angiogram;  Surgeon: Chong Schmitz MD;  Location:  HEART CARDIAC CATH LAB    CV LEFT HEART CATH N/A 1/3/2023    Procedure: Left Heart Catheterization;  Surgeon: Chong Schmitz MD;  Location:  HEART CARDIAC CATH LAB    ZZC NONSPECIFIC PROCEDURE  2001    sinus op       ALLERGIES     Allergies   Allergen Reactions    No Known Drug Allergy        FAMILY HISTORY  Family History   Problem Relation Age of Onset    Cancer Father         kidney    Respiratory Father         emphysema    Diabetes Father     Colon Cancer No  family hx of          SOCIAL HISTORY  Social History     Socioeconomic History    Marital status:      Spouse name: Willie    Number of children: 0    Years of education: 16    Highest education level: Not on file   Occupational History    Occupation: teacher   Tobacco Use    Smoking status: Never    Smokeless tobacco: Never   Vaping Use    Vaping Use: Never used   Substance and Sexual Activity    Alcohol use: No     Comment: maybe 1 time per year    Drug use: No    Sexual activity: Yes     Partners: Male   Other Topics Concern    Parent/sibling w/ CABG, MI or angioplasty before 65F 55M? Not Asked   Social History Narrative    Not on file     Social Determinants of Health     Financial Resource Strain: Low Risk  (1/17/2022)    Overall Financial Resource Strain (CARDIA)     Difficulty of Paying Living Expenses: Not very hard   Food Insecurity: No Food Insecurity (1/17/2022)    Hunger Vital Sign     Worried About Running Out of Food in the Last Year: Never true     Ran Out of Food in the Last Year: Never true   Transportation Needs: No Transportation Needs (1/17/2022)    PRAPARE - Transportation     Lack of Transportation (Medical): No     Lack of Transportation (Non-Medical): No   Physical Activity: Inactive (1/17/2022)    Exercise Vital Sign     Days of Exercise per Week: 0 days     Minutes of Exercise per Session: 0 min   Stress: No Stress Concern Present (1/17/2022)    [place] [employer] - Occupational Stress Questionnaire     Feeling of Stress : Only a little   Social Connections: Moderately Isolated (1/17/2022)    Social Connection and Isolation Panel [NHANES]     Frequency of Communication with Friends and Family: Once a week     Frequency of Social Gatherings with Friends and Family: Never     Attends [employer] Services: More than 4 times per year     Active Member of Clubs or Organizations: No     Attends Club or Organization Meetings: Not on file     Marital Status:   "  Intimate Partner Violence: Not on file   Housing Stability: Low Risk  (1/17/2022)    Housing Stability Vital Sign     Unable to Pay for Housing in the Last Year: No     Number of Places Lived in the Last Year: 1     Unstable Housing in the Last Year: No       ROS:   Constitutional: No fever, chills, or sweats. No weight gain/loss   ENT: No visual disturbance, ear ache, epistaxis, sore throat  Allergies/Immunologic: Negative  Respiratory: No cough, hemoptysia  Cardiovascular: As per HPI  GI: No nausea, vomiting, hematemesis, melena, or hematochezia  : No urinary frequency, dysuria, or hematuria  Integument: Negative  Psychiatric: Negative  Neuro: Negative  Endocrinology: Negative   Musculoskeletal: Negative  Vascular: No walking impairment, claudication, ischemic rest pain or nonhealing wounds    EXAM:  /77   Pulse 76   Ht 1.588 m (5' 2.5\")   Wt 90.7 kg (200 lb)   LMP  (LMP Unknown)   SpO2 96%   BMI 36.00 kg/m    In general, the patient is a pleasant female in no apparent distress.    HEENT: NC/AT.  PERRLA.  EOMI.    Neck: No adenopathy.  No thyromegaly. Carotids +2/2 bilaterally without bruits.  No jugular venous distension.   Heart: RRR. Normal S1, S2 splits physiologically. No murmur, rub, click, or gallop.   Lungs: CTA.  No ronchi, wheezes, rales.  No dullness to percussion.   Abdomen: Soft, nontender, nondistended. No organomegaly. No AAA.  No bruits.   Extremities: No clubbing, cyanosis, or edema.  No wounds. No varicose veins signs of chronic venous insufficiency.   Vascular: No bruits are noted.    Labs:  LIPID RESULTS:  Lab Results   Component Value Date    CHOL 159 01/03/2023    CHOL 186 07/22/2019    HDL 44 (L) 01/03/2023    HDL 45 (L) 07/22/2019    LDL 89 01/03/2023     (H) 07/22/2019    TRIG 132 01/03/2023    TRIG 179 (H) 07/22/2019    CHOLHDLRATIO 3.8 07/21/2015    NHDL 115 01/03/2023    NHDL 141 (H) 07/22/2019       LIVER ENZYME RESULTS:  Lab Results   Component Value Date    AST " 47 (H) 01/05/2023    AST 25 11/21/2006    ALT 25 01/05/2023    ALT 24 11/21/2006       CBC RESULTS:  Lab Results   Component Value Date    WBC 11.2 (H) 01/05/2023    WBC 9.5 11/21/2006    RBC 4.08 01/05/2023    RBC 4.59 11/21/2006    HGB 12.4 01/05/2023    HGB 13.9 11/21/2006    HCT 38.0 01/05/2023    HCT 40.0 11/21/2006    MCV 93 01/05/2023    MCV 87 11/21/2006    MCH 30.4 01/05/2023    MCH 30.3 11/21/2006    MCHC 32.6 01/05/2023    MCHC 34.8 11/21/2006    RDW 13.6 01/05/2023    RDW 14.0 11/21/2006     01/05/2023     11/21/2006       BMP RESULTS:  Lab Results   Component Value Date     01/05/2023     11/21/2006    POTASSIUM 4.0 01/07/2023    POTASSIUM 3.9 11/21/2006    CHLORIDE 106 01/05/2023    CHLORIDE 102 11/21/2006    CO2 26 01/05/2023    CO2 26 11/21/2006    ANIONGAP 8 01/05/2023    ANIONGAP 12 11/21/2006    GLC 99 01/05/2023    GLC 85 07/22/2019    BUN 13 01/05/2023    BUN 12 11/21/2006    CR 0.61 01/05/2023    CR 0.80 11/21/2006    GFRESTIMATED >90 01/05/2023    GFRESTIMATED 84 11/21/2006    GFRESTBLACK  11/21/2006     >90  Stages of Chronic Kidney Disease  Stage 1:  GFR 90 or greater and other evidence of kidney damage*  Stage 2:  GFR 60-89 and other evidence of kidney damage *  Stage 3:  GFR 30-59  Stage 4:  GFR 15-29  Stage 5:  GFR less than 15 or dialysis  *Chronic kidney disease is defined as kidney damage or GFR less than 60   mL/min/1.73 m2 for three months or greater.  Kidney damage is defined as   pathologic abnormalities or markers or damage, including abnormalities in blood   or urine tests or imaging studies.    JARRET 8.8 01/05/2023    JARRET 9.1 11/21/2006        A1C RESULTS:  Lab Results   Component Value Date    A1C 5.8 (H) 01/03/2023

## 2023-08-25 NOTE — LETTER
8/25/2023    HERVE CORDOBA, APRN CNP  [address] [place] [address]    RE: Palak Eckert       Dear Colleague,     I had the pleasure of seeing Palak VENECIA Eckert in the [facility].           Vascular Cardiology Consultation      HPI:     She is a very pleasant 57-year-old female with no PMH here for follow up of SCAD event. SHe was referred by Dr. Amor.    She presented to [facility] on January with complaints of chest discomfort. Her ECG showed nonspecific ST-T changes and her troponin was elevated therefore she proceeded to have coronary angiography.     It revealed occluded circumflex from the ostium with faint left to left collaterals distally. 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 The left main, LAD and RCA were free of significant disease. Given concern for possible retrograde progression of the ostial circumflex dissection to the left main, the patient was transferred to [facility] where she was monitored for several more days. She was eventually discharged on dual antiplatelet therapy, statin and beta-blocker.     She since then has had no significant event. She denies chest pain, SOB.     Family history reviewed.   No h/o sudden death, aneurysms, stroke, MI. Parents are old age.  Twin daughters, healthy.     Had job stress leading to event. She is a .    No history of HTN. On metoprolol now off. Off plavix now. On aspirin.     Aorta 4.0 cm on surveillance imaging. No fam h/o aortic disease.    On ROS: no lens dislocation, normal eye-width, normal uvula, normal palate, no hypermobility in thumb joints, no skin translucency, normal skin, no vaginal prolapse or hernias, no abnormal wound healing, no easy bruising or bleeding, no chest wall deformities, no dental crowding, normal stature.       ASSESSMENT/PLAN:    SCAD: differential includes collagen vascular disorder  and/or fibromuscular dysplasia, discussed CT scan results are not very sensitive and can only detect when FMD has progressed to the point of becoming visual to the naked eye by CT scan however genetic changes and alterations of blood flow can be occurring within the blood vessel wall itself (webs). Stress alone does not induce events, and usually a genetic predisposition is present. Would recommend genetic testing for our special SCAD panel unique to our clinic, as well as TAAD testing. We discussed overlap diagnoses that cause both SCAD and aortic enlargement. Will continue aspirin for now. Not on beta blocker given lack of htn. No need for statin per pt preference.    2.  Aortic dilation (mild): will plan for annual surveillance, TAAD panel as above    3.  Possible FMD: will obtain renal artery ultrasound     Follow up yearly with me. Results to be discussed via mychart.    Isabel Tee MD MSC          PAST MEDICAL HISTORY  Past Medical History:   Diagnosis Date    Coronary artery dissection 01/03/2023    Left circumflex    Unspecified sinusitis (chronic)        CURRENT MEDICATIONS  Current Outpatient Medications   Medication Sig Dispense Refill    aspirin (ASA) 81 MG EC tablet Take 1 tablet (81 mg) by mouth daily 90 tablet 3    metoprolol tartrate (LOPRESSOR) 25 MG tablet Take 1 tablet (25 mg) by mouth 2 times daily 90 tablet 3       PAST SURGICAL HISTORY:  Past Surgical History:   Procedure Laterality Date    COLONOSCOPY  8/11/2015    Dr. No [facility]    COLONOSCOPY N/A 8/11/2015    Procedure: COLONOSCOPY;  Surgeon: Jake No MD;  Location:  GI    CV CORONARY ANGIOGRAM N/A 1/3/2023    Procedure: Coronary Angiogram;  Surgeon: Chong Schmitz MD;  Location:  HEART CARDIAC CATH LAB    CV LEFT HEART CATH N/A 1/3/2023    Procedure: Left Heart Catheterization;  Surgeon: Chong Schmitz MD;  Location:  HEART CARDIAC CATH LAB    ZZC NONSPECIFIC PROCEDURE  2001    sinus op       ALLERGIES      Allergies   Allergen Reactions    No Known Drug Allergy        FAMILY HISTORY  Family History   Problem Relation Age of Onset    Cancer Father         kidney    Respiratory Father         emphysema    Diabetes Father     Colon Cancer No family hx of          SOCIAL HISTORY  Social History     Socioeconomic History    Marital status:      Spouse name: Willie    Number of children: 0    Years of education: 16    Highest education level: Not on file   Occupational History    Occupation: teacher   Tobacco Use    Smoking status: Never    Smokeless tobacco: Never   Vaping Use    Vaping Use: Never used   Substance and Sexual Activity    Alcohol use: No     Comment: maybe 1 time per year    Drug use: No    Sexual activity: Yes     Partners: Male   Other Topics Concern    Parent/sibling w/ CABG, MI or angioplasty before 65F 55M? Not Asked   Social History Narrative    Not on file     Social Determinants of Health     Financial Resource Strain: Low Risk  (1/17/2022)    Overall Financial Resource Strain (CARDIA)     Difficulty of Paying Living Expenses: Not very hard   Food Insecurity: No Food Insecurity (1/17/2022)    Hunger Vital Sign     Worried About Running Out of Food in the Last Year: Never true     Ran Out of Food in the Last Year: Never true   Transportation Needs: No Transportation Needs (1/17/2022)    PRAPARE - Transportation     Lack of Transportation (Medical): No     Lack of Transportation (Non-Medical): No   Physical Activity: Inactive (1/17/2022)    Exercise Vital Sign     Days of Exercise per Week: 0 days     Minutes of Exercise per Session: 0 min   Stress: No Stress Concern Present (1/17/2022)    [employer] - Occupational Stress Questionnaire     Feeling of Stress : Only a little   Social Connections: Moderately Isolated (1/17/2022)    Social Connection and Isolation Panel [NHANES]     Frequency of Communication with Friends and Family: Once a week     Frequency of Social  "Gatherings with Friends and Family: Never     Attends Mormonism Services: More than 4 times per year     Active Member of Clubs or Organizations: No     Attends Club or Organization Meetings: Not on file     Marital Status:    Intimate Partner Violence: Not on file   Housing Stability: Low Risk  (1/17/2022)    Housing Stability Vital Sign     Unable to Pay for Housing in the Last Year: No     Number of Places Lived in the Last Year: 1     Unstable Housing in the Last Year: No       ROS:   Constitutional: No fever, chills, or sweats. No weight gain/loss   ENT: No visual disturbance, ear ache, epistaxis, sore throat  Allergies/Immunologic: Negative  Respiratory: No cough, hemoptysia  Cardiovascular: As per HPI  GI: No nausea, vomiting, hematemesis, melena, or hematochezia  : No urinary frequency, dysuria, or hematuria  Integument: Negative  Psychiatric: Negative  Neuro: Negative  Endocrinology: Negative   Musculoskeletal: Negative  Vascular: No walking impairment, claudication, ischemic rest pain or nonhealing wounds    EXAM:  /77   Pulse 76   Ht 1.588 m (5' 2.5\")   Wt 90.7 kg (200 lb)   LMP  (LMP Unknown)   SpO2 96%   BMI 36.00 kg/m    In general, the patient is a pleasant female in no apparent distress.    HEENT: NC/AT.  PERRLA.  EOMI.    Neck: No adenopathy.  No thyromegaly. Carotids +2/2 bilaterally without bruits.  No jugular venous distension.   Heart: RRR. Normal S1, S2 splits physiologically. No murmur, rub, click, or gallop.   Lungs: CTA.  No ronchi, wheezes, rales.  No dullness to percussion.   Abdomen: Soft, nontender, nondistended. No organomegaly. No AAA.  No bruits.   Extremities: No clubbing, cyanosis, or edema.  No wounds. No varicose veins signs of chronic venous insufficiency.   Vascular: No bruits are noted.    Labs:  LIPID RESULTS:  Lab Results   Component Value Date    CHOL 159 01/03/2023    CHOL 186 07/22/2019    HDL 44 (L) 01/03/2023    HDL 45 (L) 07/22/2019    LDL 89 " 01/03/2023     (H) 07/22/2019    TRIG 132 01/03/2023    TRIG 179 (H) 07/22/2019    CHOLHDLRATIO 3.8 07/21/2015    NHDL 115 01/03/2023    NHDL 141 (H) 07/22/2019       LIVER ENZYME RESULTS:  Lab Results   Component Value Date    AST 47 (H) 01/05/2023    AST 25 11/21/2006    ALT 25 01/05/2023    ALT 24 11/21/2006       CBC RESULTS:  Lab Results   Component Value Date    WBC 11.2 (H) 01/05/2023    WBC 9.5 11/21/2006    RBC 4.08 01/05/2023    RBC 4.59 11/21/2006    HGB 12.4 01/05/2023    HGB 13.9 11/21/2006    HCT 38.0 01/05/2023    HCT 40.0 11/21/2006    MCV 93 01/05/2023    MCV 87 11/21/2006    MCH 30.4 01/05/2023    MCH 30.3 11/21/2006    MCHC 32.6 01/05/2023    MCHC 34.8 11/21/2006    RDW 13.6 01/05/2023    RDW 14.0 11/21/2006     01/05/2023     11/21/2006       BMP RESULTS:  Lab Results   Component Value Date     01/05/2023     11/21/2006    POTASSIUM 4.0 01/07/2023    POTASSIUM 3.9 11/21/2006    CHLORIDE 106 01/05/2023    CHLORIDE 102 11/21/2006    CO2 26 01/05/2023    CO2 26 11/21/2006    ANIONGAP 8 01/05/2023    ANIONGAP 12 11/21/2006    GLC 99 01/05/2023    GLC 85 07/22/2019    BUN 13 01/05/2023    BUN 12 11/21/2006    CR 0.61 01/05/2023    CR 0.80 11/21/2006    GFRESTIMATED >90 01/05/2023    GFRESTIMATED 84 11/21/2006    GFRESTBLACK  11/21/2006     >90  Stages of Chronic Kidney Disease  Stage 1:  GFR 90 or greater and other evidence of kidney damage*  Stage 2:  GFR 60-89 and other evidence of kidney damage *  Stage 3:  GFR 30-59  Stage 4:  GFR 15-29  Stage 5:  GFR less than 15 or dialysis  *Chronic kidney disease is defined as kidney damage or GFR less than 60   mL/min/1.73 m2 for three months or greater.  Kidney damage is defined as   pathologic abnormalities or markers or damage, including abnormalities in blood   or urine tests or imaging studies.    JARRET 8.8 01/05/2023    JARRET 9.1 11/21/2006        A1C RESULTS:  Lab Results   Component Value Date    A1C 5.8 (H) 01/03/2023            Thank you for allowing me to participate in the care of your patient.      Sincerely,     Isabel Tee MD     [facility]  cc:   John Amor MD  [address]  [place] [address]

## 2023-08-30 ENCOUNTER — HOSPITAL ENCOUNTER (OUTPATIENT)
Dept: ULTRASOUND IMAGING | Facility: CLINIC | Age: 58
Discharge: HOME | End: 2023-08-30
Attending: INTERNAL MEDICINE | Admitting: INTERNAL MEDICINE
Payer: COMMERCIAL

## 2023-08-30 DIAGNOSIS — I77.3 FIBROMUSCULAR DYSPLASIA (H): ICD-10-CM

## 2023-08-30 PROCEDURE — 76770 US EXAM ABDO BACK WALL COMP: CPT | Mod: XU

## 2023-09-05 ENCOUNTER — THERAPY VISIT (OUTPATIENT)
Dept: PHYSICAL THERAPY | Facility: CLINIC | Age: 58
End: 2023-09-05
Payer: COMMERCIAL

## 2023-09-05 ENCOUNTER — TELEPHONE (OUTPATIENT)
Dept: PEDIATRICS | Facility: CLINIC | Age: 58
End: 2023-09-05

## 2023-09-05 DIAGNOSIS — M25.562 CHRONIC PAIN OF BOTH KNEES: Primary | ICD-10-CM

## 2023-09-05 DIAGNOSIS — M25.561 CHRONIC PAIN OF BOTH KNEES: Primary | ICD-10-CM

## 2023-09-05 DIAGNOSIS — G89.29 CHRONIC PAIN OF BOTH KNEES: Primary | ICD-10-CM

## 2023-09-05 PROCEDURE — 97110 THERAPEUTIC EXERCISES: CPT | Mod: GP | Performed by: PHYSICAL THERAPIST

## 2023-09-05 NOTE — TELEPHONE ENCOUNTER
Orthopedic referral placed for persistent pain despite physical therapy.    Jennifer Hinojosa MD  Internal Medicine & Pediatrics  [facility]  She/her

## 2023-09-15 NOTE — PROGRESS NOTES
ASSESSMENT & PLAN         Today we discussed the underlying etiology/pathology of patient's   1. Primary osteoarthritis of knees, bilateral    2. Coronary artery dissection    3. Class 2 severe obesity due to excess calories with serious comorbidity and body mass index (BMI) of 36.0 to 36.9 in adult (H)      -We discussed the patient's medial sided bilateral knee pain is related to her progressive osteoarthritis.  We thoroughly discussed her x-rays from 2022 as well as compared them to today showing progress of arthritis with medial joint line collapse as well as osteophyte formation  - We discussed treatment options in regards to osteoarthritis management long-term including benign neglect, oral agents to decrease inflammation such as ibuprofen products in conjunction with Tylenol, continuation of knee strengthening program with physical therapy/home exercise program, bracing, intra-articular injections including cortisone, viscosupplementation and PRP, appropriate BMI to minimize stress on joints, activity modification and ultimately surgery if conservative treatments fail  - Patient at this time would like to proceed only with increasing her usage of Tylenol and declines further intervention at this time  - If patient wishes to proceed with intra-articular injection therapy in the future I be more than happy to see her.  She understands that if she would like viscosupplementation injections that this needs to be prior authorized by her insurance company prior to office visit.    -Call direct clinic number [[phone]] at any time with questions or concerns in regards to your recent office visit with me.     Dandy Rhoades PA-C  [facility]      SUBJECTIVE  Palak Eckert is a/an 58 year old female who is seen in consultation at the request of  Jennifer Hinojosa M.D. for evaluation of bilateral knee pain. The patient is seen by themselves. Previous OV with Dr. Strange on 4/6/22.  Patient began PT for Subhash Knees on 7/28/23.   Patient states physical therapy has not been beneficial and has been discharged formally but will continue home exercise size program.  Both knees are relatively equal in severity but occasionally the right knee is more symptomatic.  Pain is isolated only to the medial side of both knees.  No history of injury or trauma.  Symptoms began after playing tennis.  Symptoms have changed since initial onset with initial pain being in the posterior thighs as well as over the anterior knees.  The symptoms have fully resolved and now she has pain isolated to the medial side of both knees.  She rarely takes oral medication and will take maybe 2 doses of Tylenol a week.  No other significant interventions have been utilized.    Onset: several years(s) ago. Reports insidious onset without acute precipitating event. Patient recalls initial injury as playing tennis four days in a row several years ago.  X-rays were obtained single AP view in 2022 of the knees.  Location of Pain: bilateral knees - medial aspect  Rating of Pain at worst: 9/10  Rating of Pain Currently: 2/10  Worsened by: sitting/laying down for long periods  Better with: mild with treatments tried, no problems with movement  Treatments tried: rest/activity avoidance, ice, Tylenol, physical therapy (5 visits)  Quality: aching, dull, nagging, stiffness - constant daily  Associated symptoms: no distal numbness or tingling  Orthopedic history: NO  Relevant surgical history: NO  Social history: social history: works at , biking, walking    Past Medical History:   Diagnosis Date    Coronary artery dissection 01/03/2023    Left circumflex    Unspecified sinusitis (chronic)      Social History     Socioeconomic History    Marital status:      Spouse name: Bill    Number of children: 0    Years of education: 16   Occupational History    Occupation: teacher   Tobacco Use    Smoking status: Never     Smokeless tobacco: Never   Vaping Use    Vaping Use: Never used   Substance and Sexual Activity    Alcohol use: No     Comment: maybe 1 time per year    Drug use: No    Sexual activity: Yes     Partners: Male     Social Determinants of Health     Financial Resource Strain: Low Risk  (1/17/2022)    Overall Financial Resource Strain (CARDIA)     Difficulty of Paying Living Expenses: Not very hard   Food Insecurity: No Food Insecurity (1/17/2022)    Hunger Vital Sign     Worried About Running Out of Food in the Last Year: Never true     Ran Out of Food in the Last Year: Never true   Transportation Needs: No Transportation Needs (1/17/2022)    PRAPARE - Transportation     Lack of Transportation (Medical): No     Lack of Transportation (Non-Medical): No   Physical Activity: Inactive (1/17/2022)    Exercise Vital Sign     Days of Exercise per Week: 0 days     Minutes of Exercise per Session: 0 min   Stress: No Stress Concern Present (1/17/2022)    [employer] [place] [employer] - Occupational Stress Questionnaire     Feeling of Stress : Only a little   Social Connections: Moderately Isolated (1/17/2022)    Social Connection and Isolation Panel [NHANES]     Frequency of Communication with Friends and Family: Once a week     Frequency of Social Gatherings with Friends and Family: Never     Attends Anabaptism Services: More than 4 times per year     Active Member of Clubs or Organizations: No     Marital Status:    Housing Stability: Low Risk  (1/17/2022)    Housing Stability Vital Sign     Unable to Pay for Housing in the Last Year: No     Number of Places Lived in the Last Year: 1     Unstable Housing in the Last Year: No         Patient's past medical, surgical, social, and family histories were personally reviewed today and no changes are noted.    REVIEW OF SYSTEMS:  10 point ROS is negative other than symptoms noted above in HPI, Past Medical History or as stated below  Constitutional: NEGATIVE for  fever, chills, change in weight  Skin: NEGATIVE for worrisome rashes, moles or lesions  GI/: NEGATIVE for bowel or bladder changes  Neuro: NEGATIVE for weakness, dizziness or paresthesias    OBJECTIVE:  LMP  (LMP Unknown)    General: healthy, alert and in no distress  HEENT: no scleral icterus or conjunctival erythema  Skin: no suspicious lesions or rash. No jaundice.  CV: no pedal edema  Resp: normal respiratory effort without conversational dyspnea   Psych: normal mood and affect  Gait: normal steady gait with appropriate coordination and balance  Neuro: Normal light sensory exam of lower extremity      MSK:  Exam shows a well-nourished 58-year-old female ambulates full weightbearing without assistive device.  No significant antalgia.  Patient has full knee extension bilaterally.  No significant crepitation.  Right knee is stiff compared to the left knee in regards to terminal flexion but knee motion is still within normal limits.  Patient is tender along the medial joint line to palpation bilaterally with no pain laterally.  Ligament exam is stable.  Still has normal anatomical alignment.  Circumduction maneuvers are negative.  Patient has full active and passive range of motion of both hips.  No effusions of either knee.  No excessive warmth.  No significant peripheral edema.  Patient is neurovascularly intact L2-S1 bilaterally.  Hip flexion against resistance does not generate pain.  Drawer testing is stable.        Independent visualization of the below image:  Three-view x-rays of the patient's knees bilaterally are reviewed today and compared to AP view of 2022 showing progressive osteoarthritis of the medial compartment of both knees with left knee grade 3 medial compartment and right knee grade 2-grade 3.  Early osteophyte formation is noted over the medial compartments.  Patellas are seated centrally.  No evidence of fracture or dislocation.    Patient's conditions were thoroughly discussed during  today's visit with total time spent face-to-face with the patient and documentation being 45 minutes.    Dandy Rhoades PA-C  [facility]

## 2023-09-26 PROBLEM — M25.562 PAIN IN BOTH KNEES: Status: RESOLVED | Noted: 2023-08-03 | Resolved: 2023-09-26

## 2023-09-26 PROBLEM — M25.561 PAIN IN BOTH KNEES: Status: RESOLVED | Noted: 2023-08-03 | Resolved: 2023-09-26

## 2023-09-26 NOTE — PROGRESS NOTES
DISCHARGE  Reason for Discharge: No further expectation of progress.    Equipment Issued: none    Discharge Plan: Patient to continue home program.  Dr. Hinojosa entered a referral to orthopedics and her office left a VM for the patient.      Referring Provider:  Jennifer Hinojosa

## 2023-10-04 ENCOUNTER — ANCILLARY PROCEDURE (OUTPATIENT)
Dept: GENERAL RADIOLOGY | Facility: CLINIC | Age: 58
End: 2023-10-04
Attending: PHYSICIAN ASSISTANT
Payer: COMMERCIAL

## 2023-10-04 ENCOUNTER — OFFICE VISIT (OUTPATIENT)
Dept: ORTHOPEDICS | Facility: CLINIC | Age: 58
End: 2023-10-04
Attending: STUDENT IN AN ORGANIZED HEALTH CARE EDUCATION/TRAINING PROGRAM
Payer: COMMERCIAL

## 2023-10-04 VITALS
BODY MASS INDEX: 35.08 KG/M2 | DIASTOLIC BLOOD PRESSURE: 75 MMHG | WEIGHT: 198 LBS | SYSTOLIC BLOOD PRESSURE: 120 MMHG | HEIGHT: 63 IN

## 2023-10-04 DIAGNOSIS — I25.42 CORONARY ARTERY DISSECTION: ICD-10-CM

## 2023-10-04 DIAGNOSIS — M17.0 PRIMARY OSTEOARTHRITIS OF KNEES, BILATERAL: Primary | ICD-10-CM

## 2023-10-04 DIAGNOSIS — M25.561 CHRONIC PAIN OF BOTH KNEES: ICD-10-CM

## 2023-10-04 DIAGNOSIS — E66.01 CLASS 2 SEVERE OBESITY DUE TO EXCESS CALORIES WITH SERIOUS COMORBIDITY AND BODY MASS INDEX (BMI) OF 36.0 TO 36.9 IN ADULT (H): ICD-10-CM

## 2023-10-04 DIAGNOSIS — G89.29 CHRONIC PAIN OF BOTH KNEES: ICD-10-CM

## 2023-10-04 DIAGNOSIS — E66.812 CLASS 2 SEVERE OBESITY DUE TO EXCESS CALORIES WITH SERIOUS COMORBIDITY AND BODY MASS INDEX (BMI) OF 36.0 TO 36.9 IN ADULT (H): ICD-10-CM

## 2023-10-04 DIAGNOSIS — M25.562 CHRONIC PAIN OF BOTH KNEES: ICD-10-CM

## 2023-10-04 PROCEDURE — 73562 X-RAY EXAM OF KNEE 3: CPT | Mod: TC | Performed by: RADIOLOGY

## 2023-10-04 PROCEDURE — 99215 OFFICE O/P EST HI 40 MIN: CPT | Performed by: PHYSICIAN ASSISTANT

## 2023-10-04 NOTE — LETTER
10/4/2023         RE: Palak Eckert  [address]  [facility] [address]        Dear Colleague,    Thank you for referring your patient, Palak Eckert, to the [facility] [place]. Please see a copy of my visit note below.    ASSESSMENT & PLAN         Today we discussed the underlying etiology/pathology of patient's   1. Primary osteoarthritis of knees, bilateral    2. Coronary artery dissection    3. Class 2 severe obesity due to excess calories with serious comorbidity and body mass index (BMI) of 36.0 to 36.9 in adult (H)      -We discussed the patient's medial sided bilateral knee pain is related to her progressive osteoarthritis.  We thoroughly discussed her x-rays from 2022 as well as compared them to today showing progress of arthritis with medial joint line collapse as well as osteophyte formation  - We discussed treatment options in regards to osteoarthritis management long-term including benign neglect, oral agents to decrease inflammation such as ibuprofen products in conjunction with Tylenol, continuation of knee strengthening program with physical therapy/home exercise program, bracing, intra-articular injections including cortisone, viscosupplementation and PRP, appropriate BMI to minimize stress on joints, activity modification and ultimately surgery if conservative treatments fail  - Patient at this time would like to proceed only with increasing her usage of Tylenol and declines further intervention at this time  - If patient wishes to proceed with intra-articular injection therapy in the future I be more than happy to see her.  She understands that if she would like viscosupplementation injections that this needs to be prior authorized by her insurance company prior to office visit.    -Call direct clinic number [[phone]] at any time with questions or concerns in regards to your recent office visit with me.     Dandy Rhoades PA-C  [facility]      SUBJECTIVE  Palak Eckert is a/an 58 year old female who is seen in consultation at the request of  Jennifer Hinojosa M.D. for evaluation of bilateral knee pain. The patient is seen by themselves. Previous OV with Dr. Strange on 4/6/22. Patient began PT for Subhash Knees on 7/28/23.   Patient states physical therapy has not been beneficial and has been discharged formally but will continue home exercise size program.  Both knees are relatively equal in severity but occasionally the right knee is more symptomatic.  Pain is isolated only to the medial side of both knees.  No history of injury or trauma.  Symptoms began after playing tennis.  Symptoms have changed since initial onset with initial pain being in the posterior thighs as well as over the anterior knees.  The symptoms have fully resolved and now she has pain isolated to the medial side of both knees.  She rarely takes oral medication and will take maybe 2 doses of Tylenol a week.  No other significant interventions have been utilized.    Onset: several years(s) ago. Reports insidious onset without acute precipitating event. Patient recalls initial injury as playing tennis four days in a row several years ago.  X-rays were obtained single AP view in 2022 of the knees.  Location of Pain: bilateral knees - medial aspect  Rating of Pain at worst: 9/10  Rating of Pain Currently: 2/10  Worsened by: sitting/laying down for long periods  Better with: mild with treatments tried, no problems with movement  Treatments tried: rest/activity avoidance, ice, Tylenol, physical therapy (5 visits)  Quality: aching, dull, nagging, stiffness - constant daily  Associated symptoms: no distal numbness or tingling; denies swelling or warmth  Orthopedic history: NO  Relevant surgical history: NO  Social history: social history: works at , biking, walking    Past Medical History:   Diagnosis Date     Coronary artery dissection  01/03/2023    Left circumflex     Unspecified sinusitis (chronic)      Social History     Socioeconomic History     Marital status:      Spouse name: Bill     Number of children: 0     Years of education: 16   Occupational History     Occupation: teacher   Tobacco Use     Smoking status: Never     Smokeless tobacco: Never   Vaping Use     Vaping Use: Never used   Substance and Sexual Activity     Alcohol use: No     Comment: maybe 1 time per year     Drug use: No     Sexual activity: Yes     Partners: Male     Social Determinants of Health     Financial Resource Strain: Low Risk  (1/17/2022)    Overall Financial Resource Strain (CARDIA)      Difficulty of Paying Living Expenses: Not very hard   Food Insecurity: No Food Insecurity (1/17/2022)    Hunger Vital Sign      Worried About Running Out of Food in the Last Year: Never true      Ran Out of Food in the Last Year: Never true   Transportation Needs: No Transportation Needs (1/17/2022)    PRAPARE - Transportation      Lack of Transportation (Medical): No      Lack of Transportation (Non-Medical): No   Physical Activity: Inactive (1/17/2022)    Exercise Vital Sign      Days of Exercise per Week: 0 days      Minutes of Exercise per Session: 0 min   Stress: No Stress Concern Present (1/17/2022)    [employer] [place] [employer] - Occupational Stress Questionnaire      Feeling of Stress : Only a little   Social Connections: Moderately Isolated (1/17/2022)    Social Connection and Isolation Panel [NHANES]      Frequency of Communication with Friends and Family: Once a week      Frequency of Social Gatherings with Friends and Family: Never      Attends Islam Services: More than 4 times per year      Active Member of Clubs or Organizations: No      Marital Status:    Housing Stability: Low Risk  (1/17/2022)    Housing Stability Vital Sign      Unable to Pay for Housing in the Last Year: No      Number of Places Lived in the Last Year: 1       Unstable Housing in the Last Year: No         Patient's past medical, surgical, social, and family histories were personally reviewed today and no changes are noted.    REVIEW OF SYSTEMS:  10 point ROS is negative other than symptoms noted above in HPI, Past Medical History or as stated below  Constitutional: NEGATIVE for fever, chills, change in weight  Skin: NEGATIVE for worrisome rashes, moles or lesions  GI/: NEGATIVE for bowel or bladder changes  Neuro: NEGATIVE for weakness, dizziness or paresthesias    OBJECTIVE:  LMP  (LMP Unknown)    General: healthy, alert and in no distress  HEENT: no scleral icterus or conjunctival erythema  Skin: no suspicious lesions or rash. No jaundice.  CV: no pedal edema  Resp: normal respiratory effort without conversational dyspnea   Psych: normal mood and affect  Gait: normal steady gait with appropriate coordination and balance  Neuro: Normal light sensory exam of lower extremity      MSK:  Exam shows a well-nourished 58-year-old female ambulates full weightbearing without assistive device.  No significant antalgia.  Patient has full knee extension bilaterally.  No significant crepitation.  Right knee is stiff compared to the left knee in regards to terminal flexion but knee motion is still within normal limits.  Patient is tender along the medial joint line to palpation bilaterally with no pain laterally.  Ligament exam is stable.  Still has normal anatomical alignment.  Circumduction maneuvers are negative.  Patient has full active and passive range of motion of both hips.  No effusions of either knee.  No excessive warmth.  No significant peripheral edema.  Patient is neurovascularly intact L2-S1 bilaterally.  Hip flexion against resistance does not generate pain.  Drawer testing is stable.        Independent visualization of the below image:  Three-view x-rays of the patient's knees bilaterally are reviewed today and compared to AP view of 2022 showing progressive  osteoarthritis of the medial compartment of both knees with left knee grade 3 medial compartment and right knee grade 2-grade 3.  Early osteophyte formation is noted over the medial compartments.  Patellas are seated centrally.  No evidence of fracture or dislocation.    Patient's conditions were thoroughly discussed during today's visit with total time spent face-to-face with the patient and documentation being 45 minutes.    Dandy Rhoades PA-C  [facility]      Again, thank you for allowing me to participate in the care of your patient.        Sincerely,        Dandy Rhoades PA-C

## 2023-10-04 NOTE — PATIENT INSTRUCTIONS
Today we discussed the underlying etiology/pathology of patient's   1. Primary osteoarthritis of knees, bilateral    2. Coronary artery dissection    3. Class 2 severe obesity due to excess calories with serious comorbidity and body mass index (BMI) of 36.0 to 36.9 in adult (H)      -We discussed the patient's medial sided bilateral knee pain is related to her progressive osteoarthritis.  We thoroughly discussed her x-rays from 2022 as well as compared them to today showing progress of arthritis with medial joint line collapse as well as osteophyte formation  - We discussed treatment options in regards to osteoarthritis management long-term including benign neglect, oral agents to decrease inflammation such as ibuprofen products in conjunction with Tylenol, continuation of knee strengthening program with physical therapy/home exercise program, bracing, intra-articular injections including cortisone, viscosupplementation and PRP, appropriate BMI to minimize stress on joints, activity modification and ultimately surgery if conservative treatments fail  - Patient at this time would like to proceed only with increasing her usage of Tylenol and declines further intervention at this time  - If patient wishes to proceed with intra-articular injection therapy in the future I be more than happy to see her.  She understands that if she would like viscosupplementation injections that this needs to be prior authorized by her insurance company prior to office visit.    -Call direct clinic number [[phone]] at any time with questions or concerns in regards to your recent office visit with me.     Dandy Rhoades PA-C  [facility]

## 2023-11-21 ENCOUNTER — MYC MEDICAL ADVICE (OUTPATIENT)
Dept: ORTHOPEDICS | Facility: CLINIC | Age: 58
End: 2023-11-21
Payer: COMMERCIAL

## 2023-11-22 ENCOUNTER — IMMUNIZATION (OUTPATIENT)
Dept: PEDIATRICS | Facility: CLINIC | Age: 58
End: 2023-11-22
Payer: COMMERCIAL

## 2023-11-22 VITALS — BODY MASS INDEX: 34.44 KG/M2 | WEIGHT: 194.4 LBS

## 2023-11-22 DIAGNOSIS — Z23 NEED FOR PROPHYLACTIC VACCINATION AND INOCULATION AGAINST INFLUENZA: Primary | ICD-10-CM

## 2023-11-22 PROCEDURE — 99207 PR NO CHARGE NURSE ONLY: CPT

## 2023-11-22 PROCEDURE — 90686 IIV4 VACC NO PRSV 0.5 ML IM: CPT

## 2023-11-22 PROCEDURE — 90471 IMMUNIZATION ADMIN: CPT

## 2023-11-24 ENCOUNTER — MYC MEDICAL ADVICE (OUTPATIENT)
Dept: PEDIATRICS | Facility: CLINIC | Age: 58
End: 2023-11-24
Payer: COMMERCIAL

## 2023-11-24 DIAGNOSIS — G89.29 CHRONIC PAIN OF BOTH KNEES: Primary | ICD-10-CM

## 2023-11-24 DIAGNOSIS — M25.562 CHRONIC PAIN OF BOTH KNEES: Primary | ICD-10-CM

## 2023-11-24 DIAGNOSIS — M25.561 CHRONIC PAIN OF BOTH KNEES: Primary | ICD-10-CM

## 2023-11-24 NOTE — TELEPHONE ENCOUNTER
I called the patient today to discuss her concerns in more detail.  No answer.  Voicemail left asking patient to return my call.

## 2023-11-29 NOTE — TELEPHONE ENCOUNTER
Please see patient's request for a response via [employer] as she is unable to take phone calls during the day.    Floresita Chen, MIGUEL, LAT, ATC  Certified Athletic Trainer

## 2023-12-17 ENCOUNTER — HOSPITAL ENCOUNTER (OUTPATIENT)
Dept: MRI IMAGING | Facility: CLINIC | Age: 58
Discharge: HOME | End: 2023-12-17
Attending: STUDENT IN AN ORGANIZED HEALTH CARE EDUCATION/TRAINING PROGRAM | Admitting: STUDENT IN AN ORGANIZED HEALTH CARE EDUCATION/TRAINING PROGRAM
Payer: COMMERCIAL

## 2023-12-17 DIAGNOSIS — M25.561 CHRONIC PAIN OF BOTH KNEES: ICD-10-CM

## 2023-12-17 DIAGNOSIS — G89.29 CHRONIC PAIN OF BOTH KNEES: ICD-10-CM

## 2023-12-17 DIAGNOSIS — M25.562 CHRONIC PAIN OF BOTH KNEES: ICD-10-CM

## 2023-12-17 PROCEDURE — 73721 MRI JNT OF LWR EXTRE W/O DYE: CPT | Mod: 26 | Performed by: RADIOLOGY

## 2023-12-17 PROCEDURE — 73721 MRI JNT OF LWR EXTRE W/O DYE: CPT | Mod: RT

## 2023-12-20 NOTE — PROGRESS NOTES
Here is a copy of the progress note from your recent genetic counseling visit [facility] on Date: 2023.    PROGRESS NOTE:Palak was referred by Isabel Tee MD for genetic counseling due to her history of spontaneous coronary artery dissection (SCAD).  We had the opportunity to talk with Palak  today to discuss the genetic component of SCAD and testing options available to her .     MEDICAL HISTORY:Palak went to [facility] in 2023 with complaints of chest discomfort. Her ECG showed nonspecific ST-T changes and her troponin was elevated therefore she proceeded to have coronary angiography, which revealed a spontaneous coronary artery dissection (SCAD).  Follow up imaging also revealed dilated ascending aorta (4.0 cm).    She has no previous history of high blood pressure.      Due to possible association between SCAD and underlying connective tissue disorders, we discussed related symptoms.  Palak reports that she is nearsighted and has flat feet.  She has no history of lens dislocation, hypermobility, skin translucency, vaginal prolapse or hernias, abnormal wound healing, easy bruising or bleeding, chest wall deformities, or dental crowding and reports normal eye-width, normal uvula, palate, and stature.      FAMILY HISTORY:A detailed family history was obtained today and was significant for the following cardiac history:    Palak has twin daughters that are 19 years old. They are both healthy.   One brother is 63 years old. He had a heart murmur that was found as a child. He has no lasting complications. He has no children.   Mother  at age 96 from old age. She did not have a history of heart issues.   9 maternal aunts and uncles - One maternal uncle is 91 years old and healthy. The other 8 aunts and uncles have . One uncle  in his 50 s from, possibly, a heart attack. The other  aunts and uncles  due to old age.  No history of cardiac related issues in Palak s maternal cousins.   Maternal grandmother  at age 85 due to complications from a leg amputation. Maternal grandfather  at age 96 from either old age or cancer. The exact cause is unknown.   Father  at age 75 from complications due to emphysema, COPD, and kidney cancer. He had a history of smoking.   12 paternal aunts and uncles that are all . The exact cause and ages of death are unknown. However, there was a mention of potential cardiac issues in some of the paternal uncles. It is unknown if there is a history of cardiac related issues in any of Palak s paternal cousins.   Paternal grandmother  in her early 90 s from an unknown cause. Paternal grandfather  at an unknown age (older than 50) and from an unknown cause.   There is no additional history of cardiomyopathy, arrhythmias, heart attacks, fainting, sudden cardiac death, genetic conditions, or birth defects. (Scanned pedigree may be under media tab)    DISCUSSION:  Reviewed diagnosis of Spontaneous coronary artery dissection (SCAD),including how it occurs and related symptoms. SCAD most commonly affects women in their 40s and 50s, though it can occur at any age and can occur in men. SCAD can be caused by genetic and non-genetic factors.  Non-genetic factors include fibromuscular dysplasia (FMD). However, people who have SCAD often don't have risk factors for heart disease, such as high blood pressure, high cholesterol or diabetes.      Genetic causes for SCAD can include underlying connective tissue disorders (CTD).  One specific CTD related to increased risk fo SCAD is vascular Dorian Danlos Syndrome (vEDS), which is caused by a mutation in the COL3A1 gene.  Other collagen (COL) genes have recently been reported in conjunction with SCAD (COL3A1, COL5A1, COL4A1, COL6A1, COL5A2  [id number], COL4A5, COL1A2, [id number]) - Therefore, we discussed testing for the Connective Tissue Disorders Panel  through [facility], which includes 7 of the related COL genes (those in bold are not included).      Reviewed EDS and the many different types.  Explained that Vascular EDS (type IV) is a severe form, which has increased risk for aortic aneurysms and dissections.  Classic EDS and hypermobile EDS are usually less severe with hypermobility being a prominent feature.  Classic EDS can have other findings, especially involving the skin.  All of these forms of EDS are inherited in an autosomal dominant (AD) pattern.  Reviewed AD inheritance, including the 50% risk for recurrence.      Reviewed genetic testing for Vascular EDS and the yield of identifying a mutation (about 98%) in the COL3A1 gene. Genetic testing for hypermobile EDS is much lower (10-20%) and detection for classic EDS is somewhere in between.  Detection rate for other connective tissue disorders varies based on disease.      Explained three possible outcomes of genetic testing including: positive identification of a mutation, no mutation identified, and identification of a variant of unknown significance (VUS). If a mutation is identified, presymptomatic testing would be available to at risk family members. If no mutation is identified, it does not rule out the possibility of a genetic component to this disease. Family members could still be at risk for developing the same condition. If a VUS is identified, it is unclear if the mutation is disease causing or just a normal variation. It may take time and possibly additional testing to determine the meaning of a VUS result.      Reviewed capabilities, limitations, and logistics of testing. DNA sample via saliva or blood is collected and sent to testing lab for evaluation of selected genes. Turn around time for results of 2-4 weeks. Reviewed cost of testing thru commercial lab.  Explained that they will work with insurance carrier and notify patient if out of pocket costs exceed $100.      Discussed pros and cons  of genetic testing. Explained that results could significantly impact management and treatment decisions.  Reviewed possible issues associated with presymptomatic testing including genetic discrimination, current laws to prevent discrimination (ie. [place]), insurance issues, and emotional and psychosocial outcomes of testing.      Explained that clinical evaluation is recommended for all first degree relatives (parents, siblings, and children) of an affected individual regardless of decision to pursue genetic testing.  Due the risk of spontaneous arterial dissection associated with Vascular EDS an early diagnosis can be very beneficial for treatment and management decisions.       All questions answered at this time    PLAN:Palak elected to proceed with genetic testing.  Requisition and consent forms were completed and signed.  DNA will be collected via cheek swab sample and sent to [facility] laboratory. I will contact patient when results are available.    TOTAL TIME SPENT IN COUNSELIN minutes    Sanaz Rice  Genetic Counseling Student    Julianne Rodriguez MS, [facility]  Licensed, Certified Genetic Counselor  [facility]

## 2023-12-21 ENCOUNTER — OFFICE VISIT (OUTPATIENT)
Dept: CARDIOLOGY | Facility: CLINIC | Age: 58
End: 2023-12-21
Attending: INTERNAL MEDICINE
Payer: COMMERCIAL

## 2023-12-21 DIAGNOSIS — I77.3 FIBROMUSCULAR DYSPLASIA (H): ICD-10-CM

## 2023-12-21 DIAGNOSIS — I25.42 SPONTANEOUS DISSECTION OF CORONARY ARTERY: Primary | ICD-10-CM

## 2023-12-21 DIAGNOSIS — I77.810 DILATION OF THORACIC AORTA (H): ICD-10-CM

## 2023-12-21 PROCEDURE — 99207 PR NO CHARGE LOS: CPT | Performed by: GENETIC COUNSELOR, MS

## 2023-12-21 PROCEDURE — 96040 PR GENETIC COUNSELING, EACH 30 MIN: CPT | Performed by: GENETIC COUNSELOR, MS

## 2023-12-21 NOTE — LETTER
2023    Jennifer Hinojosa MD  [address] [facility] [address]    RE: Palak Eckert       Dear Colleague,     I had the pleasure of seeing Palak Eckert in the [facility].  Here is a copy of the progress note from your recent genetic counseling visit to [facility] on Date: 2023.    PROGRESS NOTE:Palak was referred by Isabel Tee MD for genetic counseling due to her history of spontaneous coronary artery dissection (SCAD).  We had the opportunity to talk with Palak  today to discuss the genetic component of SCAD and testing options available to her .     MEDICAL HISTORY:Palak went to [facility] in 2023 with complaints of chest discomfort. Her ECG showed nonspecific ST-T changes and her troponin was elevated therefore she proceeded to have coronary angiography, which revealed a spontaneous coronary artery dissection (SCAD).  Follow up imaging also revealed dilated ascending aorta (4.0 cm).    She has no previous history of high blood pressure.      Due to possible association between SCAD and underlying connective tissue disorders, we discussed related symptoms.  Palak reports that she is nearsighted and has flat feet.  She has no history of lens dislocation, hypermobility, skin translucency, vaginal prolapse or hernias, abnormal wound healing, easy bruising or bleeding, chest wall deformities, or dental crowding and reports normal eye-width, normal uvula, palate, and stature.      FAMILY HISTORY:A detailed family history was obtained today and was significant for the following cardiac history:    Palak has twin daughters that are 19 years old. They are both healthy.   One brother is 63 years old. He had a heart murmur that was found as a child. He has no lasting complications. He has no children.   Mother  at age 96 from old age. She did not have a history of heart issues.   9 maternal aunts and  uncles - One maternal uncle is 91 years old and healthy. The other 8 aunts and uncles have . One uncle  in his 50 s from, possibly, a heart attack. The other  aunts and uncles  due to old age. No history of cardiac related issues in Palak s maternal cousins.   Maternal grandmother  at age 85 due to complications from a leg amputation. Maternal grandfather  at age 96 from either old age or cancer. The exact cause is unknown.   Father  at age 75 from complications due to emphysema, COPD, and kidney cancer. He had a history of smoking.   12 paternal aunts and uncles that are all . The exact cause and ages of death are unknown. However, there was a mention of potential cardiac issues in some of the paternal uncles. It is unknown if there is a history of cardiac related issues in any of Palak s paternal cousins.   Paternal grandmother  in her early 90 s from an unknown cause. Paternal grandfather  at an unknown age (older than 50) and from an unknown cause.   There is no additional history of cardiomyopathy, arrhythmias, heart attacks, fainting, sudden cardiac death, genetic conditions, or birth defects. (Scanned pedigree may be under media tab)    DISCUSSION:  Reviewed diagnosis of Spontaneous coronary artery dissection (SCAD),including how it occurs and related symptoms. SCAD most commonly affects women in their 40s and 50s, though it can occur at any age and can occur in men. SCAD can be caused by genetic and non-genetic factors.  Non-genetic factors include fibromuscular dysplasia (FMD). However, people who have SCAD often don't have risk factors for heart disease, such as high blood pressure, high cholesterol or diabetes.      Genetic causes for SCAD can include underlying connective tissue disorders (CTD).  One specific CTD related to increased risk fo SCAD is vascular Dorian Danlos Syndrome (vEDS), which is caused by a mutation in the COL3A1 gene.  Other collagen  (COL) genes have recently been reported in conjunction with SCAD (COL3A1, COL5A1, COL4A1, COL6A1, COL5A2  NUV11Z5, COL4A5, COL1A2, OMB09Z7) - Therefore, we discussed testing for the Connective Tissue Disorders Panel through [facility], which includes 7 of the related COL genes (those in bold are not included).      Reviewed EDS and the many different types.  Explained that Vascular EDS (type IV) is a severe form, which has increased risk for aortic aneurysms and dissections.  Classic EDS and hypermobile EDS are usually less severe with hypermobility being a prominent feature.  Classic EDS can have other findings, especially involving the skin.  All of these forms of EDS are inherited in an autosomal dominant (AD) pattern.  Reviewed AD inheritance, including the 50% risk for recurrence.      Reviewed genetic testing for Vascular EDS and the yield of identifying a mutation (about 98%) in the COL3A1 gene. Genetic testing for hypermobile EDS is much lower (10-20%) and detection for classic EDS is somewhere in between.  Detection rate for other connective tissue disorders varies based on disease.      Explained three possible outcomes of genetic testing including: positive identification of a mutation, no mutation identified, and identification of a variant of unknown significance (VUS). If a mutation is identified, presymptomatic testing would be available to at risk family members. If no mutation is identified, it does not rule out the possibility of a genetic component to this disease. Family members could still be at risk for developing the same condition. If a VUS is identified, it is unclear if the mutation is disease causing or just a normal variation. It may take time and possibly additional testing to determine the meaning of a VUS result.      Reviewed capabilities, limitations, and logistics of testing. DNA sample via saliva or blood is collected and sent to testing lab for evaluation of selected genes. Turn  around time for results of 2-4 weeks. Reviewed cost of testing thru commercial lab.  Explained that they will work with insurance carrier and notify patient if out of pocket costs exceed $100.      Discussed pros and cons of genetic testing. Explained that results could significantly impact management and treatment decisions.  Reviewed possible issues associated with presymptomatic testing including genetic discrimination, current laws to prevent discrimination (ie. [place]ITNEY), insurance issues, and emotional and psychosocial outcomes of testing.      Explained that clinical evaluation is recommended for all first degree relatives (parents, siblings, and children) of an affected individual regardless of decision to pursue genetic testing.  Due the risk of spontaneous arterial dissection associated with Vascular EDS an early diagnosis can be very beneficial for treatment and management decisions.       All questions answered at this time    PLAN:Palak elected to proceed with genetic testing.  Requisition and consent forms were completed and signed.  DNA will be collected via cheek swab sample and sent to [facility] laboratory. I will contact patient when results are available.    TOTAL TIME SPENT IN COUNSELIN minutes    Sanaz Rice  Genetic Counseling Student    Julianne Rodriguez MS, [place]  Licensed, Certified Genetic Counselor  [facility]       Thank you for allowing me to participate in the care of your patient.      Sincerely,     Julianne Rodriguez GC     [facility]  cc:   Isabel Tee MD  [address] [place] [id number]

## 2023-12-21 NOTE — PATIENT INSTRUCTIONS
"Indication for Genetic Counseling:     Spontaneous coronary artery dissection (SCAD) occurs when a  tear forms in a blood vessel in the heart. SCAD can slow or block blood flow to the heart, causing a heart attack, heart rhythm problems (arrythmias) or sudden death.  SCAD most commonly affects women in their 40s and 50s, though it can occur at any age and can occur in men. SCAD can be caused by genetic and non-genetic factors.  However, people who have SCAD often don't have risk factors for heart disease, such as high blood pressure, high cholesterol or diabetes.    Genetic causes for SCAD can include underlying connective tissue disorder (CTD).  One specific CTD related to increased risk fo SCAD is vascular Dorian Danlos Syndrome (vEDS), which is caused by a mutation in the COL3A1 gene.  Other COL genes have recently been reported in conjunction with SCAD (COL3A1, COL5A1, COL4A1, COL6A1, COL5A2  [id number], COL4A5, COL1A2, [id number]) - Therefore, we discussed testing for the Connective Tissue Disorders Panel through [facility], which includes 7 of the related COL genes (those in bold are not included).       Inheritance:   Humans have over 20,000 genes that instruct our bodies how to function.  We have two copies of each gene because we inherit one from our mother and one from our father.  In most cardiac cases with a genetic component, the condition is inherited in an autosomal dominant (AD) pattern.  This means that in order to have the condition, a person needs to inherit a mutation on one copy of a particular gene.  This mutation or pathogenic variant dominates the \"normal\" working copy of the gene.  When an affected individual has children, they can either pass on the \"normal\" copy of the gene or the mutation.  Therefore, children have a 50% chance of inheriting the mutation.  Other family members also have an increased risk but the specific risk depends on the degree of relationship.  Additional inheritance patterns " can occur within families and may alter the risk of recurrence.     Testing Options:   Genetic testing is available to assess a panel of genes known to cause this condition.  This test reads through the DNA (sequencing) of these genes to look for spelling mistakes or mutations that could cause the condition.      There are three types of results you could receive from this test.     -Positive result (mutation/pathogenic variant identified) - confirms diagnosis and provides an answer to why this happened.  In addition, identifying a mutation allows family members to have testing to determine their risk.     -Negative result (mutation not identified) - no genetic changes were identified.  This does not rule out a genetic cause for the condition as the genetic testing only identifies a portion of genetic causes for this condition.    -Variant of uncertain significance (VUS) - a genetic change was identified, but there is not enough information to determine whether it is disease-causing or normal human genetic variation.     Detection rate for vascular EDS is about 98% with the COL3A1 gene. Genetic testing for hypermobile EDS is much lower (10-20%) and detection for classic EDS is somewhere in between.  Other connective tissue disorder have varying degrees of detection rates.  The detection rate for SCAD is not well known at this time.     Although genetic testing may identify a mutation, it cannot provide information about the severity of symptoms or the progression of disease.  We cannot predict age of onset or severity of symptoms due to reduced penetrance and variable expressivity.    Logistics:   Genetic testing involves collecting a sample of DNA, thru blood, saliva, or cheek cells. The sample will be sent to a laboratory to extract the DNA and sequence the genes for mutations.  The laboratory will work with your insurance company to determine the out of pocket (OOP) cost and will notify you if the OOP cost is  greater than $100.  Remember to ask the lab about financial assistance pricing and self pay options as well.  Sometimes those are much lower than insurance pricing.  When testing is initiated, results take about 2-4 weeks to return. I will contact you over the phone when results are available.     Genetic Information and Nondiscrimination Act:  The Genetic Information and Nondiscrimination Act of 2008 (WHITNEY) is a federal law that protects individuals from genetic discrimination in health insurance and employment. Genetic discrimination is defined as the misuse of genetic information. This law does not address potential discrimination regarding life insurance or disability insurance.      This is especially relevant for at risk individuals who are considering presymptomatic testing.    Screening Recommendations:  Recommend that first degree relatives, including parents,siblings and children, be screened for aortic aneurysms. If there is an underlying genetic syndrome, clinical evaluation with a medical geneticist may also be recommended.    Resources:  VADIM Cardiol. 2022;7(4):396-406. doi:10.1001/jamacardio.2022.0001.     General   American Heart Association - americanheart.org  Genetics Home Reference - ghr.nlm.nih.gov  Genetic Information and Nondiscrimination Act - ginahelp.org    Contact Information:  Julianne Rodriguez MS  Licensed Genetic Counselor  [facility]  [facility]    Office:  [phone]  Appointments:  [phone]  Fax: [phone]  Email: [email or web address]

## 2024-01-15 ENCOUNTER — MYC MEDICAL ADVICE (OUTPATIENT)
Dept: CARDIOLOGY | Facility: CLINIC | Age: 59
End: 2024-01-15
Payer: COMMERCIAL

## 2024-01-15 DIAGNOSIS — I25.42 CORONARY ARTERY DISSECTION: ICD-10-CM

## 2024-01-15 DIAGNOSIS — I21.4 NSTEMI (NON-ST ELEVATED MYOCARDIAL INFARCTION) (H): ICD-10-CM

## 2024-01-15 RX ORDER — ASPIRIN 81 MG/1
81 TABLET ORAL DAILY
Qty: 90 TABLET | Refills: 3 | Status: SHIPPED | OUTPATIENT
Start: 2024-01-15 | End: 2024-07-26

## 2024-01-22 ENCOUNTER — TELEPHONE (OUTPATIENT)
Dept: CARDIOLOGY | Facility: CLINIC | Age: 59
End: 2024-01-22
Payer: COMMERCIAL

## 2024-01-22 NOTE — TELEPHONE ENCOUNTER
Per mychart message, patient would like to know if she can stop taking aspirin as she is concerned that long term use will cause stomach issues.   Per last OV note:      SCAD: differential includes collagen vascular disorder and/or fibromuscular dysplasia, discussed CT scan results are not very sensitive and can only detect when FMD has progressed to the point of becoming visual to the naked eye by CT scan however genetic changes and alterations of blood flow can be occurring within the blood vessel wall itself (webs). Stress alone does not induce events, and usually a genetic predisposition is present. Would recommend genetic testing for our special SCAD panel unique to our clinic, as well as TAAD testing. We discussed overlap diagnoses that cause both SCAD and aortic enlargement. Will continue aspirin for now. Not on beta blocker given lack of htn. No need for statin per pt preference          Routing to provider to advise.  Deborah Pryor RN on 1/22/2024 at 7:59 AM

## 2024-01-24 ENCOUNTER — TELEPHONE (OUTPATIENT)
Dept: CARDIOLOGY | Facility: CLINIC | Age: 59
End: 2024-01-24
Payer: COMMERCIAL

## 2024-01-24 NOTE — TELEPHONE ENCOUNTER
Spoke with Palak today to review results of genetic testing. She underwent genetic testing for the hereditary disease of connective tissue panel. DNA was collected via cheek swab on December 30, 2023 and sent to [facility] laboratory.   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  A variant of unknown significance (VUS) means that there is a genetic change in which we do not have enough information to determine if it is disease causing or not. Labs review published data, functional studies, presences in population databases, similarity to normal sequence, and computer prediction models.    The COL9A3 gene is known to be associated with multiple epiphyseal dysplasia (MED), which is associated with joint pain in childhood.  Although it is a collagen gene it is not known to be associated with SCAD. This particular variant has not been reported before. Computer models predict this variant will impact protein function. Based on the current information it is unclear if this VUS is associated with disease or not.    Risk to relatives could still be as high as 50% but genetic testing is not predictive or recommended because we cannot interpret the results and make predictions.    Recommendation for clinical cardiac screening in all first degree relatives (parents, siblings, children) should be discussed with Dr. Tee.  If other family members are found to have connective tissue disorder, genetic testing through [facility] could be performed to help better understand this variant.  A summary letter and copy of the results will be sent to patient. All questions answered at this time.  Julianne Rodriguez MS, CGC  Licensed, Certified Genetic Counselor  [facility]  [facility]

## 2024-01-30 NOTE — TELEPHONE ENCOUNTER
Received response from Dr. Tee:     ----- Message -----   From: Isabel Tee MD   Sent: 1/30/2024  12:12 PM CST   To: Deborah Pryor RN     Hey there,     Aspirin can be very protective if SCAD should ever recur. While we recommend it for her condition there are always risks and benefits to sort through and ultimately patients can decide to stop a medication due to side effects. Alternatively high dose aspirin can be taken on an as-needed basis should she have a chest pain episode. Hope this helps!     Dr. Tee     Sent pt [employer] message with recommendations.

## 2024-01-31 ENCOUNTER — OFFICE VISIT (OUTPATIENT)
Dept: PEDIATRICS | Facility: CLINIC | Age: 59
End: 2024-01-31
Payer: COMMERCIAL

## 2024-01-31 VITALS
BODY MASS INDEX: 33.36 KG/M2 | HEART RATE: 80 BPM | OXYGEN SATURATION: 99 % | SYSTOLIC BLOOD PRESSURE: 124 MMHG | RESPIRATION RATE: 16 BRPM | TEMPERATURE: 97.9 F | WEIGHT: 188.3 LBS | DIASTOLIC BLOOD PRESSURE: 80 MMHG

## 2024-01-31 DIAGNOSIS — H10.33 ACUTE BACTERIAL CONJUNCTIVITIS OF BOTH EYES: Primary | ICD-10-CM

## 2024-01-31 DIAGNOSIS — J06.9 ACUTE URI: ICD-10-CM

## 2024-01-31 LAB
DEPRECATED S PYO AG THROAT QL EIA: NEGATIVE
GROUP A STREP BY PCR: NOT DETECTED

## 2024-01-31 PROCEDURE — 99213 OFFICE O/P EST LOW 20 MIN: CPT | Performed by: NURSE PRACTITIONER

## 2024-01-31 PROCEDURE — 87651 STREP A DNA AMP PROBE: CPT | Performed by: NURSE PRACTITIONER

## 2024-01-31 RX ORDER — POLYMYXIN B SULFATE AND TRIMETHOPRIM 1; 10000 MG/ML; [USP'U]/ML
1 SOLUTION OPHTHALMIC EVERY 4 HOURS
Qty: 10 ML | Refills: 0 | Status: SHIPPED | OUTPATIENT
Start: 2024-01-31 | End: 2024-02-07

## 2024-01-31 RX ORDER — AMOXICILLIN 500 MG/1
500 CAPSULE ORAL 2 TIMES DAILY
Qty: 20 CAPSULE | Refills: 0 | Status: SHIPPED | OUTPATIENT
Start: 2024-01-31 | End: 2024-07-26

## 2024-01-31 ASSESSMENT — PAIN SCALES - GENERAL: PAINLEVEL: NO PAIN (0)

## 2024-01-31 ASSESSMENT — ENCOUNTER SYMPTOMS: EYE PAIN: 1

## 2024-01-31 NOTE — PATIENT INSTRUCTIONS
It was nice seeing you today.    Please let me know if you have any questions regarding today's visit!    Take care,    MELANI Larsen DNP  Family Medicine

## 2024-01-31 NOTE — PROGRESS NOTES
Assessment & Plan     Acute bacterial conjunctivitis of both eyes  Started on Polytrim today.  Follow-up as needed.  - polymixin b-trimethoprim (POLYTRIM) 31808-4.1 UNIT/ML-% ophthalmic solution; Place 1 drop into both eyes every 4 hours for 7 days    Acute URI  Can started amoxicillin if symptoms progress.  Likely viral.  - amoxicillin (AMOXIL) 500 MG capsule; Take 1 capsule (500 mg) by mouth 2 times daily  - Streptococcus A Rapid Screen w/Reflex to PCR - Clinic Collect  - Group A Streptococcus PCR Throat Swab      Subjective   Palak is a 58 year old, presenting for the following health issues:    Eye Problem        1/31/2024    10:38 AM   Additional Questions   Roomed by Pat Alvarez   Accompanied by KIKO     History of Present Illness       Reason for visit:  Pink eye  Symptom onset:  Today  Symptoms include:  Watery red eyes  Symptom intensity:  Mild  Symptom progression:  Staying the same  Had these symptoms before:  No  What makes it worse:  No  What makes it better:  No    She eats 2-3 servings of fruits and vegetables daily.She consumes 1 sweetened beverage(s) daily.She exercises with enough effort to increase her heart rate 20 to 29 minutes per day.  She exercises with enough effort to increase her heart rate 4 days per week.   She is taking medications regularly.     Symptoms started yesterday with a hoarse voice.  Woke up this morning with bilateral red eyes.  Discharge present  Minor sore throat.  No ear pain.  Feels nasal congestion.  Cough is present with green phlegm.  Acetaminophen for symptoms.    Review of Systems  Constitutional, HEENT, cardiovascular, pulmonary, gi and gu systems are negative, except as otherwise noted.      Objective    /80 (BP Location: Right arm, Patient Position: Sitting, Cuff Size: Adult Large)   Pulse 80   Temp 97.9  F (36.6  C) (Tympanic)   Resp 16   Wt 85.4 kg (188 lb 4.8 oz)   LMP  (LMP Unknown)   SpO2 99%   BMI 33.36 kg/m    Body mass index is 33.36  kg/m .    Physical Exam   GENERAL: alert and no distress  EYES: conjunctiva/corneas- conjunctival injection right eye.  Yellow discharge bilaterally  HENT: normal cephalic/atraumatic, ear canals and TM's normal, nose and mouth without ulcers or lesions, oropharynx clear, and oral mucous membranes moist  NECK: no adenopathy, no asymmetry, masses, or scars  RESP: lungs clear to auscultation - no rales, rhonchi or wheezes  CV: regular rate and rhythm, normal S1 S2, no S3 or S4, no murmur, click or rub, no peripheral edema  PSYCH: mentation appears normal, affect normal/bright          Signed Electronically by: Zohreh Larsen NP

## 2024-03-26 NOTE — LETTER
2/28/2023    Ml Paz, APRN CNP  [address] [place] [address]    RE: Palak VENECIA Eckert       Dear Colleague,     I had the pleasure of seeing Palak Eckert in the [facility].  HPI and Plan:   REASON FOR VISIT: Follow-up for recent NSTEMI due to SCAD    HISTORY OF PRESENT ILLNESS: I had the pleasure of seeing Palak Eckert at the [facility] cardiovascular clinic in [place] this morning.  She is a very pleasant 57-year-old female with history of hyperlipidemia and obesity was recently hospitalized with non-ST elevation MI.  She presented to [facility] on January  with complaints of chest discomfort.  Her ECG showed nonspecific ST-T changes and her troponin was elevated therefore she proceeded to have coronary angiography.  I personally reviewed the coronary angiography.  It revealed occluded circumflex from the ostium with faint left to left collaterals distally.  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  The left main, LAD and RCA were free of significant disease.  Given concern for possible retrograde progression of the ostial circumflex dissection to the left main, the patient was transferred to [facility] where she was monitored for several more days.  She was eventually discharged on dual antiplatelet therapy, statin and beta-blocker.    Since our last visit in late January the patient has remained stable from cardiac point of view.  She had couple episodes of diffuse flushing from head to toe as well as scalp tenderness.  The episodes lasted seconds.  She also had occasional lightheaded spells.  She has been reading about scad and is wondering whether she should be on some of the current medications.  No chest pain, palpitations, presyncope or syncope.    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  She remains quite stable from cardiac point of view.  She recently had CTA of the head, chest, abdomen and pelvis.  I reviewed the CT findings with her.  No evidence of FMD in her carotid arteries or renal arteries.  Also no evidence of intracranial aneurysms.  The CT noted incidentally a lesion in her L3 which could be hemangioma.  The recent event monitor was also reviewed and it showed no evidence of significant arrhythmias.    The patient's flushed episodes could be secondary to Imdur.  Therefore we will discontinue Imdur.  We discussed therapeutic management of scad including dual antiplatelet therapy.  I told the patient there is no consensus on whether patients like her should be on antiplatelet therapy.  Some advocate short-term aspirin and Plavix although the benefit of this is not clear.  We will stop the Plavix and continue aspirin for now.  She will continue metoprolol as she has history of hypertension per her report but this medication can also be discontinued in the near future.    I encouraged her to participate in scad registry at the [facility].  We will see in approximately 6 months for follow-up but sooner if she develops symptoms      John Amor MD    Today's clinic visit entailed:  30 minutes spent on the date of the encounter doing chart review, history and exam, documentation and further activities per the note  Provider  Link to [facility] Help Grid     Orders Placed This Encounter   Procedures     Adult Neurology  Referral     Follow-Up with Cardiology KAMI       No orders of the defined types were placed in this encounter.      Medications Discontinued During This Encounter   Medication Reason     isosorbide mononitrate (IMDUR) 30 MG 24 hr tablet      clopidogrel (PLAVIX) 75 MG tablet          Encounter Diagnoses   Name Primary?     Hemangioma, unspecified site      Spontaneous dissection of coronary artery Yes       CURRENT MEDICATIONS:  Current Outpatient Medications    Medication Sig Dispense Refill     aspirin (ASA) 81 MG EC tablet Take 1 tablet (81 mg) by mouth daily 90 tablet 3     metoprolol tartrate (LOPRESSOR) 25 MG tablet Take 1 tablet (25 mg) by mouth 2 times daily 90 tablet 3     acetaminophen (TYLENOL) 325 MG tablet Take 2 tablets (650 mg) by mouth every 4 hours as needed for mild pain or headaches (Patient not taking: Reported on 2/28/2023) 90 tablet 3     multivitamin w/minerals (THERA-VIT-M) tablet Take 1 tablet by mouth daily (Patient not taking: Reported on 2/28/2023)       nitroGLYcerin (NITROSTAT) 0.4 MG sublingual tablet For chest pain place 1 tablet under the tongue every 5 minutes for 3 doses. If symptoms persist 5 minutes after 1st dose call 911. (Patient not taking: Reported on 2/28/2023) 20 tablet 0       ALLERGIES     Allergies   Allergen Reactions     No Known Drug Allergies        PAST MEDICAL HISTORY:  Past Medical History:   Diagnosis Date     Coronary artery dissection 01/03/2023    Left circumflex     Unspecified sinusitis (chronic)        PAST SURGICAL HISTORY:  Past Surgical History:   Procedure Laterality Date     COLONOSCOPY  8/11/2015    Dr. No [facility]     COLONOSCOPY N/A 8/11/2015    Procedure: COLONOSCOPY;  Surgeon: Jake No MD;  Location:  GI     CV CORONARY ANGIOGRAM N/A 1/3/2023    Procedure: Coronary Angiogram;  Surgeon: Chong Schmitz MD;  Location:  HEART CARDIAC CATH LAB     CV LEFT HEART CATH N/A 1/3/2023    Procedure: Left Heart Catheterization;  Surgeon: Chong Schmitz MD;  Location:  HEART CARDIAC CATH LAB     [facility] NONSPECIFIC PROCEDURE  2001    sinus op       FAMILY HISTORY:  Family History   Problem Relation Age of Onset     Cancer Father         kidney     Respiratory Father         emphysema     Diabetes Father      Colon Cancer No family hx of        SOCIAL HISTORY:  Social History     Socioeconomic History     Marital status:      Spouse name: Bill     Number of children: 0     Years of  "education: 16     Highest education level: None   Occupational History     Occupation: teacher   Tobacco Use     Smoking status: Never     Smokeless tobacco: Never   Substance and Sexual Activity     Alcohol use: No     Comment: maybe 1 time per year     Drug use: No     Sexual activity: Yes     Partners: Male     Social Determinants of Health     Financial Resource Strain: Low Risk      Difficulty of Paying Living Expenses: Not very hard   Food Insecurity: No Food Insecurity     Worried About Running Out of Food in the Last Year: Never true     Ran Out of Food in the Last Year: Never true   Transportation Needs: No Transportation Needs     Lack of Transportation (Medical): No     Lack of Transportation (Non-Medical): No       Review of Systems:  Skin:  not assessed       Eyes:  not assessed      ENT:  not assessed      Respiratory:  Negative       Cardiovascular:    lightheadedness;Positive for    Gastroenterology: not assessed      Genitourinary:  not assessed      Musculoskeletal:  not assessed      Neurologic:  not assessed      Psychiatric:  not assessed      Heme/Lymph/Imm:  not assessed      Endocrine:  not assessed        Physical Exam:  Vitals: /80 (BP Location: Right arm, Patient Position: Sitting, Cuff Size: Adult Large)   Pulse 77   Ht 1.6 m (5' 3\")   Wt 98.7 kg (217 lb 9.6 oz)   LMP  (LMP Unknown)   SpO2 94%   BMI 38.55 kg/m      Constitutional:  cooperative;in no acute distress        Skin:  warm and dry to the touch          Head:  normocephalic        Eyes:  conjunctivae and lids unremarkable        Lymph:      ENT:  no pallor or cyanosis        Neck:  JVP normal        Respiratory:  clear to auscultation         Cardiac: regular rhythm;no murmurs, gallops or rubs detected                pulses full and equal, no bruits auscultated                                        GI:  abdomen soft;non-tender        Extremities and Muscular Skeletal:  no edema              Neurological:  no gross " motor deficits        Psych:  Alert and Oriented x 3        CC  No referring provider defined for this encounter.    Thank you for allowing me to participate in the care of your patient.      Sincerely,     John Amor MD, MD     [facility]   R knee decreased strength secondary to post surgical pain and inflammation./grossly assessed due to

## 2024-05-21 ENCOUNTER — PATIENT OUTREACH (OUTPATIENT)
Dept: CARE COORDINATION | Facility: CLINIC | Age: 59
End: 2024-05-21
Payer: COMMERCIAL

## 2024-06-06 ENCOUNTER — PATIENT OUTREACH (OUTPATIENT)
Dept: CARE COORDINATION | Facility: CLINIC | Age: 59
End: 2024-06-06
Payer: COMMERCIAL

## 2024-06-18 ENCOUNTER — PATIENT OUTREACH (OUTPATIENT)
Dept: CARE COORDINATION | Facility: CLINIC | Age: 59
End: 2024-06-18
Payer: COMMERCIAL

## 2024-06-20 ENCOUNTER — PATIENT OUTREACH (OUTPATIENT)
Dept: CARE COORDINATION | Facility: CLINIC | Age: 59
End: 2024-06-20
Payer: COMMERCIAL

## 2024-06-24 ENCOUNTER — ANCILLARY PROCEDURE (OUTPATIENT)
Dept: MAMMOGRAPHY | Facility: CLINIC | Age: 59
End: 2024-06-24
Attending: STUDENT IN AN ORGANIZED HEALTH CARE EDUCATION/TRAINING PROGRAM
Payer: COMMERCIAL

## 2024-06-24 DIAGNOSIS — Z12.31 VISIT FOR SCREENING MAMMOGRAM: ICD-10-CM

## 2024-06-24 PROCEDURE — 77067 SCR MAMMO BI INCL CAD: CPT | Mod: TC | Performed by: RADIOLOGY

## 2024-06-24 PROCEDURE — 77063 BREAST TOMOSYNTHESIS BI: CPT | Mod: TC | Performed by: RADIOLOGY

## 2024-07-17 ENCOUNTER — TELEPHONE (OUTPATIENT)
Dept: CARDIOLOGY | Facility: CLINIC | Age: 59
End: 2024-07-17
Payer: COMMERCIAL

## 2024-07-17 DIAGNOSIS — I25.42 SPONTANEOUS DISSECTION OF CORONARY ARTERY: Primary | ICD-10-CM

## 2024-07-17 NOTE — TELEPHONE ENCOUNTER
Tried to call patient to discuss concerns below. No answer. Left VM to call team 4 back at [phone]

## 2024-07-17 NOTE — TELEPHONE ENCOUNTER
[facility] Call Center    Phone Message    May a detailed message be left on voicemail: yes     Reason for Call: Symptoms or Concerns     If patient has red-flag symptoms, warm transfer to triage line    Current symptom or concern: Chest discomfort, not consistent. When she makes certain movements. Started last month      Action Taken: Other: Cardiology    Travel Screening: Not Applicable    Thank you!  [facility]       Date of Service:

## 2024-07-17 NOTE — TELEPHONE ENCOUNTER
Spoke with patient and she stated that the chest discomfort happens daily, but comes and goes. It mainly happens when reaching for something, laying on her back or moves in certain positions. It is not caused by exertion. Denies SOB. Patient described the discomfort as a soreness or sometimes a twinge. It usually resolves quickly. It has been going on for about a month or less. It has not gotten worse but also has not improved. Pt is worried that it is related to her SCAD history. Patient has OV with Dr. Tee 9/17/24 but is wondering if Dr. Tee would want to do any testing prior.       Will route to Dr. Tee to review

## 2024-07-19 NOTE — TELEPHONE ENCOUNTER
RN called patient and left  requesting callback to review Dr. Tee's recommendations below.       We could do a coronary CT angiogram which shows any coronary changes related to SCAD, as well as an echocardiogram. If it's a SCAD event that is new, usually presents as a single one time very severe event and wouldn't necessarily change with position. If she had anything like that then the ER is the best bet for that evaluation. But I am ok ordering those other studies prior to a visit with me as an alternative to urgent evaluation.    Dr. Tee

## 2024-07-19 NOTE — TELEPHONE ENCOUNTER
Patient returned call and reviewed with her Dr. Tee's recommendations below. Patient verbalized understanding and is in agreement with plan. Orders for CT coronary angio as well as echo placed and message sent to scheduling to call patient to arrange. Patient will callback with any further questions/concerns prior to her follow up testing.

## 2024-07-20 ENCOUNTER — APPOINTMENT (OUTPATIENT)
Dept: GENERAL RADIOLOGY | Facility: CLINIC | Age: 59
End: 2024-07-20
Attending: EMERGENCY MEDICINE
Payer: COMMERCIAL

## 2024-07-20 ENCOUNTER — HOSPITAL ENCOUNTER (EMERGENCY)
Facility: CLINIC | Age: 59
Discharge: HOME | End: 2024-07-20
Attending: EMERGENCY MEDICINE | Admitting: EMERGENCY MEDICINE
Payer: COMMERCIAL

## 2024-07-20 VITALS
HEIGHT: 64 IN | WEIGHT: 184.08 LBS | DIASTOLIC BLOOD PRESSURE: 80 MMHG | HEART RATE: 64 BPM | OXYGEN SATURATION: 99 % | TEMPERATURE: 97.2 F | RESPIRATION RATE: 18 BRPM | SYSTOLIC BLOOD PRESSURE: 133 MMHG | BODY MASS INDEX: 31.43 KG/M2

## 2024-07-20 DIAGNOSIS — R07.9 CHEST PAIN, UNSPECIFIED TYPE: ICD-10-CM

## 2024-07-20 LAB
ANION GAP SERPL CALCULATED.3IONS-SCNC: 13 MMOL/L (ref 7–15)
ATRIAL RATE - MUSE: 69 BPM
BASOPHILS # BLD AUTO: 0.1 10E3/UL (ref 0–0.2)
BASOPHILS NFR BLD AUTO: 1 %
BUN SERPL-MCNC: 14.4 MG/DL (ref 8–23)
CALCIUM SERPL-MCNC: 9.2 MG/DL (ref 8.8–10.4)
CHLORIDE SERPL-SCNC: 103 MMOL/L (ref 98–107)
CREAT SERPL-MCNC: 0.59 MG/DL (ref 0.51–0.95)
DIASTOLIC BLOOD PRESSURE - MUSE: NORMAL MMHG
EGFRCR SERPLBLD CKD-EPI 2021: >90 ML/MIN/1.73M2
EOSINOPHIL # BLD AUTO: 0.2 10E3/UL (ref 0–0.7)
EOSINOPHIL NFR BLD AUTO: 2 %
ERYTHROCYTE [DISTWIDTH] IN BLOOD BY AUTOMATED COUNT: 13.8 % (ref 10–15)
GLUCOSE SERPL-MCNC: 93 MG/DL (ref 70–99)
HCO3 SERPL-SCNC: 23 MMOL/L (ref 22–29)
HCT VFR BLD AUTO: 41.8 % (ref 35–47)
HGB BLD-MCNC: 13.6 G/DL (ref 11.7–15.7)
IMM GRANULOCYTES # BLD: 0 10E3/UL
IMM GRANULOCYTES NFR BLD: 0 %
INTERPRETATION ECG - MUSE: NORMAL
LYMPHOCYTES # BLD AUTO: 1.7 10E3/UL (ref 0.8–5.3)
LYMPHOCYTES NFR BLD AUTO: 21 %
MCH RBC QN AUTO: 29.2 PG (ref 26.5–33)
MCHC RBC AUTO-ENTMCNC: 32.5 G/DL (ref 31.5–36.5)
MCV RBC AUTO: 90 FL (ref 78–100)
MONOCYTES # BLD AUTO: 0.8 10E3/UL (ref 0–1.3)
MONOCYTES NFR BLD AUTO: 10 %
NEUTROPHILS # BLD AUTO: 5.3 10E3/UL (ref 1.6–8.3)
NEUTROPHILS NFR BLD AUTO: 66 %
NRBC # BLD AUTO: 0 10E3/UL
NRBC BLD AUTO-RTO: 0 /100
P AXIS - MUSE: 10 DEGREES
PLAT MORPH BLD: NORMAL
PLATELET # BLD AUTO: 397 10E3/UL (ref 150–450)
POTASSIUM SERPL-SCNC: 4.1 MMOL/L (ref 3.4–5.3)
PR INTERVAL - MUSE: 150 MS
QRS DURATION - MUSE: 86 MS
QT - MUSE: 396 MS
QTC - MUSE: 424 MS
R AXIS - MUSE: -4 DEGREES
RBC # BLD AUTO: 4.66 10E6/UL (ref 3.8–5.2)
RBC MORPH BLD: NORMAL
SODIUM SERPL-SCNC: 139 MMOL/L (ref 135–145)
SYSTOLIC BLOOD PRESSURE - MUSE: NORMAL MMHG
T AXIS - MUSE: 40 DEGREES
TROPONIN T SERPL HS-MCNC: <6 NG/L
VENTRICULAR RATE- MUSE: 69 BPM
WBC # BLD AUTO: 8 10E3/UL (ref 4–11)

## 2024-07-20 PROCEDURE — 93005 ELECTROCARDIOGRAM TRACING: CPT

## 2024-07-20 PROCEDURE — 85025 COMPLETE CBC W/AUTO DIFF WBC: CPT | Performed by: EMERGENCY MEDICINE

## 2024-07-20 PROCEDURE — 36415 COLL VENOUS BLD VENIPUNCTURE: CPT | Performed by: EMERGENCY MEDICINE

## 2024-07-20 PROCEDURE — 84484 ASSAY OF TROPONIN QUANT: CPT | Performed by: EMERGENCY MEDICINE

## 2024-07-20 PROCEDURE — 82374 ASSAY BLOOD CARBON DIOXIDE: CPT | Performed by: EMERGENCY MEDICINE

## 2024-07-20 PROCEDURE — 99285 EMERGENCY DEPT VISIT HI MDM: CPT | Mod: 25

## 2024-07-20 PROCEDURE — 71046 X-RAY EXAM CHEST 2 VIEWS: CPT

## 2024-07-20 ASSESSMENT — COLUMBIA-SUICIDE SEVERITY RATING SCALE - C-SSRS
1. IN THE PAST MONTH, HAVE YOU WISHED YOU WERE DEAD OR WISHED YOU COULD GO TO SLEEP AND NOT WAKE UP?: NO
2. HAVE YOU ACTUALLY HAD ANY THOUGHTS OF KILLING YOURSELF IN THE PAST MONTH?: NO
6. HAVE YOU EVER DONE ANYTHING, STARTED TO DO ANYTHING, OR PREPARED TO DO ANYTHING TO END YOUR LIFE?: NO

## 2024-07-20 ASSESSMENT — ACTIVITIES OF DAILY LIVING (ADL)
ADLS_ACUITY_SCORE: 35
ADLS_ACUITY_SCORE: 35

## 2024-07-20 NOTE — ED PROVIDER NOTES
"  Emergency Department Note      History of Present Illness     Chief Complaint   Chest Pain      HPI     Palak Eckert is a 59 year old female with a history of coronary artery dissection 18 months ago that cause a heart attack who presents with chest discomfort. Last month the patient developed intermittent \"twinges\" in her mid chest that last a few seconds. She frequently gets this sensation when laying down to go to sleep. She contacted her cardiologist who suggested she schedule an ECHO and CT angiogram with the first available appointment in mid August. Last night around 0100 while getting back into bed she developed diaphoresis with a flushed sensation in her chest and arms. Symptoms again lasted for a few seconds so she decided to come into the ED. She mentions stopping all routine medications that were prescribed since her hospital discharge in 2023. She denies any nausea, vomiting, shortness of breath.     Independent Historian   None    Review of External Notes   I reviewed discharge summary from 1/7/2023 in which patient was admitted for non-ST elevation MI.  Coronary angiogram was performed revealing spontaneous coronary dissection of the left circumflex.  It was felt that PCI of this lesion would be high risk and cardiology recommended conservative management.    Past Medical History     Medical History and Problem List   Coronary artery dissection   NSTEMI  Obesity     Medications   She denies any routine medications     Surgical History   Coronary angiogram   Left heart cath   Sinus surgery     Physical Exam     Patient Vitals for the past 24 hrs:   BP Temp Temp src Pulse Resp SpO2 Height Weight   07/20/24 0803 (!) 162/93 97.2  F (36.2  C) Temporal 75 18 99 % 1.626 m (5' 4\") 83.5 kg (184 lb 1.4 oz)     Physical Exam      HEENT:    Oropharynx is moist  Eyes:    Conjunctiva normal  Neck:     Supple, no meningismus.     CV:     Regular rate and rhythm.      No murmurs, rubs or gallops.       No " unilateral leg swelling.       2+ radial pulses bilateral.       No lower extremity edema.  PULM:    Clear to auscultation bilateral.       No respiratory distress.      Good air exchange.     No rales or wheezing.     No stridor.  ABD:    Soft, non-tender, non-distended.       No pulsatile masses.       No rebound, guarding or rigidity.  MSK:     No gross deformity to all four extremities.   LYMPH:   No cervical lymphadenopathy.  NEURO:   Alert. Good muscle tone, no atrophy.  Skin:    Warm, dry and intact.    Psych:    Mood is good and affect is appropriate.      Diagnostics     Lab Results   Labs Ordered and Resulted from Time of ED Arrival to Time of ED Departure   BASIC METABOLIC PANEL - Normal       Result Value    Sodium 139      Potassium 4.1      Chloride 103      Carbon Dioxide (CO2) 23      Anion Gap 13      Urea Nitrogen 14.4      Creatinine 0.59      GFR Estimate >90      Calcium 9.2      Glucose 93     TROPONIN T, HIGH SENSITIVITY - Normal    Troponin T, High Sensitivity <6     CBC WITH PLATELETS AND DIFFERENTIAL    WBC Count 8.0      RBC Count 4.66      Hemoglobin 13.6      Hematocrit 41.8      MCV 90      MCH 29.2      MCHC 32.5      RDW 13.8      Platelet Count 397      NRBCs per 100 WBC 0      Absolute NRBCs 0.0     RBC AND PLATELET MORPHOLOGY       Imaging   Chest XR,  PA & LAT   Final Result   IMPRESSION: Negative chest.        EKG   ECG results from 07/20/24   EKG 12-lead, tracing only     Value    Systolic Blood Pressure     Diastolic Blood Pressure     Ventricular Rate 69    Atrial Rate 69    NH Interval 150    QRS Duration 86        QTc 424    P Axis 10    R AXIS -4    T Axis 40    Interpretation ECG      Sinus rhythm  Normal ECG  When compared with ECG of 05-JAN-2023 00:08,  No significant change was found  Confirmed by - EMERGENCY ROOM, PHYSICIAN ([id number]),  CINTIA MCCULLOUGH ([id number]) on 7/20/2024 10:05:29 AM       Independent Interpretation   CXR: No pneumothorax or infiltrate.    ED  Course      Medications Administered   Medications - No data to display    Procedures   Procedures     Discussion of Management   None    ED Course   ED Course as of 07/20/24 1023   Sat Jul 20, 2024   0843 I initially assessed the patient and obtained the above history and physical exam.     Optional/Additional Documentation  None    Medical Decision Making / Diagnosis     CMS Diagnoses: None    MIPS       None    MDM     Palak Eckert is a 59 year old female with a history of spontaneous coronary artery dissection in 2023 presents with atypical momentary chest discomfort while lying flat occurring at 1 AM today.  She is now asymptomatic.  EKG without dysrhythmia or ischemic changes.  Troponin is undetectable.  Based on timing of symptoms, no indication for serial enzymes.  She has no active symptoms to suggest aortic dissection, aortic aneurysm, pulmonary embolism.  Patient has atypical symptoms ever since her coronary artery dissection.  This may simply represent recurrence of those symptoms but has no sinister pathology noted today.  Differential diagnosis would also include costochondritis, pleurisy, atypical reflux or esophageal spasm.  Patient safe for discharge home, close follow-up with PCP or cardiology and return to ED for worsening symptoms.    Disposition   The patient was discharged.     Diagnosis     ICD-10-CM    1. Chest pain, unspecified type  R07.9            Discharge Medications   New Prescriptions    No medications on file     Scribe Disclosure:  I, Juarez Santo, am serving as a scribe at 8:53 AM on 7/20/2024 to document services personally performed by Stanley Villagran MD based on my observations and the provider's statements to me.        Stanley Villagran MD  07/20/24 0295

## 2024-07-20 NOTE — ED TRIAGE NOTES
"Pt states that 18 months ago she had a coronary artery dissection that caused a heart attack.  She states that since June she has been having \"twinges\" of pain.  She states that it is not constant and not pain just twinges in his mid chest.  She states that she contacted cards who suggested she schedule an ECHO and a CT angio.  She states the first appointment available was in mid August.  Last night she states she got up to use the bathroom and when she laid back in the bed she had a flushing across her chest and arms.  She comes in for assessment of the \"twinges\" and the flushing.      Triage Assessment (Adult)       Row Name 07/20/24 0805          Triage Assessment    Airway WDL WDL        Respiratory WDL    Respiratory WDL WDL        Cardiac WDL    Cardiac WDL X;chest pain        Chest Pain Assessment    Chest Pain Location epigastric     Character spasm                     "

## 2024-07-22 ENCOUNTER — PATIENT OUTREACH (OUTPATIENT)
Dept: PEDIATRICS | Facility: CLINIC | Age: 59
End: 2024-07-22
Payer: COMMERCIAL

## 2024-07-22 NOTE — TELEPHONE ENCOUNTER
Patient was seen at the [facility] on 7/20/2024 for Chest Pain.     Follow-Ups: Schedule an appointment with Milligan, Deirdre E, MD (Internal Medicine - Pediatrics) in 3 days (7/23/2024)     Routing to the [facility] Nurse Pool for an RN to contact to the patient to complete the TCM Outreach.     Precious BARRERA RN   [facility]

## 2024-07-23 NOTE — TELEPHONE ENCOUNTER
ED / Discharge Outreach Protocol    Patient Contact    Attempt # 1    Was call answered?  No.  Left message on voicemail with information to call back and speak with any triage nurse.     Jonnie HORTA RN 7/23/2024 at 4:33 PM

## 2024-07-24 NOTE — TELEPHONE ENCOUNTER
Transitions of Care Outreach  Chief Complaint   Patient presents with    Hospital F/U     TCM Outreach        Most Recent Admission Date: 7/20/2024   Most Recent Admission Diagnosis:      Most Recent Discharge Date: 7/20/2024   Most Recent Discharge Diagnosis: Chest pain, unspecified type - R07.9     Transitions of Care Assessment    Discharge Assessment  How are you doing now that you are home?: unchanged. has upcoming cardiology visit in September and testing in august.  How are your symptoms? (Red Flag symptoms escalate to triage hotline per guidelines): Unchanged  Do you know how to contact your clinic care team if you have future questions or changes to your health status? : Yes  Does the patient have their discharge instructions? : Yes  Does the patient have questions regarding their discharge instructions? : No  Were you started on any new medications or were there changes to any of your previous medications? : No  Does the patient have all of their medications?: Yes  Do you have questions regarding any of your medications? : No  Do you have all of your needed medical supplies or equipment (DME)?  (i.e. oxygen tank, CPAP, cane, etc.): Yes    Follow up Plan     Discharge Follow-Up  Discharge follow up appointment scheduled in alignment with recommended follow up timeframe or Transitions of Risk Category? (Low = within 30 days; Moderate= within 14 days; High= within 7 days): Yes  Discharge Follow Up Appointment Date: 07/26/24  Discharge Follow Up Appointment Scheduled with?: Primary Care Provider    Future Appointments   Date Time Provider Department Center   7/26/2024  9:30 AM Milligan, Deirdre E, MD [facility] EA   8/14/2024 11:00 AM SCICT1 [facility]CVCT CVIMG   8/14/2024 12:45 PM [id number] SHCVCV CVIMG   9/17/2024  4:15 PM Isabel Tee MD [facility]       Outpatient Plan as outlined on AVS reviewed with patient.    For any urgent concerns, please contact our 24 hour nurse triage line: [phone]  Reviewed record in preparation for visit and have obtained necessary documentation. Identified pt with two pt identifiers(name and ).     Chief Complaint   Patient presents with    Hypertension     f/up     Anxiety     f/up        Vitals:    18 1130   BP: 149/76   Pulse: 65   Resp: 14   Temp: 98.2 °F (36.8 °C)   TempSrc: Oral   SpO2: 96%   Weight: 142 lb 6.4 oz (64.6 kg)   Height: 5' 7\" (1.702 m)   PainSc:   0 - No pain       Coordination of Care Questionnaire:  :     1) Have you been to an emergency room, urgent care clinic since your last visit? no   Hospitalized since your last visit? no             2) Have you seen or consulted any other health care providers outside of [address] [place] [address] since your last visit? no  (Include any pap smears or colon screenings in this section.)    Health Maintenance Due   Topic Date Due    ZOSTER VACCINE AGE 60>  10/17/2004    COLONOSCOPY  2018     Colonoscopy due this year, will discuss with the provider       Angeles August RN ([phone])       Aimee Samuel RN

## 2024-07-24 NOTE — TELEPHONE ENCOUNTER
ED / Discharge Outreach Protocol    Patient Contact    Attempt # 2    Was call answered?  No.  Left message on voicemail with information to call nurse back.  [phone]    Reyna MAY RN on 7/24/2024 at 9:10 AM

## 2024-07-26 ENCOUNTER — OFFICE VISIT (OUTPATIENT)
Dept: PEDIATRICS | Facility: CLINIC | Age: 59
End: 2024-07-26
Payer: COMMERCIAL

## 2024-07-26 VITALS
SYSTOLIC BLOOD PRESSURE: 135 MMHG | HEIGHT: 66 IN | WEIGHT: 182.6 LBS | RESPIRATION RATE: 18 BRPM | DIASTOLIC BLOOD PRESSURE: 82 MMHG | BODY MASS INDEX: 29.35 KG/M2 | HEART RATE: 70 BPM | OXYGEN SATURATION: 98 % | TEMPERATURE: 97.5 F

## 2024-07-26 DIAGNOSIS — R07.89 CHEST DISCOMFORT: Primary | ICD-10-CM

## 2024-07-26 PROBLEM — E66.01 CLASS 2 SEVERE OBESITY DUE TO EXCESS CALORIES WITH SERIOUS COMORBIDITY IN ADULT (H): Status: RESOLVED | Noted: 2023-10-04 | Resolved: 2024-07-26

## 2024-07-26 PROBLEM — E66.812 CLASS 2 SEVERE OBESITY DUE TO EXCESS CALORIES WITH SERIOUS COMORBIDITY IN ADULT (H): Status: RESOLVED | Noted: 2023-10-04 | Resolved: 2024-07-26

## 2024-07-26 PROCEDURE — 99213 OFFICE O/P EST LOW 20 MIN: CPT | Performed by: STUDENT IN AN ORGANIZED HEALTH CARE EDUCATION/TRAINING PROGRAM

## 2024-07-26 ASSESSMENT — PAIN SCALES - GENERAL: PAINLEVEL: NO PAIN (0)

## 2024-08-17 ENCOUNTER — HEALTH MAINTENANCE LETTER (OUTPATIENT)
Age: 59
End: 2024-08-17

## 2024-09-11 ENCOUNTER — HOSPITAL ENCOUNTER (OUTPATIENT)
Dept: CARDIOLOGY | Facility: CLINIC | Age: 59
Discharge: HOME | End: 2024-09-11
Attending: INTERNAL MEDICINE
Payer: COMMERCIAL

## 2024-09-11 VITALS — DIASTOLIC BLOOD PRESSURE: 76 MMHG | SYSTOLIC BLOOD PRESSURE: 123 MMHG | HEART RATE: 63 BPM

## 2024-09-11 DIAGNOSIS — I25.42 SPONTANEOUS DISSECTION OF CORONARY ARTERY: ICD-10-CM

## 2024-09-11 LAB — LVEF ECHO: NORMAL

## 2024-09-11 PROCEDURE — 250N000011 HC RX IP 250 OP 636: Performed by: INTERNAL MEDICINE

## 2024-09-11 PROCEDURE — 75574 CT ANGIO HRT W/3D IMAGE: CPT | Mod: 26 | Performed by: INTERNAL MEDICINE

## 2024-09-11 PROCEDURE — 93306 TTE W/DOPPLER COMPLETE: CPT | Mod: 26 | Performed by: INTERNAL MEDICINE

## 2024-09-11 PROCEDURE — 93306 TTE W/DOPPLER COMPLETE: CPT

## 2024-09-11 PROCEDURE — 250N000013 HC RX MED GY IP 250 OP 250 PS 637: Performed by: INTERNAL MEDICINE

## 2024-09-11 PROCEDURE — 250N000009 HC RX 250: Performed by: INTERNAL MEDICINE

## 2024-09-11 PROCEDURE — 75574 CT ANGIO HRT W/3D IMAGE: CPT

## 2024-09-11 RX ORDER — DIPHENHYDRAMINE HYDROCHLORIDE 50 MG/ML
25-50 INJECTION INTRAMUSCULAR; INTRAVENOUS
Status: DISCONTINUED | OUTPATIENT
Start: 2024-09-11 | End: 2024-09-12 | Stop reason: HOSPADM

## 2024-09-11 RX ORDER — IOPAMIDOL 755 MG/ML
500 INJECTION, SOLUTION INTRAVASCULAR ONCE
Status: COMPLETED | OUTPATIENT
Start: 2024-09-11 | End: 2024-09-11

## 2024-09-11 RX ORDER — LIDOCAINE 40 MG/G
CREAM TOPICAL
OUTPATIENT
Start: 2024-09-11

## 2024-09-11 RX ORDER — IVABRADINE 5 MG/1
5-15 TABLET, FILM COATED ORAL
Status: COMPLETED | OUTPATIENT
Start: 2024-09-11 | End: 2024-09-11

## 2024-09-11 RX ORDER — DILTIAZEM HYDROCHLORIDE 5 MG/ML
10-15 INJECTION INTRAVENOUS
Status: DISCONTINUED | OUTPATIENT
Start: 2024-09-11 | End: 2024-09-12 | Stop reason: HOSPADM

## 2024-09-11 RX ORDER — METOPROLOL TARTRATE 25 MG/1
25-100 TABLET, FILM COATED ORAL
Status: COMPLETED | OUTPATIENT
Start: 2024-09-11 | End: 2024-09-11

## 2024-09-11 RX ORDER — NITROGLYCERIN 0.4 MG/1
0.4 TABLET SUBLINGUAL
Status: DISCONTINUED | OUTPATIENT
Start: 2024-09-11 | End: 2024-09-12 | Stop reason: HOSPADM

## 2024-09-11 RX ORDER — METHYLPREDNISOLONE SODIUM SUCCINATE 125 MG/2ML
125 INJECTION, POWDER, LYOPHILIZED, FOR SOLUTION INTRAMUSCULAR; INTRAVENOUS
Status: DISCONTINUED | OUTPATIENT
Start: 2024-09-11 | End: 2024-09-12 | Stop reason: HOSPADM

## 2024-09-11 RX ORDER — DIPHENHYDRAMINE HCL 25 MG
25 CAPSULE ORAL
Status: DISCONTINUED | OUTPATIENT
Start: 2024-09-11 | End: 2024-09-12 | Stop reason: HOSPADM

## 2024-09-11 RX ORDER — ONDANSETRON 2 MG/ML
4 INJECTION INTRAMUSCULAR; INTRAVENOUS
Status: DISCONTINUED | OUTPATIENT
Start: 2024-09-11 | End: 2024-09-12 | Stop reason: HOSPADM

## 2024-09-11 RX ORDER — DILTIAZEM HCL 60 MG
120 TABLET ORAL
Status: DISCONTINUED | OUTPATIENT
Start: 2024-09-11 | End: 2024-09-12 | Stop reason: HOSPADM

## 2024-09-11 RX ORDER — METOPROLOL TARTRATE 1 MG/ML
5-15 INJECTION, SOLUTION INTRAVENOUS
Status: DISCONTINUED | OUTPATIENT
Start: 2024-09-11 | End: 2024-09-12 | Stop reason: HOSPADM

## 2024-09-11 RX ADMIN — METOPROLOL TARTRATE 10 MG: 5 INJECTION INTRAVENOUS at 10:21

## 2024-09-11 RX ADMIN — NITROGLYCERIN 0.4 MG: 0.4 TABLET SUBLINGUAL at 10:43

## 2024-09-11 RX ADMIN — IVABRADINE 10 MG: 5 TABLET, FILM COATED ORAL at 09:16

## 2024-09-11 RX ADMIN — METOPROLOL TARTRATE 50 MG: 50 TABLET, FILM COATED ORAL at 09:16

## 2024-09-11 RX ADMIN — IOPAMIDOL 110 ML: 755 INJECTION, SOLUTION INTRAVENOUS at 10:52

## 2024-10-14 ENCOUNTER — OFFICE VISIT (OUTPATIENT)
Dept: CARDIOLOGY | Facility: CLINIC | Age: 59
End: 2024-10-14
Payer: COMMERCIAL

## 2024-10-14 VITALS
DIASTOLIC BLOOD PRESSURE: 78 MMHG | OXYGEN SATURATION: 100 % | HEIGHT: 64 IN | BODY MASS INDEX: 30.73 KG/M2 | WEIGHT: 180 LBS | SYSTOLIC BLOOD PRESSURE: 146 MMHG | HEART RATE: 80 BPM

## 2024-10-14 DIAGNOSIS — I21.4 NSTEMI (NON-ST ELEVATED MYOCARDIAL INFARCTION) (H): Primary | ICD-10-CM

## 2024-10-14 PROCEDURE — G2211 COMPLEX E/M VISIT ADD ON: HCPCS | Performed by: INTERNAL MEDICINE

## 2024-10-14 PROCEDURE — 99214 OFFICE O/P EST MOD 30 MIN: CPT | Performed by: INTERNAL MEDICINE

## 2024-10-14 NOTE — Clinical Note
10/14/2024    Deirdre E. Milligan, MD  [address] [facility] [address]    RE: Palak Eckert       Dear Colleague,     I had the pleasure of seeing Palak Eckert in the [facility].  HPI:     She is a very pleasant 57-year-old female with no PMH here for follow up of SCAD event. SHe was referred by Dr. Amor.     She presented to [facility] on January with complaints of chest discomfort. Her ECG showed nonspecific ST-T changes and her troponin was elevated therefore she proceeded to have coronary angiography.      It revealed occluded circumflex from the ostium with faint left to left collaterals distally. 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 The left main, LAD and RCA were free of significant disease. Given concern for possible retrograde progression of the ostial circumflex dissection to the left main, the patient was transferred to [facility] where she was monitored for several more days. She was eventually discharged on dual antiplatelet therapy, statin and beta-blocker.      She since then has had no significant event. She denies chest pain, SOB.      Family history reviewed.   No h/o sudden death, aneurysms, stroke, MI. Parents are old age.  Twin daughters, healthy.      Had job stress leading to event. She is a .     No history of HTN. On metoprolol now off. Off plavix now. On aspirin.      Aorta 4.0 cm on surveillance imaging. No fam h/o aortic disease.     On ROS: no lens dislocation, normal eye-width, normal uvula, normal palate, no hypermobility in thumb joints, no skin translucency, normal skin, no vaginal prolapse or hernias, no abnormal wound healing, no easy bruising or bleeding, no chest wall deformities, no dental crowding, normal stature.     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  A variant of unknown  significance (VUS) means that there is a genetic change in which we do not have enough information to determine if it is disease causing or not. Labs review published data, functional studies, presences in population databases, similarity to normal sequence, and computer prediction models.    The COL9A3 gene is known to be associated with multiple epiphyseal dysplasia (MED), which is associated with joint pain in childhood.  Although it is a collagen gene it is not known to be associated with SCAD. This particular variant has not been reported before. Computer models predict this variant will impact protein function. Based on the current information it is unclear if this VUS is associated with disease or not.          ASSESSMENT/PLAN:     SCAD: differential includes collagen vascular disorder and/or fibromuscular dysplasia, discussed CT scan results are not very sensitive and can only detect when FMD has progressed to the point of becoming visual to the naked eye by CT scan however genetic changes and alterations of blood flow can be occurring within the blood vessel wall itself (webs). Stress alone does not induce events, and usually a genetic predisposition is present. Would recommend genetic testing for our special SCAD panel unique to our clinic, as well as TAAD testing. We discussed overlap diagnoses that cause both SCAD and aortic enlargement. Will continue aspirin for now. Not on beta blocker given lack of htn. No need for statin per pt preference.     2.  Aortic dilation (mild): will plan for annual surveillance, TAAD panel as above. Ascending aorta 3.9 cm.     3.  Possible FMD: will obtain renal artery ultrasound      Follow up yearly with me. Results to be discussed via mychart.    Isabel Tee MD MSC               PAST MEDICAL HISTORY  Past Medical History:   Diagnosis Date    Coronary artery dissection 01/03/2023    Left circumflex    Unspecified sinusitis (chronic)        CURRENT MEDICATIONS  No  current outpatient medications on file.       PAST SURGICAL HISTORY:  Past Surgical History:   Procedure Laterality Date    COLONOSCOPY  8/11/2015    Dr. No [facility]    COLONOSCOPY N/A 8/11/2015    Procedure: COLONOSCOPY;  Surgeon: Jake No MD;  Location:  GI    CV CORONARY ANGIOGRAM N/A 1/3/2023    Procedure: Coronary Angiogram;  Surgeon: Chong Schmitz MD;  Location:  HEART CARDIAC CATH LAB    CV LEFT HEART CATH N/A 1/3/2023    Procedure: Left Heart Catheterization;  Surgeon: Chong Schmitz MD;  Location:  HEART CARDIAC CATH LAB    ZZC NONSPECIFIC PROCEDURE  2001    sinus op       ALLERGIES     Allergies   Allergen Reactions    No Known Drug Allergy        FAMILY HISTORY  Family History   Problem Relation Age of Onset    Cancer Father         kidney    Respiratory Father         emphysema    Diabetes Father     Colon Cancer No family hx of        SOCIAL HISTORY  Social History     Socioeconomic History    Marital status:      Spouse name: Bill    Number of children: 0    Years of education: 16    Highest education level: Not on file   Occupational History    Occupation: teacher   Tobacco Use    Smoking status: Never     Passive exposure: Never    Smokeless tobacco: Never   Vaping Use    Vaping status: Never Used   Substance and Sexual Activity    Alcohol use: No     Comment: maybe 1 time per year    Drug use: No    Sexual activity: Yes     Partners: Male   Other Topics Concern    Parent/sibling w/ CABG, MI or angioplasty before 65F 55M? Not Asked   Social History Narrative    Not on file     Social Determinants of Health     Financial Resource Strain: Low Risk  (1/31/2024)    Financial Resource Strain     Within the past 12 months, have you or your family members you live with been unable to get utilities (heat, electricity) when it was really needed?: No   Food Insecurity: Low Risk  (1/31/2024)    Food Insecurity     Within the past 12 months, did you worry that your food would run  out before you got money to buy more?: No     Within the past 12 months, did the food you bought just not last and you didn t have money to get more?: No   Transportation Needs: Low Risk  (1/31/2024)    Transportation Needs     Within the past 12 months, has lack of transportation kept you from medical appointments, getting your medicines, non-medical meetings or appointments, work, or from getting things that you need?: No   Physical Activity: Inactive (1/17/2022)    Exercise Vital Sign     Days of Exercise per Week: 0 days     Minutes of Exercise per Session: 0 min   Stress: No Stress Concern Present (1/17/2022)    [place] [employer] - Occupational Stress Questionnaire     Feeling of Stress : Only a little   Social Connections: Moderately Isolated (1/17/2022)    Social Connection and Isolation Panel [NHANES]     Frequency of Communication with Friends and Family: Once a week     Frequency of Social Gatherings with Friends and Family: Never     Attends [employer] Services: More than 4 times per year     Active Member of Clubs or Organizations: No     Attends Club or Organization Meetings: Not on file     Marital Status:    Interpersonal Safety: Low Risk  (1/31/2024)    Interpersonal Safety     Do you feel physically and emotionally safe where you currently live?: Yes     Within the past 12 months, have you been hit, slapped, kicked or otherwise physically hurt by someone?: No     Within the past 12 months, have you been humiliated or emotionally abused in other ways by your partner or ex-partner?: No   Housing Stability: Low Risk  (1/31/2024)    Housing Stability     Do you have housing? : Yes     Are you worried about losing your housing?: No       ROS:   Constitutional: No fever, chills, or sweats. No weight gain/loss   ENT: No visual disturbance, ear ache, epistaxis, sore throat  Allergies/Immunologic: Negative  Respiratory: No cough, hemoptysia  Cardiovascular: As per HPI  GI: No  "nausea, vomiting, hematemesis, melena, or hematochezia  : No urinary frequency, dysuria, or hematuria  Integument: Negative  Psychiatric: Negative  Neuro: Negative  Endocrinology: Negative   Musculoskeletal: Negative  Vascular: No walking impairment, claudication, ischemic rest pain or nonhealing wounds    EXAM:  BP (!) 146/78 (BP Location: Right arm, Patient Position: Sitting)   Pulse 80   Ht 1.626 m (5' 4\")   Wt 81.6 kg (180 lb)   LMP  (LMP Unknown)   SpO2 100%   BMI 30.90 kg/m    In general, the patient is a pleasant female in no apparent distress.    HEENT: NC/AT.  PERRLA.  EOMI.  Sclerae white, not injected.  Nares clear.  Pharynx without erythema or exudate.  Dentition intact.    Neck: No adenopathy.  No thyromegaly. Carotids +2/2 bilaterally without bruits.  No jugular venous distension.   Heart: RRR. Normal S1, S2 splits physiologically. No murmur, rub, click, or gallop. The PMI is in the 5th ICS in the midclavicular line. There is no heave.    Lungs: CTA.  No ronchi, wheezes, rales.  No dullness to percussion.   Abdomen: Soft, nontender, nondistended. No organomegaly. No AAA.  No bruits.   Extremities: No clubbing, cyanosis, or edema.  No wounds. No varicose veins signs of chronic venous insufficiency.   Vascular: No bruits are noted.    Labs:  LIPID RESULTS:  Lab Results   Component Value Date    CHOL 159 01/03/2023    CHOL 186 07/22/2019    HDL 44 (L) 01/03/2023    HDL 45 (L) 07/22/2019    LDL 89 01/03/2023     (H) 07/22/2019    TRIG 132 01/03/2023    TRIG 179 (H) 07/22/2019    CHOLHDLRATIO 3.8 07/21/2015    NHDL 115 01/03/2023    NHDL 141 (H) 07/22/2019       LIVER ENZYME RESULTS:  Lab Results   Component Value Date    AST 47 (H) 01/05/2023    AST 25 11/21/2006    ALT 25 01/05/2023    ALT 24 11/21/2006       CBC RESULTS:  Lab Results   Component Value Date    WBC 8.0 07/20/2024    WBC 9.5 11/21/2006    RBC 4.66 07/20/2024    RBC 4.59 11/21/2006    HGB 13.6 07/20/2024    HGB 13.9 11/21/2006 "    HCT 41.8 07/20/2024    HCT 40.0 11/21/2006    MCV 90 07/20/2024    MCV 87 11/21/2006    MCH 29.2 07/20/2024    MCH 30.3 11/21/2006    MCHC 32.5 07/20/2024    MCHC 34.8 11/21/2006    RDW 13.8 07/20/2024    RDW 14.0 11/21/2006     07/20/2024     11/21/2006       BMP RESULTS:  Lab Results   Component Value Date     07/20/2024     11/21/2006    POTASSIUM 4.1 07/20/2024    POTASSIUM 4.0 01/07/2023    POTASSIUM 3.9 11/21/2006    CHLORIDE 103 07/20/2024    CHLORIDE 106 01/05/2023    CHLORIDE 102 11/21/2006    CO2 23 07/20/2024    CO2 26 01/05/2023    CO2 26 11/21/2006    ANIONGAP 13 07/20/2024    ANIONGAP 8 01/05/2023    ANIONGAP 12 11/21/2006    GLC 93 07/20/2024    GLC 99 01/05/2023    GLC 85 07/22/2019    BUN 14.4 07/20/2024    BUN 13 01/05/2023    BUN 12 11/21/2006    CR 0.59 07/20/2024    CR 0.80 11/21/2006    GFRESTIMATED >90 07/20/2024    GFRESTIMATED 84 11/21/2006    GFRESTBLACK  11/21/2006     >90  Stages of Chronic Kidney Disease  Stage 1:  GFR 90 or greater and other evidence of kidney damage*  Stage 2:  GFR 60-89 and other evidence of kidney damage *  Stage 3:  GFR 30-59  Stage 4:  GFR 15-29  Stage 5:  GFR less than 15 or dialysis  *Chronic kidney disease is defined as kidney damage or GFR less than 60   mL/min/1.73 m2 for three months or greater.  Kidney damage is defined as   pathologic abnormalities or markers or damage, including abnormalities in blood   or urine tests or imaging studies.    JARRET 9.2 07/20/2024    JARRET 9.1 11/21/2006        A1C RESULTS:  Lab Results   Component Value Date    A1C 5.8 (H) 01/03/2023       Procedures:      Assessment and Plan: ***      Thank you for allowing me to participate in the care of your patient.      Sincerely,     Isabel Tee MD     [facility]  cc:   Isabel Tee MD  [address] [place] [id number]

## 2024-10-14 NOTE — PROGRESS NOTES
Vascular Cardiology       HPI:     She is a very pleasant 59-year-old female with no PMH here for follow up of SCAD event. SHe was referred by Dr. Amor initially.      Initial visit: She presented to [facility] with complaints of chest discomfort. Her ECG showed nonspecific ST-T changes and her troponin was elevated therefore she proceeded to have coronary angiography. It revealed occluded circumflex from the ostium with faint left to left collaterals distally. 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 The left main, LAD and RCA were free of significant disease. Given concern for possible retrograde progression of the ostial circumflex dissection to the left main, the patient was transferred to [facility] where she was monitored for several more days. She was eventually discharged on dual antiplatelet therapy, statin and beta-blocker.      She since then has had no significant events.     Family history reviewed.   No h/o sudden death, aneurysms, stroke, MI. Parents are old age.  Twin daughters, healthy.      Had job stress leading to event. She is a .     No history of HTN. On metoprolol now off. Off plavix now. On aspirin.      I personally reviewed her prior imaging from the past few years. Aorta 4.0 cm on surveillance imaging. No fam h/o aortic disease.     On ROS: no lens dislocation, normal eye-width, normal uvula, normal palate, no hypermobility in thumb joints, no skin translucency, normal skin, no vaginal prolapse or hernias, no abnormal wound healing, no easy bruising or bleeding, no chest wall deformities, no dental crowding, normal stature.    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  A variant of unknown significance (VUS) means that there is a genetic change in which we do not have enough  information to determine if it is disease causing or not. Labs review published data, functional studies, presences in population databases, similarity to normal sequence, and computer prediction models.    The COL9A3 gene is known to be associated with multiple epiphyseal dysplasia (MED), which is associated with joint pain in childhood.  Although it is a collagen gene it is not known to be associated with SCAD. This particular variant has not been reported before. Computer models predict this variant will impact protein function. Based on the current information it is unclear if this VUS is associated with disease or not.          ASSESSMENT/PLAN:     SCAD: differential includes collagen vascular disorder and/or fibromuscular dysplasia, discussed CT scan results are not very sensitive and can only detect when FMD has progressed to the point of becoming visual to the naked eye by CT scan however genetic changes and alterations of blood flow can be occurring within the blood vessel wall itself (webs). Stress alone does not induce events, and usually a genetic predisposition is present. 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 Will continue aspirin for now. Not on beta blocker given lack of htn. No need for statin per pt preference.     2.  Aortic dilation (mild): will plan for q1-2 year surveillance, TAAD panel as above. Ascending aorta 4.0 cm.     3.  Possible FMD: will obtain renal artery ultrasound eery few years      Follow up in 2 years     Isabel Tee MD MSC     The longitudinal plan of care for the diagnosis(es)/condition(s) as documented were addressed during this visit. Due to the added complexity in care, I will continue to support Palak in the subsequent management and with ongoing continuity of care.          PAST MEDICAL HISTORY  Past Medical History:   Diagnosis Date    Coronary artery dissection 01/03/2023    Left circumflex    Unspecified sinusitis (chronic)        CURRENT MEDICATIONS  No current  outpatient medications on file.       PAST SURGICAL HISTORY:  Past Surgical History:   Procedure Laterality Date    COLONOSCOPY  8/11/2015    Dr. No [facility]    COLONOSCOPY N/A 8/11/2015    Procedure: COLONOSCOPY;  Surgeon: Jake No MD;  Location:  GI    CV CORONARY ANGIOGRAM N/A 1/3/2023    Procedure: Coronary Angiogram;  Surgeon: Chong Schmitz MD;  Location:  HEART CARDIAC CATH LAB    CV LEFT HEART CATH N/A 1/3/2023    Procedure: Left Heart Catheterization;  Surgeon: Chong Schmitz MD;  Location:  HEART CARDIAC CATH LAB    ZZC NONSPECIFIC PROCEDURE  2001    sinus op       ALLERGIES     Allergies   Allergen Reactions    No Known Drug Allergy        FAMILY HISTORY  Family History   Problem Relation Age of Onset    Cancer Father         kidney    Respiratory Father         emphysema    Diabetes Father     Colon Cancer No family hx of        SOCIAL HISTORY  Social History     Socioeconomic History    Marital status:      Spouse name: Bill    Number of children: 0    Years of education: 16    Highest education level: Not on file   Occupational History    Occupation: teacher   Tobacco Use    Smoking status: Never     Passive exposure: Never    Smokeless tobacco: Never   Vaping Use    Vaping status: Never Used   Substance and Sexual Activity    Alcohol use: No     Comment: maybe 1 time per year    Drug use: No    Sexual activity: Yes     Partners: Male   Other Topics Concern    Parent/sibling w/ CABG, MI or angioplasty before 65F 55M? Not Asked   Social History Narrative    Not on file     Social Determinants of Health     Financial Resource Strain: Low Risk  (1/31/2024)    Financial Resource Strain     Within the past 12 months, have you or your family members you live with been unable to get utilities (heat, electricity) when it was really needed?: No   Food Insecurity: Low Risk  (1/31/2024)    Food Insecurity     Within the past 12 months, did you worry that your food would run out  before you got money to buy more?: No     Within the past 12 months, did the food you bought just not last and you didn t have money to get more?: No   Transportation Needs: Low Risk  (1/31/2024)    Transportation Needs     Within the past 12 months, has lack of transportation kept you from medical appointments, getting your medicines, non-medical meetings or appointments, work, or from getting things that you need?: No   Physical Activity: Inactive (1/17/2022)    Exercise Vital Sign     Days of Exercise per Week: 0 days     Minutes of Exercise per Session: 0 min   Stress: No Stress Concern Present (1/17/2022)    [place] [employer] - Occupational Stress Questionnaire     Feeling of Stress : Only a little   Social Connections: Moderately Isolated (1/17/2022)    Social Connection and Isolation Panel [NHANES]     Frequency of Communication with Friends and Family: Once a week     Frequency of Social Gatherings with Friends and Family: Never     Attends [employer] Services: More than 4 times per year     Active Member of Clubs or Organizations: No     Attends Club or Organization Meetings: Not on file     Marital Status:    Interpersonal Safety: Low Risk  (1/31/2024)    Interpersonal Safety     Do you feel physically and emotionally safe where you currently live?: Yes     Within the past 12 months, have you been hit, slapped, kicked or otherwise physically hurt by someone?: No     Within the past 12 months, have you been humiliated or emotionally abused in other ways by your partner or ex-partner?: No   Housing Stability: Low Risk  (1/31/2024)    Housing Stability     Do you have housing? : Yes     Are you worried about losing your housing?: No       ROS:   Constitutional: No fever, chills, or sweats. No weight gain/loss   ENT: No visual disturbance, ear ache, epistaxis, sore throat  Allergies/Immunologic: Negative  Respiratory: No cough, hemoptysia  Cardiovascular: As per HPI  GI: No nausea,  "vomiting, hematemesis, melena, or hematochezia  : No urinary frequency, dysuria, or hematuria  Integument: Negative  Psychiatric: Negative  Neuro: Negative  Endocrinology: Negative   Musculoskeletal: Negative  Vascular: No walking impairment, claudication, ischemic rest pain or nonhealing wounds    EXAM:  BP (!) 146/78 (BP Location: Right arm, Patient Position: Sitting)   Pulse 80   Ht 1.626 m (5' 4\")   Wt 81.6 kg (180 lb)   LMP  (LMP Unknown)   SpO2 100%   BMI 30.90 kg/m    In general, the patient is a pleasant female in no apparent distress.    HEENT: NC/AT.  PERRLA.  EOMI.  Sclerae white, not injected.  Nares clear.  Pharynx without erythema or exudate.  Dentition intact.    Neck: No adenopathy.  No thyromegaly. Carotids +2/2 bilaterally without bruits.  No jugular venous distension.   Heart: RRR. Normal S1, S2 splits physiologically. No murmur, rub, click, or gallop. The PMI is in the 5th ICS in the midclavicular line. There is no heave.    Lungs: CTA.  No ronchi, wheezes, rales.  No dullness to percussion.   Abdomen: Soft, nontender, nondistended. No organomegaly. No AAA.  No bruits.   Extremities: No clubbing, cyanosis, or edema.  No wounds. No varicose veins signs of chronic venous insufficiency.   Vascular: No bruits are noted.    Labs:  LIPID RESULTS:  Lab Results   Component Value Date    CHOL 159 01/03/2023    CHOL 186 07/22/2019    HDL 44 (L) 01/03/2023    HDL 45 (L) 07/22/2019    LDL 89 01/03/2023     (H) 07/22/2019    TRIG 132 01/03/2023    TRIG 179 (H) 07/22/2019    CHOLHDLRATIO 3.8 07/21/2015    NHDL 115 01/03/2023    NHDL 141 (H) 07/22/2019       LIVER ENZYME RESULTS:  Lab Results   Component Value Date    AST 47 (H) 01/05/2023    AST 25 11/21/2006    ALT 25 01/05/2023    ALT 24 11/21/2006       CBC RESULTS:  Lab Results   Component Value Date    WBC 8.0 07/20/2024    WBC 9.5 11/21/2006    RBC 4.66 07/20/2024    RBC 4.59 11/21/2006    HGB 13.6 07/20/2024    HGB 13.9 11/21/2006    HCT " 41.8 07/20/2024    HCT 40.0 11/21/2006    MCV 90 07/20/2024    MCV 87 11/21/2006    MCH 29.2 07/20/2024    MCH 30.3 11/21/2006    MCHC 32.5 07/20/2024    MCHC 34.8 11/21/2006    RDW 13.8 07/20/2024    RDW 14.0 11/21/2006     07/20/2024     11/21/2006       BMP RESULTS:  Lab Results   Component Value Date     07/20/2024     11/21/2006    POTASSIUM 4.1 07/20/2024    POTASSIUM 4.0 01/07/2023    POTASSIUM 3.9 11/21/2006    CHLORIDE 103 07/20/2024    CHLORIDE 106 01/05/2023    CHLORIDE 102 11/21/2006    CO2 23 07/20/2024    CO2 26 01/05/2023    CO2 26 11/21/2006    ANIONGAP 13 07/20/2024    ANIONGAP 8 01/05/2023    ANIONGAP 12 11/21/2006    GLC 93 07/20/2024    GLC 99 01/05/2023    GLC 85 07/22/2019    BUN 14.4 07/20/2024    BUN 13 01/05/2023    BUN 12 11/21/2006    CR 0.59 07/20/2024    CR 0.80 11/21/2006    GFRESTIMATED >90 07/20/2024    GFRESTIMATED 84 11/21/2006    GFRESTBLACK  11/21/2006     >90  Stages of Chronic Kidney Disease  Stage 1:  GFR 90 or greater and other evidence of kidney damage*  Stage 2:  GFR 60-89 and other evidence of kidney damage *  Stage 3:  GFR 30-59  Stage 4:  GFR 15-29  Stage 5:  GFR less than 15 or dialysis  *Chronic kidney disease is defined as kidney damage or GFR less than 60   mL/min/1.73 m2 for three months or greater.  Kidney damage is defined as   pathologic abnormalities or markers or damage, including abnormalities in blood   or urine tests or imaging studies.    JARRET 9.2 07/20/2024    JARRET 9.1 11/21/2006        A1C RESULTS:  Lab Results   Component Value Date    A1C 5.8 (H) 01/03/2023

## 2024-11-27 ENCOUNTER — IMMUNIZATION (OUTPATIENT)
Dept: PEDIATRICS | Facility: CLINIC | Age: 59
End: 2024-11-27
Payer: COMMERCIAL

## 2024-11-27 DIAGNOSIS — Z23 ENCOUNTER FOR IMMUNIZATION: Primary | ICD-10-CM

## 2024-11-27 PROCEDURE — 99207 PR NO CHARGE NURSE ONLY: CPT

## 2024-11-27 PROCEDURE — 90673 RIV3 VACCINE NO PRESERV IM: CPT

## 2024-11-27 PROCEDURE — 90471 IMMUNIZATION ADMIN: CPT

## 2024-11-27 NOTE — PROGRESS NOTES
Prior to immunization administration, verified patients identity using patient s name and date of birth. Please see Immunization Activity for additional information.     Is the patient's temperature normal (100.5 or less)? Yes     Patient MEETS CRITERIA. PROCEED with vaccine administration.        11/27/2024   General Questionnaire    Do you have any questions for your care team about the vaccines you will be receiving today? no                11/27/2024   INFLUENZA   Would you like to receive the flu shot or the nasal flu vaccine today? Flu Shot   Have you had a serious reaction to a flu vaccine or something in a flu vaccine? No   Have you had Guillain-[place] syndrome within 6 weeks of getting a vaccine? No   Have you received a bone marrow transplant within the previous 6 months? No            Patient MEETS CRITERIA. PROCEED with vaccine administration.        Patient instructed to remain in clinic for 15 minutes afterwards, and to report any adverse reactions.      Link to Ancillary Visit Immunization Standing Orders SmartSet     Screening performed by Sheyla Jj CMA on 11/27/2024 at 2:34 PM.

## 2024-12-28 NOTE — TELEPHONE ENCOUNTER
[facility] Cardiology Refill Guideline reviewed.  Medication meets criteria for refill.  Deborah Pryor RN on 1/15/2024 at 2:12 PM       Yes

## 2025-01-02 ENCOUNTER — PATIENT OUTREACH (OUTPATIENT)
Dept: CARE COORDINATION | Facility: CLINIC | Age: 60
End: 2025-01-02
Payer: COMMERCIAL

## 2025-05-26 ENCOUNTER — PATIENT OUTREACH (OUTPATIENT)
Dept: CARE COORDINATION | Facility: CLINIC | Age: 60
End: 2025-05-26
Payer: COMMERCIAL

## 2025-07-09 ENCOUNTER — ANCILLARY PROCEDURE (OUTPATIENT)
Dept: MAMMOGRAPHY | Facility: CLINIC | Age: 60
End: 2025-07-09
Attending: STUDENT IN AN ORGANIZED HEALTH CARE EDUCATION/TRAINING PROGRAM
Payer: COMMERCIAL

## 2025-07-09 DIAGNOSIS — Z12.31 VISIT FOR SCREENING MAMMOGRAM: ICD-10-CM

## 2025-07-09 PROCEDURE — 77063 BREAST TOMOSYNTHESIS BI: CPT | Mod: TC | Performed by: RADIOLOGY

## 2025-07-09 PROCEDURE — 77067 SCR MAMMO BI INCL CAD: CPT | Mod: TC | Performed by: RADIOLOGY

## 2025-08-29 ENCOUNTER — ANCILLARY PROCEDURE (OUTPATIENT)
Dept: GENERAL RADIOLOGY | Facility: CLINIC | Age: 60
End: 2025-08-29
Attending: STUDENT IN AN ORGANIZED HEALTH CARE EDUCATION/TRAINING PROGRAM
Payer: COMMERCIAL

## 2025-08-29 DIAGNOSIS — M79.672 LEFT FOOT PAIN: ICD-10-CM

## (undated) DEVICE — CATH ANGIO INFINITI 3DRC 4FRX100CM 538476

## (undated) DEVICE — INTRO SHEATH 4FRX10CM PINNACLE RSS402

## (undated) DEVICE — KIT HAND CONTROL ANGIOTOUCH ACIST 65CM AT-P65

## (undated) DEVICE — CATH DIAG 4FR JL 4.5 538417

## (undated) DEVICE — DEFIB PRO-PADZ LVP LQD GEL ADULT 8900-2105-01

## (undated) DEVICE — GUIDEWIRE VASC 0.035INX150CM INQWIRE J TIP IQ35F150J3F/A

## (undated) DEVICE — MANIFOLD KIT ANGIO AUTOMATED 014613

## (undated) RX ORDER — IVABRADINE 5 MG/1
TABLET, FILM COATED ORAL
Status: DISPENSED
Start: 2024-09-11

## (undated) RX ORDER — NITROGLYCERIN 0.4 MG/1
TABLET SUBLINGUAL
Status: DISPENSED
Start: 2024-09-11

## (undated) RX ORDER — METOPROLOL TARTRATE 50 MG
TABLET ORAL
Status: DISPENSED
Start: 2024-09-11

## (undated) RX ORDER — METOPROLOL TARTRATE 1 MG/ML
INJECTION, SOLUTION INTRAVENOUS
Status: DISPENSED
Start: 2024-09-11